# Patient Record
Sex: FEMALE | Race: WHITE
[De-identification: names, ages, dates, MRNs, and addresses within clinical notes are randomized per-mention and may not be internally consistent; named-entity substitution may affect disease eponyms.]

---

## 2019-07-02 ENCOUNTER — HOSPITAL ENCOUNTER (OUTPATIENT)
Dept: HOSPITAL 5 - TRG | Age: 39
Discharge: HOME | End: 2019-07-02
Attending: OBSTETRICS & GYNECOLOGY
Payer: COMMERCIAL

## 2019-07-02 VITALS — SYSTOLIC BLOOD PRESSURE: 120 MMHG | DIASTOLIC BLOOD PRESSURE: 76 MMHG

## 2019-07-02 DIAGNOSIS — O47.03: Primary | ICD-10-CM

## 2019-07-02 DIAGNOSIS — Z3A.37: ICD-10-CM

## 2019-07-04 ENCOUNTER — HOSPITAL ENCOUNTER (INPATIENT)
Dept: HOSPITAL 5 - TRG | Age: 39
LOS: 9 days | Discharge: HOME | End: 2019-07-13
Attending: OBSTETRICS & GYNECOLOGY | Admitting: OBSTETRICS & GYNECOLOGY
Payer: COMMERCIAL

## 2019-07-04 DIAGNOSIS — E87.0: ICD-10-CM

## 2019-07-04 DIAGNOSIS — E87.6: ICD-10-CM

## 2019-07-04 DIAGNOSIS — Z23: ICD-10-CM

## 2019-07-04 DIAGNOSIS — Z3A.38: ICD-10-CM

## 2019-07-04 DIAGNOSIS — A41.9: ICD-10-CM

## 2019-07-04 DIAGNOSIS — J96.02: ICD-10-CM

## 2019-07-04 DIAGNOSIS — Z67.41: ICD-10-CM

## 2019-07-04 DIAGNOSIS — J96.01: ICD-10-CM

## 2019-07-04 DIAGNOSIS — Z72.89: ICD-10-CM

## 2019-07-04 DIAGNOSIS — K21.9: ICD-10-CM

## 2019-07-04 DIAGNOSIS — N17.0: ICD-10-CM

## 2019-07-04 DIAGNOSIS — K56.7: ICD-10-CM

## 2019-07-04 DIAGNOSIS — F41.9: ICD-10-CM

## 2019-07-04 DIAGNOSIS — O26.893: ICD-10-CM

## 2019-07-04 DIAGNOSIS — R65.21: ICD-10-CM

## 2019-07-04 DIAGNOSIS — O99.89: ICD-10-CM

## 2019-07-04 DIAGNOSIS — R00.1: ICD-10-CM

## 2019-07-04 LAB
HCT VFR BLD CALC: 38.9 % (ref 30.3–42.9)
HGB BLD-MCNC: 13.2 GM/DL (ref 10.1–14.3)
MCHC RBC AUTO-ENTMCNC: 34 % (ref 30–34)
MCV RBC AUTO: 93 FL (ref 79–97)
PLATELET # BLD: 215 K/MM3 (ref 140–440)
RBC # BLD AUTO: 4.21 M/MM3 (ref 3.65–5.03)

## 2019-07-04 PROCEDURE — 84450 TRANSFERASE (AST) (SGOT): CPT

## 2019-07-04 PROCEDURE — 94640 AIRWAY INHALATION TREATMENT: CPT

## 2019-07-04 PROCEDURE — 76819 FETAL BIOPHYS PROFIL W/O NST: CPT

## 2019-07-04 PROCEDURE — 82565 ASSAY OF CREATININE: CPT

## 2019-07-04 PROCEDURE — 93970 EXTREMITY STUDY: CPT

## 2019-07-04 PROCEDURE — 84460 ALANINE AMINO (ALT) (SGPT): CPT

## 2019-07-04 PROCEDURE — 76815 OB US LIMITED FETUS(S): CPT

## 2019-07-04 PROCEDURE — 85384 FIBRINOGEN ACTIVITY: CPT

## 2019-07-04 PROCEDURE — 36415 COLL VENOUS BLD VENIPUNCTURE: CPT

## 2019-07-04 PROCEDURE — 85007 BL SMEAR W/DIFF WBC COUNT: CPT

## 2019-07-04 PROCEDURE — 80061 LIPID PANEL: CPT

## 2019-07-04 PROCEDURE — 85018 HEMOGLOBIN: CPT

## 2019-07-04 PROCEDURE — C1765 ADHESION BARRIER: HCPCS

## 2019-07-04 PROCEDURE — 82570 ASSAY OF URINE CREATININE: CPT

## 2019-07-04 PROCEDURE — 85610 PROTHROMBIN TIME: CPT

## 2019-07-04 PROCEDURE — 81001 URINALYSIS AUTO W/SCOPE: CPT

## 2019-07-04 PROCEDURE — 87205 SMEAR GRAM STAIN: CPT

## 2019-07-04 PROCEDURE — 36600 WITHDRAWAL OF ARTERIAL BLOOD: CPT

## 2019-07-04 PROCEDURE — 87086 URINE CULTURE/COLONY COUNT: CPT

## 2019-07-04 PROCEDURE — 85730 THROMBOPLASTIN TIME PARTIAL: CPT

## 2019-07-04 PROCEDURE — 74176 CT ABD & PELVIS W/O CONTRAST: CPT

## 2019-07-04 PROCEDURE — 84550 ASSAY OF BLOOD/URIC ACID: CPT

## 2019-07-04 PROCEDURE — 84300 ASSAY OF URINE SODIUM: CPT

## 2019-07-04 PROCEDURE — 94003 VENT MGMT INPAT SUBQ DAY: CPT

## 2019-07-04 PROCEDURE — 83735 ASSAY OF MAGNESIUM: CPT

## 2019-07-04 PROCEDURE — 84132 ASSAY OF SERUM POTASSIUM: CPT

## 2019-07-04 PROCEDURE — 76770 US EXAM ABDO BACK WALL COMP: CPT

## 2019-07-04 PROCEDURE — 83880 ASSAY OF NATRIURETIC PEPTIDE: CPT

## 2019-07-04 PROCEDURE — 80048 BASIC METABOLIC PNL TOTAL CA: CPT

## 2019-07-04 PROCEDURE — 84100 ASSAY OF PHOSPHORUS: CPT

## 2019-07-04 PROCEDURE — 85461 HEMOGLOBIN FETAL: CPT

## 2019-07-04 PROCEDURE — 93306 TTE W/DOPPLER COMPLETE: CPT

## 2019-07-04 PROCEDURE — C9113 INJ PANTOPRAZOLE SODIUM, VIA: HCPCS

## 2019-07-04 PROCEDURE — G0378 HOSPITAL OBSERVATION PER HR: HCPCS

## 2019-07-04 PROCEDURE — 93005 ELECTROCARDIOGRAM TRACING: CPT

## 2019-07-04 PROCEDURE — 84478 ASSAY OF TRIGLYCERIDES: CPT

## 2019-07-04 PROCEDURE — 86850 RBC ANTIBODY SCREEN: CPT

## 2019-07-04 PROCEDURE — 83930 ASSAY OF BLOOD OSMOLALITY: CPT

## 2019-07-04 PROCEDURE — 94002 VENT MGMT INPAT INIT DAY: CPT

## 2019-07-04 PROCEDURE — 71045 X-RAY EXAM CHEST 1 VIEW: CPT

## 2019-07-04 PROCEDURE — 88307 TISSUE EXAM BY PATHOLOGIST: CPT

## 2019-07-04 PROCEDURE — 82550 ASSAY OF CK (CPK): CPT

## 2019-07-04 PROCEDURE — 84484 ASSAY OF TROPONIN QUANT: CPT

## 2019-07-04 PROCEDURE — 82803 BLOOD GASES ANY COMBINATION: CPT

## 2019-07-04 PROCEDURE — 90715 TDAP VACCINE 7 YRS/> IM: CPT

## 2019-07-04 PROCEDURE — 82553 CREATINE MB FRACTION: CPT

## 2019-07-04 PROCEDURE — 83690 ASSAY OF LIPASE: CPT

## 2019-07-04 PROCEDURE — 94760 N-INVAS EAR/PLS OXIMETRY 1: CPT

## 2019-07-04 PROCEDURE — 74018 RADEX ABDOMEN 1 VIEW: CPT

## 2019-07-04 PROCEDURE — 93010 ELECTROCARDIOGRAM REPORT: CPT

## 2019-07-04 PROCEDURE — 80076 HEPATIC FUNCTION PANEL: CPT

## 2019-07-04 PROCEDURE — 85379 FIBRIN DEGRADATION QUANT: CPT

## 2019-07-04 PROCEDURE — 86901 BLOOD TYPING SEROLOGIC RH(D): CPT

## 2019-07-04 PROCEDURE — 83615 LACTATE (LD) (LDH) ENZYME: CPT

## 2019-07-04 PROCEDURE — 83935 ASSAY OF URINE OSMOLALITY: CPT

## 2019-07-04 PROCEDURE — 85014 HEMATOCRIT: CPT

## 2019-07-04 PROCEDURE — 80053 COMPREHEN METABOLIC PANEL: CPT

## 2019-07-04 PROCEDURE — 86140 C-REACTIVE PROTEIN: CPT

## 2019-07-04 PROCEDURE — 86900 BLOOD TYPING SEROLOGIC ABO: CPT

## 2019-07-04 PROCEDURE — 85025 COMPLETE CBC W/AUTO DIFF WBC: CPT

## 2019-07-04 PROCEDURE — 86592 SYPHILIS TEST NON-TREP QUAL: CPT

## 2019-07-04 PROCEDURE — 82962 GLUCOSE BLOOD TEST: CPT

## 2019-07-04 PROCEDURE — 85027 COMPLETE CBC AUTOMATED: CPT

## 2019-07-04 PROCEDURE — 82140 ASSAY OF AMMONIA: CPT

## 2019-07-04 PROCEDURE — 87040 BLOOD CULTURE FOR BACTERIA: CPT

## 2019-07-04 PROCEDURE — 87070 CULTURE OTHR SPECIMN AEROBIC: CPT

## 2019-07-04 RX ADMIN — SODIUM CHLORIDE, SODIUM LACTATE, POTASSIUM CHLORIDE, AND CALCIUM CHLORIDE SCH MLS/HR: .6; .31; .03; .02 INJECTION, SOLUTION INTRAVENOUS at 22:30

## 2019-07-04 RX ADMIN — AMPICILLIN SODIUM SCH MLS/HR: 1 INJECTION, POWDER, FOR SOLUTION INTRAMUSCULAR; INTRAVENOUS at 23:28

## 2019-07-04 RX ADMIN — FENTANYL CITRATE PRN MCG: 50 INJECTION, SOLUTION INTRAMUSCULAR; INTRAVENOUS at 20:54

## 2019-07-04 RX ADMIN — FENTANYL CITRATE PRN MCG: 50 INJECTION, SOLUTION INTRAMUSCULAR; INTRAVENOUS at 22:26

## 2019-07-04 NOTE — ULTRASOUND REPORT
Limited OB ultrasound and BPP



INDICATION:  labor



FINDINGS: Single intrauterine pregnancy is identified. The fetus is in a cephalic presentation. The a
mniotic fluid index is 5 cm which is below normal. The placenta is located anteriorly. Placenta is gr
aldair 1. Fetal heart rate is 142 bpm.











Biophysical profile score is 8 out of 8.



There is one pocket of fluid measuring 2 cm in the vertical axis even though the ANGUS is decreased.





IMPRESSION: The amniotic fluid index is decreased (5 cm). The BPP score is 8 out of 8.



Signer Name: Ethan Yin MD 

Signed: 2019 7:41 PM

 Workstation Name: [id number]

## 2019-07-04 NOTE — HISTORY AND PHYSICAL REPORT
History of Present Illness


Date of examination: 19


Date of admission: 


19


Chief complaint: 





contractions


History of present illness: 





Pt presents c/o contractions. Cx noted to be 1cm. Pt had variable decel and bpp 

with angus was obtained. ANGUS was 8/8 but ANGUS was 5cm. Pt admitted for delivery for

low ANGUS and latent labor.


EDC Confirmation:  2019


Gestational Age:  7 6/7 weeks





Past Pregnancy History 


   :      1


   Term Births:      0


   Premature Births:   0


   Living Children:   0


   Para:         0


   Mult. Births:      0


   Prev :   0


   Prev.  attempt?   0


   Aborta:      0


   Elect. Ab:      0


   Spont. Ab:      0


   Ectopics:      0








Past Medical History:


   Anxiety


   G [facility]





Past Surgical History:


   Reviewed history from 2016 and no changes required:


      Negative Past Surgical History








Social History:


   Reviewed history from 2016 and no changes required:


      Homemaker


      


      Smoking History:


      Patient has never smoked.








Risk Factors: 





Smoked Tobacco Use:  Never smoker


Smokeless Tobacco Use:  Never


Passive smoke exposure:  no


Drug use:  no


HIV high-risk behavior:  no


Alcohol use:  yes


   Type:  occ


   Drinks per day:  social


Exercise:  no


Seatbelt use:  100 %








Past Medical History 


Abnormal PAP: positive


Uterine Anomaly: negative





Social Hx: Homemaker





Smoking History:


Patient has never smoked.








Infection History 


Hx of STD: none


HIV Risk Eval: no


Personal hx. of genital herpes: yes


Partner hx. of genital herpes: no





Genetic History 


ADVANCED MATERNAL AGE


Congenital Heart Defect:


    Dad: unknown


Canavan Disease:


    Dad: unknown


Thalassemia


    Dad: unknown


Neural Tube Defect


    Dad: unknown


Down's Syndrome


    Dad: unknown


Randolph-Sachs


    Dad: unknown


Sickle Cell Disease/Trait


    Dad: unknown


Hemophilia


    Dad: unknown


Muscular Dystrophy


    Dad: unknown


Cystic Fibrosis


    Dad: unknown


Christiano Chorea


    Dad: unknown


Mental Retardation


    Dad: unknown


Fragile X


    Dad: unknown


Other Genetic/Chromosomal Disorder


    Dad: unknown


Child w/other birth defect


    Dad: unknown





Enviromental Exposures 


Xray Exposure: no


Medication, drug, or alcohol use since LMP: no


Chemical/Other Exposure: no


Exposure to Cat Liter: no


Hx of Parvovirus (Fifth Disease): no


Active Medications (reviewed today):


PRENATAL 19 ORAL TABLET CHEWABLE (PRENATAL VIT-FE FUMARATE-FA) 1 po q day as 

directed


VALTREX 500 MG ORAL TABLET (VALACYCLOVIR HCL) one by mouth twice a day for 3 

days


LEXAPRO 5 MG () 


VALTREX 500 MG ORAL TABLET (VALACYCLOVIR HCL) one by mouth twice a day for 3 

days





Current Allergies (reviewed today):


No known allergies





Past History





- Obstetrical History


Expected Date of Delivery: 19


Actual Gestation: 38 Week(s) 2 Day(s) 


: 1





Medications and Allergies


                                    Allergies











Allergy/AdvReac Type Severity Reaction Status Date / Time


 


No Known Allergies Allergy   Verified 19 19:31














Review of Systems


All systems: negative





- Vital Signs


Vital signs: 


                                   Vital Signs











Pulse BP


 


 85   177/85 


 


 19 18:02  19 18:02








                                        











Temp Pulse Resp BP Pulse Ox


 


 98.1 F   96 H  14   154/89    


 


 19 18:24  19 19:07  19 18:24  19 19:07   














- Physical Exam


Lungs: Positive: Clear to auscultation, Normal air movement


Abdomen: Positive: normal appearance, soft.  Negative: distention, tenderness, 

guarding


Genitourinary (Female): Positive: normal external genitalia, normal perenium.  

Negative: perineal/vulvar lesions


Vagina: Positive: other (no s/sx of SROM as per triage nurse. CX exam as per 

triage nurse)





Results


Result Diagrams: 


                                 19 19:45





All other labs normal.








Assessment and Plan





- Patient Problems


(1) 38 weeks gestation of pregnancy


Current Visit: Yes   Status: Acute   





(2) Prolonged latent phase of labor


Current Visit: Yes   Status: Acute   





(3) Oligohydramnios antepartum


Current Visit: Yes   Status: Acute   


Plan to address problem: 


-admit


-pitocin as needed currently having regular contractions








(4) Positive GBS test


Current Visit: Yes   Status: Acute   


Plan to address problem: 


-start antibx until delivery

## 2019-07-05 LAB
ALT SERPL-CCNC: 24 UNITS/L (ref 7–56)
BILIRUB UR QL STRIP: (no result)
BLOOD UR QL VISUAL: (no result)
HCT VFR BLD CALC: 39.4 % (ref 30.3–42.9)
HGB BLD-MCNC: 13.5 GM/DL (ref 10.1–14.3)
MCHC RBC AUTO-ENTMCNC: 34 % (ref 30–34)
MCV RBC AUTO: 91 FL (ref 79–97)
PH UR STRIP: 6 [PH] (ref 5–7)
PLATELET # BLD: 195 K/MM3 (ref 140–440)
PROT UR STRIP-MCNC: (no result) MG/DL
RBC # BLD AUTO: 4.31 M/MM3 (ref 3.65–5.03)
RBC #/AREA URNS HPF: 2 /HPF (ref 0–6)
URATE SERPL-MCNC: 4.7 MG/DL (ref 3.5–7.6)
UROBILINOGEN UR-MCNC: < 2 MG/DL (ref ?–2)
WBC #/AREA URNS HPF: < 1 /HPF (ref 0–6)

## 2019-07-05 PROCEDURE — 10H07YZ INSERTION OF OTHER DEVICE INTO PRODUCTS OF CONCEPTION, VIA NATURAL OR ARTIFICIAL OPENING: ICD-10-PCS | Performed by: OBSTETRICS & GYNECOLOGY

## 2019-07-05 RX ADMIN — FENTANYL CITRATE PRN MCG: 50 INJECTION, SOLUTION INTRAMUSCULAR; INTRAVENOUS at 01:40

## 2019-07-05 RX ADMIN — SODIUM CHLORIDE, SODIUM LACTATE, POTASSIUM CHLORIDE, AND CALCIUM CHLORIDE SCH MLS/HR: .6; .31; .03; .02 INJECTION, SOLUTION INTRAVENOUS at 13:55

## 2019-07-05 RX ADMIN — FENTANYL CITRATE PRN MCG: 50 INJECTION, SOLUTION INTRAMUSCULAR; INTRAVENOUS at 11:38

## 2019-07-05 RX ADMIN — AMPICILLIN SODIUM SCH MLS/HR: 1 INJECTION, POWDER, FOR SOLUTION INTRAMUSCULAR; INTRAVENOUS at 09:39

## 2019-07-05 RX ADMIN — AMPICILLIN SODIUM SCH MLS/HR: 1 INJECTION, POWDER, FOR SOLUTION INTRAMUSCULAR; INTRAVENOUS at 04:00

## 2019-07-05 RX ADMIN — FENTANYL CITRATE PRN MCG: 50 INJECTION, SOLUTION INTRAMUSCULAR; INTRAVENOUS at 14:28

## 2019-07-05 RX ADMIN — FENTANYL CITRATE PRN MCG: 50 INJECTION, SOLUTION INTRAMUSCULAR; INTRAVENOUS at 19:10

## 2019-07-05 RX ADMIN — AMPICILLIN SODIUM SCH MLS/HR: 1 INJECTION, POWDER, FOR SOLUTION INTRAMUSCULAR; INTRAVENOUS at 14:59

## 2019-07-05 RX ADMIN — AMPICILLIN SODIUM SCH MLS/HR: 1 INJECTION, POWDER, FOR SOLUTION INTRAMUSCULAR; INTRAVENOUS at 18:31

## 2019-07-05 NOTE — PROGRESS NOTE
Assessment and Plan


SVE 5,100,-1 Bolus for epidural Re-eval after placement








Subjective





- Subjective


Date of service: 07/05/19 (pt asking for epidural)


Principal diagnosis: 1) 38 wks  2)Oligo


Patient reports: fetal movement normal, no new complaints





Objective





- Vital Signs


Vital Signs: 


                               Vital Signs - 12hr











  07/05/19 07/05/19 07/05/19





  01:58 02:00 02:30


 


Temperature  97.6 F 97.8 F


 


Pulse Rate 92 H  


 


Respiratory  20 20





Rate   


 


Blood Pressure 136/75  














  07/05/19 07/05/19 07/05/19





  03:24 04:00 08:18


 


Temperature  98.0 F 


 


Pulse Rate 101 H  107 H


 


Respiratory  20 





Rate   


 


Blood Pressure 162/89  122/69














  07/05/19 07/05/19 07/05/19





  10:12 10:41 11:11


 


Temperature   


 


Pulse Rate 104 H 100 H 107 H


 


Respiratory   





Rate   


 


Blood Pressure 131/79 139/70 144/82














  07/05/19 07/05/19 07/05/19





  11:41 11:48 11:53


 


Temperature   


 


Pulse Rate 93 H 90 90


 


Respiratory   





Rate   


 


Blood Pressure 138/77 127/69 142/69














  07/05/19 07/05/19 07/05/19





  11:58 12:01 12:07


 


Temperature   


 


Pulse Rate 90 90 90


 


Respiratory   





Rate   


 


Blood Pressure 139/69 139/72 139/69














  07/05/19 07/05/19 07/05/19





  12:11 12:16 12:36


 


Temperature   


 


Pulse Rate 94 H 87 94 H


 


Respiratory   





Rate   


 


Blood Pressure 126/64 129/65 143/76














  07/05/19 07/05/19 07/05/19





  12:51 13:06 13:22


 


Temperature   


 


Pulse Rate 105 H 109 H 103 H


 


Respiratory   





Rate   


 


Blood Pressure 137/70 135/77 155/84














- Exam


Breasts: deferred


Cardiovascular: Regular rate


Lungs: Normal air movement, Other


Abdomen: Present: normal appearance, soft.  Absent: distention, tenderness


Uterus: Present: normal


FHR: auscultation normal, category 1


Uterine Contraction Monitor Mode: Internal


Cervical Dilatation: 5 (ISE/IUP)


Cervical Effacement Percentage: 100


Fetal station: -1


Uterine Contraction Pattern: Regular


Uterine Tone Measurement Phase: Resting


Uterine Contraction Intensity: Moderate


Extremities: normal


Deep Tendon Reflex Grade: Normal +2





- Labs


Labs: 


                                  Abnormal Labs











  07/04/19





  19:45


 


WBC  12.3 H


 


RDW  16.9 H








                         Laboratory Results - last 24 hr











  07/04/19 07/04/19 07/04/19





  19:45 19:45 21:00


 


WBC  12.3 H  


 


RBC  4.21  


 


Hgb  13.2  


 


Hct  38.9  


 


MCV  93  


 


MCH  31  


 


MCHC  34  


 


RDW  16.9 H  


 


Plt Count  215  


 


RPR   Nonreactive 


 


Blood Type    O NEGATIVE


 


Antibody Screen    Negative

## 2019-07-05 NOTE — EVENT NOTE
Date: 07/05/19





Assessment and Plan





- Patient Problems


(1) 38 weeks gestation of pregnancy


Current Visit: Yes   Status: Acute   





(2) Pulmonary edema


Current Visit: Yes   Status: Acute   





(3) Positive GBS test


Current Visit: Yes   Status: Acute   





(4) Gestational hypertension


Current Visit: Yes   Status: Acute   


Qualifiers: 


   Trimester: third trimester   Qualified Code(s): O13.3 - Gestational 

[pregnancy-induced] hypertension without significant proteinuria, third 

trimester   


Plan to address problem: 


with severe features, UA resulted, negative protein. Still with hold MgSO4 d/t 

pulm edema

## 2019-07-05 NOTE — PROGRESS NOTE
Assessment and Plan


 anesthesia presented to pt's room to place epidural and pt was c/o being 

SOB, gasping. O2 via face mask, O2 sat 70%. Stat x-ray ordered, BP elevated PIH 

labs ordered. SVE unchged. Notified  of situation Order given for 

Lasix 10mg X 1 dose.








Subjective





- Subjective


Date of service: 07/05/19 (called urgently to room)


Principal diagnosis: 1) 38 wks  2)Oligo


Patient reports: fetal movement normal, other (labored breathing when anesthesia

presented to the room), no new complaints





Objective





- Vital Signs


Vital Signs: 


                               Vital Signs - 12hr











  07/05/19 07/05/19 07/05/19





  08:18 10:12 10:41


 


Pulse Rate 107 H 104 H 100 H


 


Blood Pressure 122/69 131/79 139/70


 


O2 Sat by Pulse   





Oximetry   














  07/05/19 07/05/19 07/05/19





  11:11 11:41 11:48


 


Pulse Rate 107 H 93 H 90


 


Blood Pressure 144/82 138/77 127/69


 


O2 Sat by Pulse   





Oximetry   














  07/05/19 07/05/19 07/05/19





  11:53 11:58 12:01


 


Pulse Rate 90 90 90


 


Blood Pressure 142/69 139/69 139/72


 


O2 Sat by Pulse   





Oximetry   














  07/05/19 07/05/19 07/05/19





  12:07 12:11 12:16


 


Pulse Rate 90 94 H 87


 


Blood Pressure 139/69 126/64 129/65


 


O2 Sat by Pulse   





Oximetry   














  07/05/19 07/05/19 07/05/19





  12:36 12:51 13:06


 


Pulse Rate 94 H 105 H 109 H


 


Blood Pressure 143/76 137/70 135/77


 


O2 Sat by Pulse   





Oximetry   














  07/05/19 07/05/19 07/05/19





  13:22 14:13 14:18


 


Pulse Rate 103 H 110 H 108 H


 


Blood Pressure 155/84  


 


O2 Sat by Pulse  93 91





Oximetry   














  07/05/19 07/05/19 07/05/19





  14:20 14:22 14:23


 


Pulse Rate 116 H 113 H 114 H


 


Blood Pressure  169/93 


 


O2 Sat by Pulse 94  92





Oximetry   














  07/05/19 07/05/19 07/05/19





  14:28 14:33 14:36


 


Pulse Rate 117 H 118 H 109 H


 


Blood Pressure   139/62


 


O2 Sat by Pulse 93 76 L 





Oximetry   














  07/05/19 07/05/19 07/05/19





  14:38 14:43 14:48


 


Pulse Rate 114 H 113 H 118 H


 


Blood Pressure   


 


O2 Sat by Pulse 88 87 89





Oximetry   














  07/05/19 07/05/19 07/05/19





  14:53 14:58 15:03


 


Pulse Rate 113 H 121 H 122 H


 


Blood Pressure   


 


O2 Sat by Pulse 86 73 L 76 L





Oximetry   














  07/05/19 07/05/19 07/05/19





  15:07 15:08 15:13


 


Pulse Rate 116 H 121 H 124 H


 


Blood Pressure 172/111  


 


O2 Sat by Pulse  76 L 79 L





Oximetry   














  07/05/19 07/05/19 07/05/19





  15:39 15:40 15:42


 


Pulse Rate  136 H 125 H


 


Blood Pressure   221/96


 


O2 Sat by Pulse 72 L 70 L 





Oximetry   














  07/05/19 07/05/19 07/05/19





  15:45 15:48 15:50


 


Pulse Rate 132 H 133 H 131 H


 


Blood Pressure 265/148 137/90 


 


O2 Sat by Pulse 88  90





Oximetry   














  07/05/19 07/05/19 07/05/19





  15:52 15:55 16:00


 


Pulse Rate 139 H 131 H 141 H


 


Blood Pressure 143/81  


 


O2 Sat by Pulse  94 86





Oximetry   














  07/05/19 07/05/19 07/05/19





  16:03 16:05 16:10


 


Pulse Rate 138 H 138 H 137 H


 


Blood Pressure   


 


O2 Sat by Pulse 90 91 90





Oximetry   














  07/05/19 07/05/19 07/05/19





  16:11 16:15 16:20


 


Pulse Rate 45 L 140 H 


 


Blood Pressure   


 


O2 Sat by Pulse 94 93 92





Oximetry   














  07/05/19 07/05/19 07/05/19





  16:21 16:23 16:25


 


Pulse Rate 144 H 141 H 130 H


 


Blood Pressure  185/99 186/90


 


O2 Sat by Pulse 93  





Oximetry   














  07/05/19 07/05/19





  16:26 16:27


 


Pulse Rate 134 H 136 H


 


Blood Pressure  160/66


 


O2 Sat by Pulse 92 





Oximetry  














- Exam


Breasts: deferred


Cardiovascular: Other (tachycardia >100)


Lungs: Other (wheezing and rales)


Abdomen: Present: normal appearance, soft.  Absent: distention, tenderness


Uterus: Present: normal


FHR: auscultation normal, category 1


Uterine Contraction Monitor Mode: External


Cervical Dilatation: 4


Cervical Effacement Percentage: 100


Fetal station: -1


Uterine Contraction Pattern: Regular


Uterine Tone Measurement Phase: Resting


Uterine Contraction Intensity: Moderate


Extremities: edema


Deep Tendon Reflex Grade: Normal +2





- Labs


Labs: 


                                  Abnormal Labs











  07/04/19 07/05/19





  19:45 16:12


 


WBC  12.3 H  16.3 H


 


RDW  16.9 H  16.6 H








                         Laboratory Results - last 24 hr











  07/04/19 07/04/19 07/04/19





  19:45 19:45 21:00


 


WBC  12.3 H  


 


RBC  4.21  


 


Hgb  13.2  


 


Hct  38.9  


 


MCV  93  


 


MCH  31  


 


MCHC  34  


 


RDW  16.9 H  


 


Plt Count  215  


 


RPR   Nonreactive 


 


Blood Type    O NEGATIVE


 


Antibody Screen    Negative














  07/05/19





  16:12


 


WBC  16.3 H


 


RBC  4.31


 


Hgb  13.5


 


Hct  39.4


 


MCV  91


 


MCH  31


 


MCHC  34


 


RDW  16.6 H


 


Plt Count  195


 


RPR 


 


Blood Type 


 


Antibody Screen

## 2019-07-05 NOTE — PROGRESS NOTE
Assessment and Plan





- Patient Problems


(1) 38 weeks gestation of pregnancy


Current Visit: Yes   Status: Acute   


Plan to address problem: 


No cervical change since ~1330, pitocin only increased to 4mu/min with variables

noted at 4mu/min. Pitocin decreaseed to 2mu/min however discontinued when she 

was diagnosed with Pulm edema. Options reviewed. She desires to proceed with c/s

however will attempt to optimize breathing prior to c/s. Cat 1 FHT's








(2) Pulmonary edema


Current Visit: Yes   Status: Acute   


Plan to address problem: 


Feeling better, s/p lasix 10mg IV x2








(3) Preeclampsia


Current Visit: Yes   Status: Acute   


Plan to address problem: 


Diagnosis by elevated BP's, will hold MgSO4 d/t pulmonary edema. observe closely








(4) Positive GBS test


Current Visit: Yes   Status: Acute   





Subjective





- Subjective


Date of service: 07/05/19


Principal diagnosis: 1) 38 wks  2)Oligo 3) Preeclampsia 4) pulmonary edema


Interval history: 


Breathing better, resting in bed head elevated. 10L face mask





Patient reports: new complaints, fetal movement normal, other (labored breathing

when anesthesia presented to the room)





Objective





- Vital Signs


Vital Signs: 


                               Vital Signs - 12hr











  07/05/19 07/05/19 07/05/19





  08:18 10:12 10:41


 


Pulse Rate 107 H 104 H 100 H


 


Blood Pressure 122/69 131/79 139/70


 


O2 Sat by Pulse   





Oximetry   














  07/05/19 07/05/19 07/05/19





  11:11 11:41 11:48


 


Pulse Rate 107 H 93 H 90


 


Blood Pressure 144/82 138/77 127/69


 


O2 Sat by Pulse   





Oximetry   














  07/05/19 07/05/19 07/05/19





  11:53 11:58 12:01


 


Pulse Rate 90 90 90


 


Blood Pressure 142/69 139/69 139/72


 


O2 Sat by Pulse   





Oximetry   














  07/05/19 07/05/19 07/05/19





  12:07 12:11 12:16


 


Pulse Rate 90 94 H 87


 


Blood Pressure 139/69 126/64 129/65


 


O2 Sat by Pulse   





Oximetry   














  07/05/19 07/05/19 07/05/19





  12:36 12:51 13:06


 


Pulse Rate 94 H 105 H 109 H


 


Blood Pressure 143/76 137/70 135/77


 


O2 Sat by Pulse   





Oximetry   














  07/05/19 07/05/19 07/05/19





  13:22 14:13 14:18


 


Pulse Rate 103 H 110 H 108 H


 


Blood Pressure 155/84  


 


O2 Sat by Pulse  93 91





Oximetry   














  07/05/19 07/05/19 07/05/19





  14:20 14:22 14:23


 


Pulse Rate 116 H 113 H 114 H


 


Blood Pressure  169/93 


 


O2 Sat by Pulse 94  92





Oximetry   














  07/05/19 07/05/19 07/05/19





  14:28 14:33 14:36


 


Pulse Rate 117 H 118 H 109 H


 


Blood Pressure   139/62


 


O2 Sat by Pulse 93 76 L 





Oximetry   














  07/05/19 07/05/19 07/05/19





  14:38 14:43 14:48


 


Pulse Rate 114 H 113 H 118 H


 


Blood Pressure   


 


O2 Sat by Pulse 88 87 89





Oximetry   














  07/05/19 07/05/19 07/05/19





  14:53 14:58 15:03


 


Pulse Rate 113 H 121 H 122 H


 


Blood Pressure   


 


O2 Sat by Pulse 86 73 L 76 L





Oximetry   














  07/05/19 07/05/19 07/05/19





  15:07 15:08 15:13


 


Pulse Rate 116 H 121 H 124 H


 


Blood Pressure 172/111  


 


O2 Sat by Pulse  76 L 79 L





Oximetry   














  07/05/19 07/05/19 07/05/19





  15:39 15:40 15:42


 


Pulse Rate  136 H 125 H


 


Blood Pressure   221/96


 


O2 Sat by Pulse 72 L 70 L 





Oximetry   














  07/05/19 07/05/19 07/05/19





  15:45 15:48 15:50


 


Pulse Rate 132 H 133 H 131 H


 


Blood Pressure 265/148 137/90 


 


O2 Sat by Pulse 88  90





Oximetry   














  07/05/19 07/05/19 07/05/19





  15:52 15:55 16:00


 


Pulse Rate 139 H 131 H 141 H


 


Blood Pressure 143/81  


 


O2 Sat by Pulse  94 86





Oximetry   














  07/05/19 07/05/19 07/05/19





  16:03 16:05 16:10


 


Pulse Rate 138 H 138 H 137 H


 


Blood Pressure   


 


O2 Sat by Pulse 90 91 90





Oximetry   














  07/05/19 07/05/19 07/05/19





  16:11 16:15 16:20


 


Pulse Rate 45 L 140 H 


 


Blood Pressure   


 


O2 Sat by Pulse 94 93 92





Oximetry   














  07/05/19 07/05/19 07/05/19





  16:21 16:23 16:25


 


Pulse Rate 144 H 141 H 130 H


 


Blood Pressure  185/99 186/90


 


O2 Sat by Pulse 93  





Oximetry   














  07/05/19 07/05/19 07/05/19





  16:26 16:27 16:29


 


Pulse Rate 134 H 136 H 136 H


 


Blood Pressure  160/66 150/66


 


O2 Sat by Pulse 92  





Oximetry   














  07/05/19 07/05/19 07/05/19





  16:31 16:37 16:38


 


Pulse Rate 133 H 60 150 H


 


Blood Pressure 142/63  148/67


 


O2 Sat by Pulse 93 78 L 





Oximetry   














  07/05/19 07/05/19 07/05/19





  16:41 16:42 16:47


 


Pulse Rate 148 H 150 H 64


 


Blood Pressure 121/57  


 


O2 Sat by Pulse  90 89





Oximetry   














  07/05/19 07/05/19 07/05/19





  16:48 16:50 16:52


 


Pulse Rate 65 137 H 153 H


 


Blood Pressure  81/38 76/43


 


O2 Sat by Pulse 88  





Oximetry   














  07/05/19 07/05/19 07/05/19





  16:56 16:57 17:03


 


Pulse Rate 116 H 110 H 


 


Blood Pressure   


 


O2 Sat by Pulse 92 89 100





Oximetry   














  07/05/19 07/05/19 07/05/19





  17:06 17:09 17:12


 


Pulse Rate  121 H 117 H


 


Blood Pressure   115/59


 


O2 Sat by Pulse 92 94 





Oximetry   














  07/05/19 07/05/19 07/05/19





  17:14 17:18 17:19


 


Pulse Rate 107 H 118 H 126 H


 


Blood Pressure  83/43 


 


O2 Sat by Pulse 91  93





Oximetry   














  07/05/19 07/05/19 07/05/19





  17:24 17:28 17:29


 


Pulse Rate 116 H 125 H 121 H


 


Blood Pressure  90/52 


 


O2 Sat by Pulse 91 93 91





Oximetry   














  07/05/19 07/05/19 07/05/19





  17:33 17:34 17:37


 


Pulse Rate 106 H 100 H 95 H


 


Blood Pressure 112/60  115/66


 


O2 Sat by Pulse  92 





Oximetry   














  07/05/19 07/05/19 07/05/19





  17:39 17:42 17:43


 


Pulse Rate 115 H 102 H 114 H


 


Blood Pressure   111/69


 


O2 Sat by Pulse 98 94 





Oximetry   














  07/05/19 07/05/19 07/05/19





  17:44 17:47 17:49


 


Pulse Rate 111 H 116 H 110 H


 


Blood Pressure  111/58 


 


O2 Sat by Pulse 97  96





Oximetry   














  07/05/19 07/05/19 07/05/19





  17:52 17:54 17:57


 


Pulse Rate 104 H 103 H 108 H


 


Blood Pressure 108/55  108/56


 


O2 Sat by Pulse  96 





Oximetry   














  07/05/19 07/05/19 07/05/19





  17:59 18:02 18:04


 


Pulse Rate 110 H 106 H 103 H


 


Blood Pressure  113/64 


 


O2 Sat by Pulse 97  96





Oximetry   














- Exam


Breasts: deferred


Lungs: Other


Vulva: both: normal


Uterus: Present: fundal height above umbilicus.  Absent: tenderness


FHR: category 1


Uterine Contraction Monitor Mode: Internal


Cervical Dilatation: 5


Cervical Effacement Percentage: 100


Fetal station: 0


Uterine Contraction Pattern: Regular (pitocin off)


Extremities: edema (trace)





- Labs


Labs: 


                                  Abnormal Labs











  07/04/19 07/05/19 07/05/19





  19:45 16:12 16:12


 


WBC  12.3 H  16.3 H 


 


RDW  16.9 H  16.6 H 


 


Creatinine    0.5 L


 


Lactate Dehydrogenase    248 H








                         Laboratory Results - last 24 hr











  07/04/19 07/04/19 07/04/19





  19:45 19:45 21:00


 


WBC  12.3 H  


 


RBC  4.21  


 


Hgb  13.2  


 


Hct  38.9  


 


MCV  93  


 


MCH  31  


 


MCHC  34  


 


RDW  16.9 H  


 


Plt Count  215  


 


Creatinine   


 


Estimated GFR   


 


Uric Acid   


 


AST   


 


ALT   


 


Lactate Dehydrogenase   


 


Urine Color   


 


Urine Turbidity   


 


Urine pH   


 


Ur Specific Gravity   


 


Urine Protein   


 


Urine Glucose (UA)   


 


Urine Ketones   


 


Urine Blood   


 


Urine Nitrite   


 


Urine Bilirubin   


 


Urine Urobilinogen   


 


Ur Leukocyte Esterase   


 


Urine WBC (Auto)   


 


Urine RBC (Auto)   


 


U Epithel Cells (Auto)   


 


RPR   Nonreactive 


 


Blood Type    O NEGATIVE


 


Antibody Screen    Negative














  07/05/19 07/05/19 07/05/19





  16:12 16:12 16:26


 


WBC  16.3 H  


 


RBC  4.31  


 


Hgb  13.5  


 


Hct  39.4  


 


MCV  91  


 


MCH  31  


 


MCHC  34  


 


RDW  16.6 H  


 


Plt Count  195  


 


Creatinine   0.5 L 


 


Estimated GFR   > 60 


 


Uric Acid   4.7 


 


AST   33 


 


ALT   24 


 


Lactate Dehydrogenase   248 H 


 


Urine Color    Straw


 


Urine Turbidity    Clear


 


Urine pH    6.0


 


Ur Specific Gravity    1.009


 


Urine Protein    <15 mg/dl


 


Urine Glucose (UA)    >=500


 


Urine Ketones    80


 


Urine Blood    Sm


 


Urine Nitrite    Neg


 


Urine Bilirubin    Neg


 


Urine Urobilinogen    < 2.0


 


Ur Leukocyte Esterase    Neg


 


Urine WBC (Auto)    < 1.0


 


Urine RBC (Auto)    2.0


 


U Epithel Cells (Auto)    < 1.0


 


RPR   


 


Blood Type   


 


Antibody Screen

## 2019-07-05 NOTE — PROGRESS NOTE
Assessment and Plan





- Patient Problems


(1) 38 weeks gestation of pregnancy


Current Visit: Yes   Status: Acute   





(2) Prolonged latent phase of labor


Current Visit: Yes   Status: Acute   





(3) Oligohydramnios antepartum


Current Visit: Yes   Status: Acute   


Plan to address problem: 


-admit


-pitocin as needed currently having regular contractions








(4) Positive GBS test


Current Visit: Yes   Status: Acute   


Plan to address problem: 


-start antibx until delivery








(5) SROM (spontaneous rupture of membranes)


Current Visit: Yes   Status: Acute   


Plan to address problem: 


- will start pitocin at this time as contractions seem to have spaced out.








Subjective





- Subjective


Date of service: 07/05/19


Principal diagnosis: 1) 38 wks  2)Oligo


Interval history: 


Pt doing well but c/o having contractions. Pt examnined and bag not palpated and

perineum with some bloody show. Fluid appeared to be on the glove as well. It is

not clear when SROM happened as pt does not recall no does she recall having 

leaking prior to coming in to hospital just of having spotting/ bloody 

discharge.


Patient reports: no new complaints





Objective





- Vital Signs


Vital Signs: 


                               Vital Signs - 12hr











  07/04/19 07/04/19 07/04/19





  18:02 18:24 19:07


 


Temperature  98.1 F 


 


Pulse Rate 85  96 H


 


Respiratory  14 





Rate   


 


Blood Pressure 177/85  154/89














  07/04/19 07/04/19 07/04/19





  19:30 20:01 20:57


 


Temperature 97.8 F  


 


Pulse Rate  102 H 96 H


 


Respiratory 18  





Rate   


 


Blood Pressure  173/81 124/72














  07/04/19 07/04/19 07/04/19





  21:59 22:00 22:17


 


Temperature  97.6 F 


 


Pulse Rate 99 H  95 H


 


Respiratory  20 





Rate   


 


Blood Pressure 212/115  186/94














  07/04/19 07/04/19 07/04/19





  22:20 22:21 22:31


 


Temperature   


 


Pulse Rate 100 H 100 H 86


 


Respiratory   





Rate   


 


Blood Pressure 182/92 178/93 148/75














  07/04/19 07/04/19 07/04/19





  22:58 23:10 23:59


 


Temperature  98.6 F 


 


Pulse Rate 90  99 H


 


Respiratory  20 





Rate   


 


Blood Pressure 135/81  182/87














  07/05/19 07/05/19 07/05/19





  00:04 00:05 01:37


 


Temperature   


 


Pulse Rate 102 H 102 H 95 H


 


Respiratory   





Rate   


 


Blood Pressure 151/83 165/82 139/73














  07/05/19 07/05/19 07/05/19





  01:58 02:30 03:24


 


Temperature  97.8 F 


 


Pulse Rate 92 H  101 H


 


Respiratory  20 





Rate   


 


Blood Pressure 136/75  162/89














- Exam


FHR: category 1


Cervical Dilatation: 1 (appears to have SROM/ clear fluid; no bag palpated on 

exam)


Cervical Effacement Percentage: 100


Fetal station: -1





- Labs


Labs: 


                                  Abnormal Labs











  07/04/19





  19:45


 


WBC  12.3 H


 


RDW  16.9 H








                         Laboratory Results - last 24 hr











  07/04/19 07/04/19





  19:45 21:00


 


WBC  12.3 H 


 


RBC  4.21 


 


Hgb  13.2 


 


Hct  38.9 


 


MCV  93 


 


MCH  31 


 


MCHC  34 


 


RDW  16.9 H 


 


Plt Count  215 


 


Blood Type   O NEGATIVE


 


Antibody Screen   Negative

## 2019-07-05 NOTE — ANESTHESIA CONSULTATION
Anesthesia Consult and Med Hx


Date of service: 19





- Airway


Anesthetic Teeth Evaluation: Good


ROM Head & Neck: Adequate


Mental/Hyoid Distance: Adequate


Mallampati Class: Class II


Intubation Access Assessment: Probably Good





- Pulmonary Exam


CTA: No (diffuse bilateral rhonchi)





- Cardiac Exam


Cardiac Exam: RRR (tachycardia)





- Pre-Operative Health Status


ASA Pre-Surgery Classification: ASA3


Proposed Anesthetic Plan: Epidural





- Pulmonary


Hx Smoking: No


Hx Asthma: No


Hx Respiratory Symptoms: Yes (sudden onset dyspnea less than 30 mins prior to 

exam)


SOB: Yes


COPD: No


Hx Pneumonia: No





- Cardiovascular System


Hx Hypertension: Yes (BP elevated on admission)


Hx Heart Attack/AMI: No


Hx Percutaneous Transluminal Coronary Angioplasty (PTCA): No


Hx Cardia Arrhythmia: No





- Central Nervous System


Hx Seizures: No


CVA: No


Hx Psychiatric Problems: Yes (anxiety)





- Endocrine


Hx Renal Disease: No


Hx Liver Disease: No


Hx Non-Insulin Dependent Diabetes: Yes


Hx Thyroid Disease: No





- Other Systems


Hx Alcohol Use: Yes (occassional)





- Additional Comments


Anesthesia Medical History Comments: No prior epidurals. No hx bleeding 

disorders or anticoagulant use.  On arrival to patient's room at approx 1545, 

patient complained of acute onset SOB which started less than one half hour 

prior to my arrival. She appeared in moderate distress and lung exam revealed 

diffuse rhonchi. Pulse oxymetry was applied and showed SpO2 mid-70s. She was 

placed on supplemental O2 via NRB with gradual improvement to low-90s. Midwife 

and OB were notified. Stat CXR was ordered revealing pulmonary edema and stat 

PIH labs were drawn. Decision was made to place epidural catheter at this time 

to relieve significant discomfort/anxiety associated with labor pain and for use

should urgent/emergent  become indicated (see anes record for procedure

note). Patient was administerred lasix 10mg IV per OB orders, to which she did n

ot have significant urinary response but did have improvement in work of 

breathing.  Patient was later examined in conjuction with OB and diagnosis of 

pre-eclampsia with severe features was made. SVE unchanged and patient remote 

from delivery. Per OB, will attempt further diuresis with lasix at this time to 

optimize respiratory status prior to likely . Plan of care discussed 

with patient and family memeber at bedside.

## 2019-07-05 NOTE — OPERATIVE REPORT
Operative Report


Operative Report: 





Date:      2019





Preoperative diagnosis:   1.  Intrauterine pregnancy at 38 weeks gestation


         2.  Failure to dilate


         3.  Pulmonary edema


         4.  Gestational diabetes


         5.  Gestational hypertension with severe features


         6.  GBS positive





Postoperative diagnosis:   1.  Intrauterine pregnancy at 38 weeks gestation


         2.  Failure to dilate


         3.  Pulmonary edema


         4.  Gestational diabetes


         5.  Gestational hypertension with severe features


         6.  GBS positive





Procedure:      Low uterine transverse incision for  delivery





Surgeon:                      Nichole Amaya MD





Assistant:      Ambar Bose CST





Anesthesia:      Epidural





Anesthesiologist:   GERMANIA Schmitt M.D.





Estimated blood loss:   700 mL





Urine out:      100 mL





Findings:      Live born female infant.  Weight 6 lbs. 1 oz.  Apgars 8 at 1 

minute and 9 at 5 minutes.  Uterus grossly normal, tubes grossly normal, ovaries

grossly normal. 





Procedure:      After risk, benefits, complications, consequences and 

alternatives for this procedure were discussed with patient and consents were 

reviewed and signed, she was taken to the OR where epidural anesthesia was 

bolused.  She was then placed in the left lateral tilt position, and prepped and

draped in the usual sterile fashion.  Timeout was performed, and an appropriate 

level of anesthesia was noted, a Pfannenstiel incision was made and extended to 

the fascia which was incised and extended in the lateral directions.  The 

overlying fascia was sharply dissected away from the underlying rectus muscles 

in the superior and inferior directions.  The midline was entered bluntly.  The 

vesicouterine fold was incised and with blunt dissection the bladder flap was 

created.  A transverse incision was made in the lower uterine segment and 

extended in superiolateral direction with finger fractionation.  Blood-tinged 

fluid was noted.  The infant was delivered from cephalic OP position.  Mouth and

nose were bulb suctioned.  Spontaneous cry and excellent tone were noted.  Cord 

was doubly clamped and cut.  The infant was given to /resuscitation team

present.  The placenta was manually extracted.  The uterus was then exteriorized

and cleared of any further products of conception or placental tissue.  The 

incision was reapproximated using 0 Vicryl in a running interlocking stitch.  

Grossly normal uterus, tubes and ovaries were noted.  Once hemostasis was noted,

the uterus was allowed back into the pelvic cavity.  The pelvis was irrigated 

with warm normal saline.  Again hemostasis was noted .  Surgicel applied for 

further hemostasis.  Interceed was then placed to prevent adhesions.  Then 

attention was turned to the rectus muscles.  The rectus muscles reapproximated 

using 0 Vicryl in a simple interrupted stitch x 3. Once hemostasis was noted,  

the fascia was reapproximated using 0 Vicryl  running stitch fashion.  Once 

hemostasis was noted skin incision was reapproximated using 4-0 Vicryl on a 

Zack needle in a subcuticular manner.





Counts were correct 3.  Patient tolerated procedure well state recovery room in

stable condition.

## 2019-07-05 NOTE — EVENT NOTE
Date: 07/05/19





Due to labile BP's and pulmonary edema patient will be admitted to IMCU, Dr. Mast hospitalist consulted

## 2019-07-05 NOTE — XRAY REPORT
CHEST 1 VIEW 7/5/2019 3:53 PM



INDICATION / CLINICAL INFORMATION:

Respiratory distress.



COMPARISON: 

None available.



FINDINGS:



SUPPORT DEVICES: None.



HEART / MEDIASTINUM: Mild cardiomegaly. 



LUNGS / PLEURA: Mild bilateral interstitial edema. No significant pleural effusion. No pneumothorax. 




ADDITIONAL FINDINGS: No significant additional findings.



IMPRESSION:

1. Mild cardiomegaly and bilateral interstitial pulmonary edema.



Signer Name: Rufino Diallo MD 

Signed: 7/5/2019 4:33 PM

 Workstation Name: [id number]

## 2019-07-05 NOTE — PROGRESS NOTE
Assessment and Plan





- Patient Problems


(1) 38 weeks gestation of pregnancy


Current Visit: Yes   Status: Acute   





(2) Pulmonary edema


Current Visit: Yes   Status: Acute   





(3) Positive GBS test


Current Visit: Yes   Status: Acute   





(4) Gestational hypertension


Current Visit: Yes   Status: Acute   


Qualifiers: 


   Trimester: third trimester   Qualified Code(s): O13.3 - Gestational 

[pregnancy-induced] hypertension without significant proteinuria, third 

trimester   





(5) Failure of cervical dilation


Current Visit: Yes   Status: Acute   





Subjective





- Subjective


Date of service: 07/05/19


Principal diagnosis: 1) 38 wks  2)Oligo 3) Preeclampsia 4) pulmonary edema


Interval history: 


Feels better, complains of contractions, resting in bed head elevated. Declined 

pelvic exam, desires to proceed with c/s. Consents reviewed and signed, 

questions encouraged and answered, she voiced understanding. 





Patient reports: new complaints, fetal movement normal, other (labored breathing

when anesthesia presented to the room)





Objective





- Vital Signs


Vital Signs: 


                               Vital Signs - 12hr











  07/05/19 07/05/19 07/05/19





  08:18 10:12 10:41


 


Temperature   


 


Pulse Rate 107 H 104 H 100 H


 


Respiratory   





Rate   


 


Blood Pressure 122/69 131/79 139/70


 


O2 Sat by Pulse   





Oximetry   














  07/05/19 07/05/19 07/05/19





  11:11 11:41 11:48


 


Temperature   


 


Pulse Rate 107 H 93 H 90


 


Respiratory   





Rate   


 


Blood Pressure 144/82 138/77 127/69


 


O2 Sat by Pulse   





Oximetry   














  07/05/19 07/05/19 07/05/19





  11:53 11:58 12:01


 


Temperature   


 


Pulse Rate 90 90 90


 


Respiratory   





Rate   


 


Blood Pressure 142/69 139/69 139/72


 


O2 Sat by Pulse   





Oximetry   














  07/05/19 07/05/19 07/05/19





  12:07 12:11 12:16


 


Temperature   


 


Pulse Rate 90 94 H 87


 


Respiratory   





Rate   


 


Blood Pressure 139/69 126/64 129/65


 


O2 Sat by Pulse   





Oximetry   














  07/05/19 07/05/19 07/05/19





  12:36 12:51 13:06


 


Temperature   


 


Pulse Rate 94 H 105 H 109 H


 


Respiratory   





Rate   


 


Blood Pressure 143/76 137/70 135/77


 


O2 Sat by Pulse   





Oximetry   














  07/05/19 07/05/19 07/05/19





  13:22 14:13 14:18


 


Temperature   


 


Pulse Rate 103 H 110 H 108 H


 


Respiratory   





Rate   


 


Blood Pressure 155/84  


 


O2 Sat by Pulse  93 91





Oximetry   














  07/05/19 07/05/19 07/05/19





  14:20 14:22 14:23


 


Temperature   


 


Pulse Rate 116 H 113 H 114 H


 


Respiratory   





Rate   


 


Blood Pressure  169/93 


 


O2 Sat by Pulse 94  92





Oximetry   














  07/05/19 07/05/19 07/05/19





  14:28 14:33 14:36


 


Temperature   


 


Pulse Rate 117 H 118 H 109 H


 


Respiratory   





Rate   


 


Blood Pressure   139/62


 


O2 Sat by Pulse 93 76 L 





Oximetry   














  07/05/19 07/05/19 07/05/19





  14:38 14:43 14:48


 


Temperature   


 


Pulse Rate 114 H 113 H 118 H


 


Respiratory   





Rate   


 


Blood Pressure   


 


O2 Sat by Pulse 88 87 89





Oximetry   














  07/05/19 07/05/19 07/05/19





  14:53 14:58 15:03


 


Temperature   


 


Pulse Rate 113 H 121 H 122 H


 


Respiratory   





Rate   


 


Blood Pressure   


 


O2 Sat by Pulse 86 73 L 76 L





Oximetry   














  07/05/19 07/05/19 07/05/19





  15:07 15:08 15:13


 


Temperature   


 


Pulse Rate 116 H 121 H 124 H


 


Respiratory   





Rate   


 


Blood Pressure 172/111  


 


O2 Sat by Pulse  76 L 79 L





Oximetry   














  07/05/19 07/05/19 07/05/19





  15:39 15:40 15:42


 


Temperature   


 


Pulse Rate  136 H 125 H


 


Respiratory   





Rate   


 


Blood Pressure   221/96


 


O2 Sat by Pulse 72 L 70 L 





Oximetry   














  07/05/19 07/05/19 07/05/19





  15:45 15:48 15:50


 


Temperature   


 


Pulse Rate 132 H 133 H 131 H


 


Respiratory   





Rate   


 


Blood Pressure 265/148 137/90 


 


O2 Sat by Pulse 88  90





Oximetry   














  07/05/19 07/05/19 07/05/19





  15:52 15:55 16:00


 


Temperature   


 


Pulse Rate 139 H 131 H 141 H


 


Respiratory   





Rate   


 


Blood Pressure 143/81  


 


O2 Sat by Pulse  94 86





Oximetry   














  07/05/19 07/05/19 07/05/19





  16:03 16:05 16:10


 


Temperature   


 


Pulse Rate 138 H 138 H 137 H


 


Respiratory   





Rate   


 


Blood Pressure   


 


O2 Sat by Pulse 90 91 90





Oximetry   














  07/05/19 07/05/19 07/05/19





  16:11 16:15 16:20


 


Temperature   


 


Pulse Rate 45 L 140 H 


 


Respiratory   





Rate   


 


Blood Pressure   


 


O2 Sat by Pulse 94 93 92





Oximetry   














  07/05/19 07/05/19 07/05/19





  16:21 16:23 16:25


 


Temperature   


 


Pulse Rate 144 H 141 H 130 H


 


Respiratory   





Rate   


 


Blood Pressure  185/99 186/90


 


O2 Sat by Pulse 93  





Oximetry   














  07/05/19 07/05/19 07/05/19





  16:26 16:27 16:29


 


Temperature   


 


Pulse Rate 134 H 136 H 136 H


 


Respiratory   





Rate   


 


Blood Pressure  160/66 150/66


 


O2 Sat by Pulse 92  





Oximetry   














  07/05/19 07/05/19 07/05/19





  16:31 16:37 16:38


 


Temperature   


 


Pulse Rate 133 H 60 150 H


 


Respiratory   





Rate   


 


Blood Pressure 142/63  148/67


 


O2 Sat by Pulse 93 78 L 





Oximetry   














  07/05/19 07/05/19 07/05/19





  16:41 16:42 16:47


 


Temperature   


 


Pulse Rate 148 H 150 H 64


 


Respiratory   





Rate   


 


Blood Pressure 121/57  


 


O2 Sat by Pulse  90 89





Oximetry   














  07/05/19 07/05/19 07/05/19





  16:48 16:50 16:52


 


Temperature   


 


Pulse Rate 65 137 H 153 H


 


Respiratory   





Rate   


 


Blood Pressure  81/38 76/43


 


O2 Sat by Pulse 88  





Oximetry   














  07/05/19 07/05/19 07/05/19





  16:56 16:57 17:03


 


Temperature   


 


Pulse Rate 116 H 110 H 


 


Respiratory   





Rate   


 


Blood Pressure   


 


O2 Sat by Pulse 92 89 100





Oximetry   














  07/05/19 07/05/19 07/05/19





  17:06 17:09 17:12


 


Temperature   


 


Pulse Rate  121 H 117 H


 


Respiratory   





Rate   


 


Blood Pressure   115/59


 


O2 Sat by Pulse 92 94 





Oximetry   














  07/05/19 07/05/19 07/05/19





  17:14 17:18 17:19


 


Temperature   


 


Pulse Rate 107 H 118 H 126 H


 


Respiratory   





Rate   


 


Blood Pressure  83/43 


 


O2 Sat by Pulse 91  93





Oximetry   














  07/05/19 07/05/19 07/05/19





  17:24 17:28 17:29


 


Temperature   


 


Pulse Rate 116 H 125 H 121 H


 


Respiratory   





Rate   


 


Blood Pressure  90/52 


 


O2 Sat by Pulse 91 93 91





Oximetry   














  07/05/19 07/05/19 07/05/19





  17:33 17:34 17:37


 


Temperature   


 


Pulse Rate 106 H 100 H 95 H


 


Respiratory   





Rate   


 


Blood Pressure 112/60  115/66


 


O2 Sat by Pulse  92 





Oximetry   














  07/05/19 07/05/19 07/05/19





  17:39 17:42 17:43


 


Temperature   


 


Pulse Rate 115 H 102 H 114 H


 


Respiratory   





Rate   


 


Blood Pressure   111/69


 


O2 Sat by Pulse 98 94 





Oximetry   














  07/05/19 07/05/19 07/05/19





  17:44 17:47 17:49


 


Temperature   


 


Pulse Rate 111 H 116 H 110 H


 


Respiratory   





Rate   


 


Blood Pressure  111/58 


 


O2 Sat by Pulse 97  96





Oximetry   














  07/05/19 07/05/19 07/05/19





  17:52 17:54 17:57


 


Temperature   


 


Pulse Rate 104 H 103 H 108 H


 


Respiratory   





Rate   


 


Blood Pressure 108/55  108/56


 


O2 Sat by Pulse  96 





Oximetry   














  07/05/19 07/05/19 07/05/19





  17:59 18:02 18:04


 


Temperature   


 


Pulse Rate 110 H 106 H 103 H


 


Respiratory   





Rate   


 


Blood Pressure  113/64 


 


O2 Sat by Pulse 97  96





Oximetry   














  07/05/19 07/05/19 07/05/19





  18:07 18:09 18:13


 


Temperature   


 


Pulse Rate 105 H 112 H 106 H


 


Respiratory   





Rate   


 


Blood Pressure 122/72  145/82


 


O2 Sat by Pulse  96 





Oximetry   














  07/05/19 07/05/19 07/05/19





  18:14 18:18 18:19


 


Temperature   


 


Pulse Rate 106 H 104 H 107 H


 


Respiratory   





Rate   


 


Blood Pressure  149/79 


 


O2 Sat by Pulse 97  96





Oximetry   














  07/05/19 07/05/19 07/05/19





  18:23 18:24 18:29


 


Temperature   


 


Pulse Rate 108 H 109 H 105 H


 


Respiratory   





Rate   


 


Blood Pressure 148/74  171/81


 


O2 Sat by Pulse  96 97





Oximetry   














  07/05/19 07/05/19 07/05/19





  18:33 18:34 18:39


 


Temperature   


 


Pulse Rate 106 H 108 H 106 H


 


Respiratory   





Rate   


 


Blood Pressure 173/87  176/92


 


O2 Sat by Pulse  97 95





Oximetry   














  07/05/19 07/05/19 07/05/19





  18:40 18:42 18:44


 


Temperature   


 


Pulse Rate  112 H 112 H


 


Respiratory   





Rate   


 


Blood Pressure  165/81 


 


O2 Sat by Pulse 74 L  95





Oximetry   














  07/05/19 07/05/19 07/05/19





  18:48 18:49 18:54


 


Temperature   


 


Pulse Rate 108 H 109 H 112 H


 


Respiratory   





Rate   


 


Blood Pressure 169/79  


 


O2 Sat by Pulse  95 96





Oximetry   














  07/05/19 07/05/19 07/05/19





  18:59 19:00 19:04


 


Temperature   


 


Pulse Rate 110 H 114 H 114 H


 


Respiratory   





Rate   


 


Blood Pressure   


 


O2 Sat by Pulse 96 94 94





Oximetry   














  07/05/19 07/05/19 07/05/19





  19:07 19:08 19:09


 


Temperature   


 


Pulse Rate 112 H 113 H 113 H


 


Respiratory   





Rate   


 


Blood Pressure 193/98 193/98 


 


O2 Sat by Pulse 94  95





Oximetry   














  07/05/19 07/05/19 07/05/19





  19:13 19:14 19:19


 


Temperature   


 


Pulse Rate 104 H 117 H 111 H


 


Respiratory   





Rate   


 


Blood Pressure   


 


O2 Sat by Pulse 90 96 94





Oximetry   














  07/05/19 07/05/19 07/05/19





  19:21 19:24 19:25


 


Temperature   97.9 F


 


Pulse Rate 114 H 116 H 


 


Respiratory   18





Rate   


 


Blood Pressure 194/87  


 


O2 Sat by Pulse  93 





Oximetry   














  07/05/19 07/05/19 07/05/19





  19:29 19:32 19:34


 


Temperature   


 


Pulse Rate 116 H 123 H 116 H


 


Respiratory   





Rate   


 


Blood Pressure  144/80 


 


O2 Sat by Pulse 95  93





Oximetry   














  07/05/19 07/05/19 07/05/19





  19:39 19:41 19:44


 


Temperature   


 


Pulse Rate 124 H 125 H 128 H


 


Respiratory   





Rate   


 


Blood Pressure   


 


O2 Sat by Pulse 93 94 92





Oximetry   














- Labs


Labs: 


                                  Abnormal Labs











  07/04/19 07/05/19 07/05/19





  19:45 16:12 16:12


 


WBC  12.3 H  16.3 H 


 


RDW  16.9 H  16.6 H 


 


Creatinine    0.5 L


 


Lactate Dehydrogenase    248 H








                         Laboratory Results - last 24 hr











  07/04/19 07/04/19 07/04/19





  19:45 19:45 21:00


 


WBC  12.3 H  


 


RBC  4.21  


 


Hgb  13.2  


 


Hct  38.9  


 


MCV  93  


 


MCH  31  


 


MCHC  34  


 


RDW  16.9 H  


 


Plt Count  215  


 


Creatinine   


 


Estimated GFR   


 


Uric Acid   


 


AST   


 


ALT   


 


Lactate Dehydrogenase   


 


Urine Color   


 


Urine Turbidity   


 


Urine pH   


 


Ur Specific Gravity   


 


Urine Protein   


 


Urine Glucose (UA)   


 


Urine Ketones   


 


Urine Blood   


 


Urine Nitrite   


 


Urine Bilirubin   


 


Urine Urobilinogen   


 


Ur Leukocyte Esterase   


 


Urine WBC (Auto)   


 


Urine RBC (Auto)   


 


U Epithel Cells (Auto)   


 


RPR   Nonreactive 


 


Blood Type    O NEGATIVE


 


Antibody Screen    Negative














  07/05/19 07/05/19 07/05/19





  16:12 16:12 16:26


 


WBC  16.3 H  


 


RBC  4.31  


 


Hgb  13.5  


 


Hct  39.4  


 


MCV  91  


 


MCH  31  


 


MCHC  34  


 


RDW  16.6 H  


 


Plt Count  195  


 


Creatinine   0.5 L 


 


Estimated GFR   > 60 


 


Uric Acid   4.7 


 


AST   33 


 


ALT   24 


 


Lactate Dehydrogenase   248 H 


 


Urine Color    Straw


 


Urine Turbidity    Clear


 


Urine pH    6.0


 


Ur Specific Gravity    1.009


 


Urine Protein    <15 mg/dl


 


Urine Glucose (UA)    >=500


 


Urine Ketones    80


 


Urine Blood    Sm


 


Urine Nitrite    Neg


 


Urine Bilirubin    Neg


 


Urine Urobilinogen    < 2.0


 


Ur Leukocyte Esterase    Neg


 


Urine WBC (Auto)    < 1.0


 


Urine RBC (Auto)    2.0


 


U Epithel Cells (Auto)    < 1.0


 


RPR   


 


Blood Type   


 


Antibody Screen

## 2019-07-06 LAB
BASOPHILS # (AUTO): 0 K/MM3 (ref 0–0.1)
BASOPHILS NFR BLD AUTO: 0.1 % (ref 0–1.8)
BILIRUB UR QL STRIP: (no result)
BLOOD UR QL VISUAL: (no result)
BUN SERPL-MCNC: 7 MG/DL (ref 7–17)
BUN/CREAT SERPL: 9 %
CALCIUM SERPL-MCNC: 7.7 MG/DL (ref 8.4–10.2)
EOSINOPHIL # BLD AUTO: 0 K/MM3 (ref 0–0.4)
EOSINOPHIL NFR BLD AUTO: 0 % (ref 0–4.3)
HCT VFR BLD CALC: 33.3 % (ref 30.3–42.9)
HCT VFR BLD CALC: 35 % (ref 30.3–42.9)
HDLC SERPL-MCNC: 60 MG/DL (ref 40–59)
HEMOLYSIS INDEX: 0
HGB BLD-MCNC: 11.2 GM/DL (ref 10.1–14.3)
HGB BLD-MCNC: 12 GM/DL (ref 10.1–14.3)
LYMPHOCYTES # BLD AUTO: 0.6 K/MM3 (ref 1.2–5.4)
LYMPHOCYTES NFR BLD AUTO: 3.5 % (ref 13.4–35)
MCHC RBC AUTO-ENTMCNC: 34 % (ref 30–34)
MCV RBC AUTO: 92 FL (ref 79–97)
MONOCYTES # (AUTO): 1.4 K/MM3 (ref 0–0.8)
MONOCYTES % (AUTO): 7.7 % (ref 0–7.3)
PH UR STRIP: 5 [PH] (ref 5–7)
PLATELET # BLD: 152 K/MM3 (ref 140–440)
RBC # BLD AUTO: 3.79 M/MM3 (ref 3.65–5.03)
RBC #/AREA URNS HPF: 59 /HPF (ref 0–6)
UROBILINOGEN UR-MCNC: < 2 MG/DL (ref ?–2)
WBC #/AREA URNS HPF: 37 /HPF (ref 0–6)

## 2019-07-06 PROCEDURE — 4A033R1 MEASUREMENT OF ARTERIAL SATURATION, PERIPHERAL, PERCUTANEOUS APPROACH: ICD-10-PCS | Performed by: OBSTETRICS & GYNECOLOGY

## 2019-07-06 PROCEDURE — 0BH17EZ INSERTION OF ENDOTRACHEAL AIRWAY INTO TRACHEA, VIA NATURAL OR ARTIFICIAL OPENING: ICD-10-PCS | Performed by: OBSTETRICS & GYNECOLOGY

## 2019-07-06 PROCEDURE — 5A1945Z RESPIRATORY VENTILATION, 24-96 CONSECUTIVE HOURS: ICD-10-PCS | Performed by: OBSTETRICS & GYNECOLOGY

## 2019-07-06 RX ADMIN — Medication SCH ML: at 23:47

## 2019-07-06 RX ADMIN — Medication SCH ML: at 01:04

## 2019-07-06 RX ADMIN — PIPERACILLIN AND TAZOBACTAM SCH MLS/HR: 4; .5 INJECTION, POWDER, FOR SOLUTION INTRAVENOUS at 17:29

## 2019-07-06 RX ADMIN — FAMOTIDINE SCH MG: 10 INJECTION, SOLUTION INTRAVENOUS at 23:50

## 2019-07-06 RX ADMIN — PIPERACILLIN AND TAZOBACTAM SCH MLS/HR: 4; .5 INJECTION, POWDER, FOR SOLUTION INTRAVENOUS at 10:01

## 2019-07-06 RX ADMIN — Medication SCH ML: at 10:02

## 2019-07-06 RX ADMIN — FAMOTIDINE SCH MG: 10 INJECTION, SOLUTION INTRAVENOUS at 10:01

## 2019-07-06 RX ADMIN — PIPERACILLIN AND TAZOBACTAM SCH MLS/HR: 4; .5 INJECTION, POWDER, FOR SOLUTION INTRAVENOUS at 02:20

## 2019-07-06 NOTE — EVENT NOTE
EDMD intubation note








Called to floor under a CODE BLUE collar upon my arrival was performed the 

patient never lost pulse and became hypoxic with decreased responsiveness.  

Hospitalist is at the bedside requesting intubation. The patient was intubated 

via orotracheal route using a 7.0 mm endotracheal tube.  Rapid sequence 

induction was used lysed and succinylcholine 100 mg IV and etomidate 20 mg IV.  

Positioning was confirmed using auscultation and CO2 detector.  Post intubation 

chest xray was ordered. 





Pulmonary: Breath sounds equal bilaterally.  White frothy sputum visualized 

coming from between the vocal cords


Cardiovascular: Regular rate and rhythm


Neuro: GCS 3T





Patient left in the care of the hospitalist Dr. Faviola Mast at bedside

## 2019-07-06 NOTE — CONSULTATION
History of Present Illness


Consult date: 19 (seen at 11:30 pm)


Reason for consult: dyspnea


History of present illness: 





37yo female admitted in labor. Now sp csection. She had cxr done  showed 

bilat pul opacities. echo done showed nl ef. She had progression of her resp 

distress. As per nurse in charge, pt reported that her o2 sat started alarming 

after eating dinner. Bipap was reportly attempted and pt was more agitated.


 Her sats were still low and she was intubated for airway protection and severe 

hypoxia.





On arrival pt was noted to be agitated. Abg noted. Acute resp acidosis.


She recieved morphine. O2 sat still decrease. Increase in minute vent w increase

in resp rate . Not tolerated. Rate decreased to back to 25.


No family at bedside





Past History


Past Medical History: other (herpes simplex 2, gestational diabetes, depression,

general anxiety).  denies: CAD, COPD, hypertension


Past Surgical History:  (x1 )


Social history: lives with family


Family history: no significant family history





Medications and Allergies


                                    Allergies











Allergy/AdvReac Type Severity Reaction Status Date / Time


 


No Known Allergies Allergy   Verified 19 19:31











                                Home Medications











 Medication  Instructions  Recorded  Confirmed  Last Taken  Type


 


No Known Home Medications [No  19 Unknown History





Reported Home Medications]     











Active Meds: 


Active Medications





Al Hydrox/Mg Hydrox/Simethicone (Alum-Mag Hydrox-Simeth [id number]/5ml)  30 ml

PO Q4H PRN


   PRN Reason: Indigestion


Bisacodyl (Dulcolax)  10 mg MI QDAY PRN


   PRN Reason: constipation unrelieved by MOM


Diphtheria/Tetanus/Acell Pertussis (Boostrix)  0.5 ml IM .ONCE ONE


   Stop: 19 06:01


Famotidine (Pepcid)  20 mg IV BID AYAKA


   Last Admin: 19 10:01 Dose:  20 mg


   Documented by: 


Fentanyl (Sublimaze)  50 mcg IV Q10MIN PRN


   PRN Reason: ANALGESIA


Furosemide (Lasix)  80 mg IV ONCE ONE


   Stop: 19 06:01


Hydrophilic Ointment (Vaseline Lip Therapy)  1 applic TP Q2HR PRN


   PRN Reason: Dry Lips


Oxytocin/Sodium Chloride (Pitocin/Ns 20 Unit/1000ml Drip)  20 units in 1,000 mls

@ 250 mls/hr IV AS DIRECT AYAKA


Piperacillin Sod/Tazobactam Sod (Zosyn/Ns 4.5gm/100ml)  4.5 gm in 100 mls @ 200 

mls/hr IV Q8H AYAKA; Protocol


   Last Admin: 19 17:29 Dose:  200 mls/hr


   Documented by: 


Fentanyl Citrate (Fentanyl Drip Premix)  2,000 mcg in 100 mls @ 3.856 mls/hr IV 

TITR AYAKA; Protocol


Midazolam HCl 100 mg/ Sodium (Chloride)  100 mls @ 2 mls/hr IV TITR AYAKA; 

Protocol


Magnesium Hydroxide (Milk Of Magnesia)  30 ml PO Q4H PRN


   PRN Reason: Constipation


Metoclopramide HCl (Reglan)  10 mg IV Q6H PRN


   PRN Reason: Nausea And Vomiting


Midazolam HCl (Versed)  5 mg IV ONCE NR


   Stop: 19 23:59


Midazolam HCl (Versed)  2 mg IV Q10MIN PRN


   PRN Reason: Sedation


Morphine Sulfate (Morphine)  2 mg IV Q4H PRN


   PRN Reason: Pain, Moderate (4-6)


Multi-Ingred Cream/Lotion/Oil/Oint (Artificial Tears Ophth Oint)  1 applic OU 

Q4HR PRN


   PRN Reason: Dry Eye(s)


Multi-Ingredient Ointment (Lansinoh)  1 applic TP PRN PRN


   PRN Reason: dryness/cracking


Naloxone HCl (Narcan 0.4 Mg/1 Ml)  0.1 mg IV Q2MIN PRN


   PRN Reason: Res Rate </= 8 or 02 SAT < 92%


Potassium Chloride (Potassium Chloride)  20 meq FEEDTUBE ONCE ONE


   Stop: 19 06:01


Sodium Chloride (Sodium Chloride Flush Syringe 10 Ml)  10 ml IV BID AYAKA


   Last Admin: 19 10:02 Dose:  10 ml


   Documented by: 


Sodium Chloride (Sodium Chloride Flush Syringe 10 Ml)  10 ml IV PRN PRN


   PRN Reason: LINE FLUSH


Witch Hazel/Glycerin (Tucks Pad)  1 each TP PRN PRN


   PRN Reason: Hemorrhoids/cleansing/soothing











Review of Systems


ROS unobtainable: due to endotracheal tube





Physical Examination


Vital signs: 


                                   Vital Signs











Pulse BP


 


 85   177/85 


 


 19 18:02  19 18:02











General appearance: agitated


Eyes: non-icteric


ENT: oropharynx moist


Neck: supple


Ascultation: Bilateral: rales, rhonchi


Cardiovascular: other (tachycardiac)


Gastrointestinal: hypoactive bowel sounds


Integumentary: normal


Extremities: no cyanosis


other (orally intubated on vent)





Results





- Laboratory Findings


CBC and BMP: 


                                 19 22:35





                                19 Unknown


Abnormal lab findings: 


                                  Abnormal Labs











  19





  19:45 16:12 16:12


 


WBC  12.3 H  16.3 H 


 


RDW  16.9 H  16.6 H 


 


Lymph % (Auto)   


 


Mono % (Auto)   


 


Lymph #   


 


Mono #   


 


Seg Neutrophils %   


 


Seg Neutrophils #   


 


Chloride   


 


Carbon Dioxide   


 


Creatinine    0.5 L


 


Glucose   


 


Calcium   


 


Lactate Dehydrogenase    248 H


 


Total Creatine Kinase   


 


CK-MB (CK-2)   


 


CK-MB (CK-2) Rel Index   


 


Troponin T   


 


NT-Pro-B Natriuret Pep   


 


Triglycerides   


 


Cholesterol   


 


HDL Cholesterol   


 


Urine WBC (Auto)   














  19





  23:41 23:41 23:41


 


WBC  18.3 H  


 


RDW  16.6 H  


 


Lymph % (Auto)  3.5 L  


 


Mono % (Auto)  7.7 H  


 


Lymph #  0.6 L  


 


Mono #  1.4 H  


 


Seg Neutrophils %  88.7 H  


 


Seg Neutrophils #  16.2 H  


 


Chloride   


 


Carbon Dioxide   


 


Creatinine   


 


Glucose   


 


Calcium   


 


Lactate Dehydrogenase   


 


Total Creatine Kinase   


 


CK-MB (CK-2)   


 


CK-MB (CK-2) Rel Index   


 


Troponin T    0.274 H*


 


NT-Pro-B Natriuret Pep   5198 H 


 


Triglycerides    540 H


 


Cholesterol    291 H


 


HDL Cholesterol    60 H


 


Urine WBC (Auto)   














  19





  23:41 04:40 04:40


 


WBC   


 


RDW   


 


Lymph % (Auto)   


 


Mono % (Auto)   


 


Lymph #   


 


Mono #   


 


Seg Neutrophils %   


 


Seg Neutrophils #   


 


Chloride    108.3 H


 


Carbon Dioxide    21 L


 


Creatinine   


 


Glucose    195 H


 


Calcium    7.7 L


 


Lactate Dehydrogenase   


 


Total Creatine Kinase  155 H  


 


CK-MB (CK-2)  11.4 H  


 


CK-MB (CK-2) Rel Index  7.3 H  


 


Troponin T   0.177 H* D 


 


NT-Pro-B Natriuret Pep   


 


Triglycerides   


 


Cholesterol   


 


HDL Cholesterol   


 


Urine WBC (Auto)   














  19





  08:35 09:47


 


WBC  


 


RDW  


 


Lymph % (Auto)  


 


Mono % (Auto)  


 


Lymph #  


 


Mono #  


 


Seg Neutrophils %  


 


Seg Neutrophils #  


 


Chloride  


 


Carbon Dioxide  


 


Creatinine  


 


Glucose  


 


Calcium  


 


Lactate Dehydrogenase  


 


Total Creatine Kinase  


 


CK-MB (CK-2)  


 


CK-MB (CK-2) Rel Index  


 


Troponin T   0.157 H*


 


NT-Pro-B Natriuret Pep  


 


Triglycerides  


 


Cholesterol  


 


HDL Cholesterol  


 


Urine WBC (Auto)  37.0 H 














- Diagnostic Findings


Chest x-ray: report reviewed, image reviewed





Assessment and Plan





- Patient Problems


(1) Acute respiratory acidosis


Current Visit: Yes   Status: Acute   





(2) Acute respiratory failure


Current Visit: Yes   Status: Acute   





(3) Acute respiratory failure with hypercapnia


Current Visit: Yes   Status: Acute   





(4) Electrolyte abnormality


Current Visit: Yes   Status: Acute   





(5) Elevated troponin


Current Visit: Yes   Status: Acute   





(6) Gestational hypertension


Current Visit: Yes   Status: Acute   


Qualifiers: 


   Trimester: third trimester   Qualified Code(s): O13.3 - Gestational 

[pregnancy-induced] hypertension without significant proteinuria, third trimest

er   





(7) Pulmonary edema


Current Visit: Yes   Status: Acute   





(8) S/P 


Current Visit: Yes   Status: Acute

## 2019-07-06 NOTE — EVENT NOTE
Date: 07/06/19





Notified by Dr. Mast of pt change in status and current condition. I spoke with

support person whom I spoke with earlier an answered and addressed any questions

she had as well as pt  who was on the phone(he stated he had not 

questions at this time.) I advised that pt condition does warrent him to be 

present if possible. Pt now intubated and being stablized at this time. Family 

stated that they had no further questions for me a this time.

## 2019-07-06 NOTE — XRAY REPORT
CHEST 1 VIEW 10:02 PM



INDICATION: 

ETT placement.



COMPARISON: 

Earlier the same day.



FINDINGS:



Support devices: Endotracheal tube has been placed in satisfactory position.



Heart: Stable. 



Lungs/Pleura: Extensive bilateral pulmonary opacities may be slightly worsened. No pneumothorax is se
en.  







IMPRESSION:

1. Endotracheal tube has been placed in satisfactory position.



Signer Name: Bryan Galvan MD 

Signed: 7/6/2019 10:44 PM

 Workstation Name: [id number]

## 2019-07-06 NOTE — PROGRESS NOTE
Assessment and Plan





- Patient Problems


(1) 38 weeks gestation of pregnancy


Current Visit: Yes   Status: Ruled-out   





(2) Prolonged latent phase of labor


Current Visit: Yes   Status: Ruled-out   





(3) Oligohydramnios antepartum


Current Visit: Yes   Status: Ruled-out   





(4) Positive GBS test


Current Visit: Yes   Status: Acute   





(5) SROM (spontaneous rupture of membranes)


Current Visit: Yes   Status: Ruled-out   





(6) S/P primary low transverse 


Current Visit: Yes   Status: Acute   





(7) Gestational hypertension


Current Visit: Yes   Status: Acute   


Qualifiers: 


   Trimester: third trimester   Qualified Code(s): O13.3 - Gestational [preg

marija-induced] hypertension without significant proteinuria, third trimester   


Plan to address problem: 


-bp is stable at this time on no meds


-con't to monitor closely








(8) Pulmonary edema


Current Visit: Yes   Status: Acute   


Plan to address problem: 


-s/p echo and cardiology has seen pt with thanks. Pt is also being followed by 

hospitalist team


-con't current management and await recomendations


-currently on zosyn 








Subjective





- Subjective


Date of service: 19


Principal diagnosis: 1) POD #1 S/P 1LTCS 2)LOW UOP 3) gestatinal hypertension 4)

pulmonary edema


Interval history: 


Pt has no c/o this am. She is tolerating a clear diet that was started this am. 

I advised that she is to really try to increase po hydration as her UOP seems to

be borderline to low. I advised that until it is adequate we will keep the bah

cath in place for now. She denies any pain, chest discomfort or SOB. She did 

inquire about seeing the baby. I advised that when she is transferred to mother 

baby she will be able to see the baby at this time but currently she is being 

closely monitored and the  is not allowed in the NICU. She expressed 

understanding. Pt had several questions regarding the GDM. I advised that the 

testing will be repeated after 6 wks but that at this time she will not need to 

con't accuchecks when she goes home and my start a regular diet. she expressed 

understanding and all questions were addressed and answered.


Patient reports: appetite normal, voiding normally (cath in place. urine seem 

concentrated; upo is borderline low), pain well controlled, no flatus, no bowel 

movement


: doing well, other (in holding nursery due to mom being in MICU)





Objective





- Vital Signs


Latest vital signs: 


                                   Vital Signs











  Temp Pulse Resp BP BP Pulse Ox


 


 19 12:00  97.7 F  69  11 L  120/70   92


 


 19 11:00   81  19  117/68   91


 


 19 10:00   72  10 L  117/69   89


 


 19 09:00   86  9 L  105/60   93


 


 19 08:00  98.4 F  72  9 L  100/58   91


 


 19 07:00   80  15  101/53   92


 


 19 06:41   76  11 L  112/58   95


 


 19 06:31   78  10 L  112/58   95


 


 19 06:21   77  11 L  112/58   95


 


 19 06:11   77  11 L  113/57   94


 


 19 06:00   77  10 L  113/57   90


 


 19 05:51   77  25 H  112/62   95


 


 19 05:41   70  13  112/62   94


 


 19 05:31   76  8 L  112/62   95


 


 19 05:21   71  10 L  112/62   95


 


 19 05:11   78  12  112/58   94


 


 19 05:00   76  10 L  112/58   91


 


 19 04:51   74  11 L  96/51   94


 


 19 04:41   80  13  96/51   79 L


 


 19 04:31   75  19  96/51   93


 


 19 04:21   73  12  96/51   93


 


 19 04:11   81  12  112/62   95


 


 19 04:00   78  13  112/62   91


 


 19 03:51   87  15  96/51   95


 


 19 03:41   84  12  96/51   96


 


 19 03:31   77  11 L  96/51   95


 


 19 03:27  98.2 F     


 


 19 03:21   83  11 L  96/51   94


 


 19 03:11   80  16  96/51   95


 


 19 03:01   76  17  96/51   92


 


 19 02:51   82  17  92/56   94


 


 19 02:41   85  15  92/56   93


 


 19 02:31   86  13  92/56   94


 


 19 02:21   84  15  92/56   95


 


 19 02:11   89  22  82/44   94


 


 19 02:00   80  16  82/44   91


 


 19 01:51   85  21  95/45   92


 


 19 01:41   83  19  95/45   94


 


 19 01:31   85  14  95/45   92


 


 19 01:21   83  18  95/45   93


 


 19 01:11   101 H  14  95/45   93


 


 19 01:00   89  15  107/67   93


 


 19 00:51   88  18  107/67   94


 


 19 00:41   88  18  107/67   93


 


 19 00:31   92 H  19  107/67   93


 


 19 00:21   91 H  26 H  107/67   93


 


 19 00:11   94 H  18  107/67   93


 


 19 00:00   99 H  21  107/67   83 L


 


 19 23:51   106 H  22  109/71   89


 


 19 23:41   107 H  16  109/71   91


 


 19 23:31   109 H  21  109/71   92


 


 19 23:20   100 H  28 H  109/71   93


 


 19 23:11   104 H  17  112/61   89


 


 19 23:00   105 H  19  112/61   93


 


 19 22:55   111 H  16   


 


 19 21:45  97.8 F     


 


 19 21:40  97.4 F L  120 H  22   93/54  91


 


 19 21:35  97.4 F L  124 H  24   81/49  91


 


 19 21:32  97.4 F L     


 


 19 21:30  97.4 F L  122 H  30 H   86/40  92


 


 19 19:49   131 H     92


 


 19 19:44   128 H     92


 


 19 19:41   125 H     94


 


 19 19:39   124 H     93


 


 19 19:34   116 H     93


 


 19 19:32   123 H   144/80  


 


 19 19:29   116 H     95


 


 19 19:25  97.9 F   18   


 


 19 19:24   116 H     93


 


 19 19:21   114 H   194/87  


 


 19 19:19   111 H     94


 


 19 19:14   117 H     96


 


 19 19:13   104 H     90


 


 19 19:09   113 H     95


 


 19 19:08   113 H   193/98  


 


 19 19:07   112 H   193/98   94


 


 19 19:04   114 H     94


 


 19 19:00   114 H     94


 


 19 18:59   110 H     96


 


 19 18:54   112 H     96


 


 19 18:49   109 H     95


 


 19 18:48   108 H   169/79  


 


 19 18:44   112 H     95


 


 19 18:42   112 H   165/81  


 


 19 18:40       74 L


 


 19 18:39   106 H   176/92   95


 


 19 18:34   108 H     97


 


 19 18:33   106 H   173/87  


 


 19 18:29   105 H   171/81   97


 


 19 18:24   109 H     96


 


 19 18:23   108 H   148/74  


 


 19 18:19   107 H     96


 


 19 18:18   104 H   149/79  


 


 19 18:14   106 H     97


 


 19 18:13   106 H   145/82  


 


 19 18:09   112 H     96


 


 19 18:07   105 H   122/72  


 


 19 18:04   103 H     96


 


 19 18:02   106 H   113/64  


 


 19 17:59   110 H     97


 


 19 17:57   108 H   108/56  


 


 19 17:54   103 H     96


 


 19 17:52   104 H   108/55  


 


 19 17:49   110 H     96


 


 19 17:47   116 H   111/58  


 


 19 17:44   111 H     97


 


 19 17:43   114 H   111/69  


 


 19 17:42   102 H     94


 


 19 17:39   115 H     98


 


 19 17:37   95 H   115/66  


 


 19 17:34   100 H     92


 


 19 17:33   106 H   112/60  


 


 19 17:29   121 H     91


 


 19 17:28   125 H   90/52   93


 


 19 17:24   116 H     91


 


 19 17:19   126 H     93


 


 19 17:18   118 H   83/43  


 


 19 17:14   107 H     91


 


 19 17:12   117 H   115/59  


 


 19 17:09   121 H     94


 


 19 17:06       92


 


 19 17:03       100


 


 19 16:57   110 H     89


 


 19 16:56   116 H     92


 


 19 16:52   153 H   76/43  


 


 19 16:50   137 H   81/38  


 


 19 16:48   65     88


 


 19 16:47   64     89


 


 19 16:42   150 H     90


 


 19 16:41   148 H   121/57  


 


 19 16:38   150 H   148/67  


 


 19 16:37   60     78 L


 


 19 16:31   133 H   142/63   93


 


 19 16:29   136 H   150/66  


 


 19 16:27   136 H   160/66  


 


 19 16:26   134 H     92


 


 19 16:25   130 H   186/90  


 


 19 16:23   141 H   185/99  


 


 19 16:21   144 H     93


 


 19 16:20       92


 


 19 16:15   140 H     93


 


 19 16:11   45 L     94


 


 19 16:10   137 H     90


 


 19 16:05   138 H     91


 


 19 16:03   138 H     90


 


 19 16:00   141 H     86


 


 19 15:55   131 H     94


 


 19 15:52   139 H   143/81  


 


 19 15:50   131 H     90


 


 19 15:48   133 H   137/90  


 


 19 15:45   132 H   265/148   88


 


 19 15:42   125 H   221/96  


 


 07/05/19 15:40   136 H     70 L


 


 19 15:39       72 L


 


 19 15:13   124 H     79 L


 


 19 15:08   121 H     76 L


 


 19 15:07   116 H   172/111  


 


 19 15:03   122 H     76 L


 


 19 14:58   121 H     73 L


 


 19 14:53   113 H     86


 


 19 14:48   118 H     89


 


 19 14:43   113 H     87


 


 19 14:38   114 H     88


 


 19 14:36   109 H   139/62  


 


 19 14:33   118 H     76 L


 


 19 14:28   117 H     93


 


 19 14:23   114 H     92


 


 19 14:22   113 H   169/93  


 


 19 14:20   116 H     94


 


 19 14:18   108 H     91


 


 19 14:13   110 H     93


 


 19 13:22   103 H   155/84  


 


 19 13:06   109 H   135/77  


 


 19 12:51   105 H   137/70  


 


 19 12:36   94 H   143/76  








                                Intake and Output











 19





 22:59 06:59 14:59


 


Intake Total 852.5 100 


 


Output Total 975 300 


 


Balance -122.5 -200 


 


Intake:   


 


  .5 100 


 


    AMPICILLIN/NS 1 GM/50 ML 50  





    1 gm In 50 ml @ 100 mls/   





    hr IV Q4HR AYAKA Rx#:   





    [id number]   


 


    PITOCin/NS 30 UNIT/500ML 2.5  





    30 units In 500 ml @ 4   





    MILLIUNITS/MIN 4 mls/hr   





    IV TITR AYAKA Rx#:[id number]   


 


    ZOSYN/NS 4.5GM/100ML 4.5  100 





    gm In 100 ml @ 200 mls/hr   





    IV Q8H AYAKA Rx#:[id number]   


 


Output:   


 


  Urine 975 300 


 


    Indwelling Catheter 800 300 


 


Other:   


 


  Total, Output Amount 300 300 


 


  Voiding Method   Indwelling Catheter


 


  Weight   77.111 kg


 


  Estimated Blood Loss 300  








                                 Patient Weight











 19





 06:59


 


Weight 77.111 kg














- Exam


Cardiovascular: Present: Normal S1, Normal S2


Lungs: Present: Clear to auscultation, Normal air movement


Abdomen: Present: normal appearance, soft, normal bowel sounds.  Absent: 

distention, tenderness, guarding


Uterus: Present: normal, firm, fundal height below umbilicus.  Absent: 

bogginess, tenderness


Extremities: Present: normal, edema (scds in place).  Absent: tenderness


Incision: Present: normal, dry, intact, dressed.  Absent: erythematous, 

suppurative, edematous, skin 





- Labs


Labs: 


                              Abnormal lab results











  19 Range/Units





  16:12 16:12 23:41 


 


WBC  16.3 H   18.3 H  (4.5-11.0)  K/mm3


 


RDW  16.6 H   16.6 H  (13.2-15.2)  %


 


Lymph % (Auto)    3.5 L  (13.4-35.0)  %


 


Mono % (Auto)    7.7 H  (0.0-7.3)  %


 


Lymph #    0.6 L  (1.2-5.4)  K/mm3


 


Mono #    1.4 H  (0.0-0.8)  K/mm3


 


Seg Neutrophils %    88.7 H  (40.0-70.0)  %


 


Seg Neutrophils #    16.2 H  (1.8-7.7)  K/mm3


 


Chloride     ()  mmol/L


 


Carbon Dioxide     (22-30)  mmol/L


 


Creatinine   0.5 L   (0.7-1.2)  mg/dL


 


Glucose     ()  mg/dL


 


Calcium     (8.4-10.2)  mg/dL


 


Lactate Dehydrogenase   248 H   ()  units/L


 


Total Creatine Kinase     ()  units/L


 


CK-MB (CK-2)     (0.0-4.0)  ng/mL


 


CK-MB (CK-2) Rel Index     (0-4)  


 


Troponin T     (0.00-0.029)  ng/mL


 


NT-Pro-B Natriuret Pep     (0-450)  pg/mL


 


Triglycerides     (2-149)  mg/dL


 


Cholesterol     ()  mg/dL


 


HDL Cholesterol     (40-59)  mg/dL


 


Urine WBC (Auto)     (0.0-6.0)  /HPF














  19 Range/Units





  23:41 23:41 23:41 


 


WBC     (4.5-11.0)  K/mm3


 


RDW     (13.2-15.2)  %


 


Lymph % (Auto)     (13.4-35.0)  %


 


Mono % (Auto)     (0.0-7.3)  %


 


Lymph #     (1.2-5.4)  K/mm3


 


Mono #     (0.0-0.8)  K/mm3


 


Seg Neutrophils %     (40.0-70.0)  %


 


Seg Neutrophils #     (1.8-7.7)  K/mm3


 


Chloride     ()  mmol/L


 


Carbon Dioxide     (22-30)  mmol/L


 


Creatinine     (0.7-1.2)  mg/dL


 


Glucose     ()  mg/dL


 


Calcium     (8.4-10.2)  mg/dL


 


Lactate Dehydrogenase     ()  units/L


 


Total Creatine Kinase    155 H  ()  units/L


 


CK-MB (CK-2)    11.4 H  (0.0-4.0)  ng/mL


 


CK-MB (CK-2) Rel Index    7.3 H  (0-4)  


 


Troponin T   0.274 H*   (0.00-0.029)  ng/mL


 


NT-Pro-B Natriuret Pep  5198 H    (0-450)  pg/mL


 


Triglycerides   540 H   (2-149)  mg/dL


 


Cholesterol   291 H   ()  mg/dL


 


HDL Cholesterol   60 H   (40-59)  mg/dL


 


Urine WBC (Auto)     (0.0-6.0)  /HPF














  19 Range/Units





  04:40 04:40 08:35 


 


WBC     (4.5-11.0)  K/mm3


 


RDW     (13.2-15.2)  %


 


Lymph % (Auto)     (13.4-35.0)  %


 


Mono % (Auto)     (0.0-7.3)  %


 


Lymph #     (1.2-5.4)  K/mm3


 


Mono #     (0.0-0.8)  K/mm3


 


Seg Neutrophils %     (40.0-70.0)  %


 


Seg Neutrophils #     (1.8-7.7)  K/mm3


 


Chloride   108.3 H   ()  mmol/L


 


Carbon Dioxide   21 L   (22-30)  mmol/L


 


Creatinine     (0.7-1.2)  mg/dL


 


Glucose   195 H   ()  mg/dL


 


Calcium   7.7 L   (8.4-10.2)  mg/dL


 


Lactate Dehydrogenase     ()  units/L


 


Total Creatine Kinase     ()  units/L


 


CK-MB (CK-2)     (0.0-4.0)  ng/mL


 


CK-MB (CK-2) Rel Index     (0-4)  


 


Troponin T  0.177 H* D    (0.00-0.029)  ng/mL


 


NT-Pro-B Natriuret Pep     (0-450)  pg/mL


 


Triglycerides     (2-149)  mg/dL


 


Cholesterol     ()  mg/dL


 


HDL Cholesterol     (40-59)  mg/dL


 


Urine WBC (Auto)    37.0 H  (0.0-6.0)  /HPF














  19 Range/Units





  09:47 


 


WBC   (4.5-11.0)  K/mm3


 


RDW   (13.2-15.2)  %


 


Lymph % (Auto)   (13.4-35.0)  %


 


Mono % (Auto)   (0.0-7.3)  %


 


Lymph #   (1.2-5.4)  K/mm3


 


Mono #   (0.0-0.8)  K/mm3


 


Seg Neutrophils %   (40.0-70.0)  %


 


Seg Neutrophils #   (1.8-7.7)  K/mm3


 


Chloride   ()  mmol/L


 


Carbon Dioxide   (22-30)  mmol/L


 


Creatinine   (0.7-1.2)  mg/dL


 


Glucose   ()  mg/dL


 


Calcium   (8.4-10.2)  mg/dL


 


Lactate Dehydrogenase   ()  units/L


 


Total Creatine Kinase   ()  units/L


 


CK-MB (CK-2)   (0.0-4.0)  ng/mL


 


CK-MB (CK-2) Rel Index   (0-4)  


 


Troponin T  0.157 H*  (0.00-0.029)  ng/mL


 


NT-Pro-B Natriuret Pep   (0-450)  pg/mL


 


Triglycerides   (2-149)  mg/dL


 


Cholesterol   ()  mg/dL


 


HDL Cholesterol   (40-59)  mg/dL


 


Urine WBC (Auto)   (0.0-6.0)  /HPF

## 2019-07-06 NOTE — PROGRESS NOTE
Assessment and Plan


Assessment and plan: 





38-year-old  female who is s/p  POD# 0.  Patient is transferred

to [facility] for management of pulmonary edema.  Chest x-ray showed mild cardiomegaly

and pulmonary edema.








Pulmonary edema


Labile blood pressure


Sinus tachycardia


S/E  POD #0


GBS positive


History of herpes simplex 2


History of preeclampsia








Plan:


Continue supportive care


BP stable


Echocardiogram pending


Cardiology consulted


Monitor BP


Hold off on giving volume d/t pulmonary edema


All other care management per primary team


DVT PPX on Lovenox











History


Interval history: 


Feels better,


No shortness of breath


No chest pain


Mild lower abd pain








Hospitalist Physical





- Physical exam


Narrative exam: 


Gen: Not in acute distress, lying in bed, 


HEENT: Normocephalic, atraumatic


Neck: supple, no JVD


Heart: S1 and S2 reg, no murmurs, rubs or gallop


Lungs: Bilateral basal crackles, no wheeze


Abd: soft, non tender, non distended, normal BS


Ext: No edema, no clubbing, no cyanosis, 


Neuro: Awake,alert, moves all ext, non focal








- Constitutional


Vitals: 


                                        











Temp Pulse Resp BP Pulse Ox


 


 97.4 F L  76   11 L  112/58   95 


 


 19 04:05  19 06:41  19 06:41  19 06:41  19 06:41











General appearance: Present: no acute distress, obese





Results





- Labs


CBC & Chem 7: 


                                 19 09:47





                                 19 04:40


Labs: 


                             Laboratory Last Values











WBC  18.3 K/mm3 (4.5-11.0)  H  19  23:41    


 


RBC  3.79 M/mm3 (3.65-5.03)   19  23:41    


 


Hgb  12.0 gm/dl (10.1-14.3)   19  23:41    


 


Hct  35.0 % (30.3-42.9)   19  23:41    


 


MCV  92 fl (79-97)   19  23:41    


 


MCH  32 pg (28-32)   19  23:41    


 


MCHC  34 % (30-34)   19  23:41    


 


RDW  16.6 % (13.2-15.2)  H  19  23:41    


 


Plt Count  152 K/mm3 (140-440)   19  23:41    


 


Lymph % (Auto)  3.5 % (13.4-35.0)  L  19  23:41    


 


Mono % (Auto)  7.7 % (0.0-7.3)  H  19  23:41    


 


Eos % (Auto)  0.0 % (0.0-4.3)   19  23:41    


 


Baso % (Auto)  0.1 % (0.0-1.8)   19  23:41    


 


Lymph #  0.6 K/mm3 (1.2-5.4)  L  19  23:41    


 


Mono #  1.4 K/mm3 (0.0-0.8)  H  19  23:41    


 


Eos #  0.0 K/mm3 (0.0-0.4)   19  23:41    


 


Baso #  0.0 K/mm3 (0.0-0.1)   19  23:41    


 


Seg Neutrophils %  88.7 % (40.0-70.0)  H  19  23:41    


 


Seg Neutrophils #  16.2 K/mm3 (1.8-7.7)  H  19  23:41    


 


Sodium  142 mmol/L (137-145)   19  04:40    


 


Potassium  3.8 mmol/L (3.6-5.0)   19  04:40    


 


Chloride  108.3 mmol/L ()  H  19  04:40    


 


Carbon Dioxide  21 mmol/L (22-30)  L  19  04:40    


 


  17 mmol/L  19  04:40    


 


BUN  7 mg/dL (7-17)   19  04:40    


 


  0.8 mg/dL (0.7-1.2)  D 19  04:40    


 


Estimated GFR  > 60 ml/min  19  04:40    


 


  9 %  19  04:40    


 


Glucose  195 mg/dL ()  H  19  04:40    


 


  4.7 mg/dL (3.5-7.6)   19  16:12    


 


Calcium  7.7 mg/dL (8.4-10.2)  L  19  04:40    


 


AST  33 units/L (5-40)   19  16:12    


 


ALT  24 units/L (7-56)   19  16:12    


 


  248 units/L ()  H  19  16:12    


 


  155 units/L ()  H  19  23:41    


 


CK-MB (CK-2)  11.4 ng/mL (0.0-4.0)  H  19  23:41    


 


CK-MB (CK-2) Rel Index  7.3  (0-4)  H  19  23:41    


 


  0.177 ng/mL (0.00-0.029)  H* D 19  04:40    


 


NT-Pro-B Natriuret Pep  5198 pg/mL (0-450)  H  19  23:41    


 


Triglycerides  540 mg/dL (2-149)  H  19  23:41    


 


Cholesterol  291 mg/dL ()  H  19  23:41    


 


  TNR   19  23:41    


 


  60 mg/dL (40-59)  H  19  23:41    


 


  4.85 %  19  23:41    


 


  Straw  (Yellow)   19  16:26    


 


  Clear  (Clear)   19  16:26    


 


  6.0  (5.0-7.0)   19  16:26    


 


Ur Specific Gravity  1.009  (1.003-1.030)   19  16:26    


 


  <15 mg/dl mg/dL (Negative)   19  16:26    


 


  >=500 mg/dL (Negative)   19  16:26    


 


  80 mg/dL (Negative)   19  16:26    


 


  Sm  (Negative)   19  16:26    


 


  Neg  (Negative)   19  16:26    


 


  Neg  (Negative)   19  16:26    


 


  < 2.0 mg/dL (<2.0)   19  16:26    


 


Ur Leukocyte Esterase  Neg  (Negative)   19  16:26    


 


  < 1.0 /HPF (0.0-6.0)   19  16:26    


 


  2.0 /HPF (0.0-6.0)   19  16:26    


 


U Epithel Cells (Auto)  < 1.0 /HPF (0-13.0)   19  16:26    


 


RPR  Nonreactive  (Nonreactive)   19  19:45    


 


Blood Type  O NEGATIVE   19  23:41    


 


Antibody Screen  Negative   19  23:41    


 


Fetal Screen  Negative   19  23:41    














Active Medications





- Current Medications


Current Medications: 














Generic Name Dose Route Start Last Admin





  Trade Name Freq  PRN Reason Stop Dose Admin


 


Al Hydrox/Mg Hydrox/Simethicone  30 ml  19 23:16 





  Alum-Mag Hydrox-Simeth 735-720-79wh/5ml  PO  





  Q4H PRN  





  Indigestion  


 


Bisacodyl  10 mg  19 23:16 





  Dulcolax  SD  





  QDAY PRN  





  constipation unrelieved by MOM  


 


Diphtheria/Tetanus/Acell Pertussis  0.5 ml  19 06:00 





  Boostrix  IM  19 06:01 





  .ONCE ONE  


 


Famotidine  20 mg  19 10:00 





  Pepcid  IV  





  BID AYAKA  


 


Oxytocin/Sodium Chloride  20 units in 1,000 mls @ 250 mls/hr  19 23:16 





  Pitocin/Ns 20 Unit/1000ml Drip  IV  





  AS DIRECT AYAKA  


 


Piperacillin Sod/Tazobactam Sod  4.5 gm in 100 mls @ 200 mls/hr  19 02:00 

19 02:20





  Zosyn/Ns 4.5gm/100ml  IV   200 mls/hr





  Q8H AYAKA   Administration





  Protocol  


 


Magnesium Hydroxide  30 ml  19 23:16 





  Milk Of Magnesia  PO  





  Q4H PRN  





  Constipation  


 


Metoclopramide HCl  10 mg  19 23:16 





  Reglan  IV  





  Q6H PRN  





  Nausea And Vomiting  


 


Morphine Sulfate  2 mg  19 09:30 





  Morphine  IV  





  Q4H PRN  





  Pain, Moderate (4-6)  


 


Morphine Sulfate  4 mg  19 09:30 





  Morphine  IV  





  Q4H PRN  





  Pain , Severe (7-10)  


 


Multi-Ingredient Ointment  1 applic  19 23:16 





  Lansinoh  TP  





  PRN PRN  





  dryness/cracking  


 


Naloxone HCl  0.1 mg  19 23:16 





  Narcan 0.4 Mg/1 Ml  IV  





  Q2MIN PRN  





  Res Rate </= 8 or 02 SAT < 92%  


 


Sodium Chloride  10 ml  19 23:16  19 01:04





  Sodium Chloride Flush Syringe 10 Ml  IV   10 ml





  BID AYAKA   Administration


 


Sodium Chloride  10 ml  19 23:16 





  Sodium Chloride Flush Syringe 10 Ml  IV  





  PRN PRN  





  LINE FLUSH  


 


Witch Hazel/Glycerin  1 each  19 23:16 





  Tucks Pad  TP  





  PRN PRN  





  Hemorrhoids/cleansing/soothing

## 2019-07-06 NOTE — CONSULTATION
History of Present Illness


Consult date: 19


Consult reason: congestive heart failure


History of present illness: 





38 year old female transferred to the unit with acute pulmonary edema.  She is 

post  section and I didn't time of my evaluation appears stable in no 

operative respiratory distress.  Heart chest x-ray shows borderline cardiomegaly

with some interstitial edema.





Past History


Past Medical History: other (herpes simplex 2, gestational diabetes, depression,

general anxiety).  denies: CAD, COPD, hypertension


Past Surgical History:  (x1 )


Social history: lives with family


Family history: no significant family history





Medications and Allergies


                                    Allergies











Allergy/AdvReac Type Severity Reaction Status Date / Time


 


No Known Allergies Allergy   Verified 19 19:31











                                Home Medications











 Medication  Instructions  Recorded  Confirmed  Last Taken  Type


 


No Known Home Medications [No  19 Unknown History





Reported Home Medications]     











Active Meds: 


Active Medications





Al Hydrox/Mg Hydrox/Simethicone (Alum-Mag Hydrox-Simeth 555-243-16kw/5ml)  30 ml

PO Q4H PRN


   PRN Reason: Indigestion


Bisacodyl (Dulcolax)  10 mg [place] QDAY PRN


   PRN Reason: constipation unrelieved by MOM


Diphtheria/Tetanus/Acell Pertussis (Boostrix)  0.5 ml IM .ONCE ONE


   Stop: 19 06:01


Famotidine (Pepcid)  20 mg IV BID AYAKA


Oxytocin/Sodium Chloride (Pitocin/Ns 20 Unit/1000ml Drip)  20 units in 1,000 mls

@ 250 mls/hr IV AS DIRECT AYAKA


Piperacillin Sod/Tazobactam Sod (Zosyn/Ns 4.5gm/100ml)  4.5 gm in 100 mls @ 200 

mls/hr IV Q8H AYAKA; Protocol


   Last Admin: 19 02:20 Dose:  200 mls/hr


   Documented by: 


Magnesium Hydroxide (Milk Of Magnesia)  30 ml PO Q4H PRN


   PRN Reason: Constipation


Metoclopramide HCl (Reglan)  10 mg IV Q6H PRN


   PRN Reason: Nausea And Vomiting


Morphine Sulfate (Morphine)  2 mg IV Q4H PRN


   PRN Reason: Pain, Moderate (4-6)


Morphine Sulfate (Morphine)  4 mg IV Q4H PRN


   PRN Reason: Pain , Severe (7-10)


Multi-Ingredient Ointment (Lansinoh)  1 applic TP PRN PRN


   PRN Reason: dryness/cracking


Naloxone HCl (Narcan 0.4 Mg/1 Ml)  0.1 mg IV Q2MIN PRN


   PRN Reason: Res Rate </= 8 or 02 SAT < 92%


Sodium Chloride (Sodium Chloride Flush Syringe 10 Ml)  10 ml IV BID AYAKA


   Last Admin: 19 01:04 Dose:  10 ml


   Documented by: 


Sodium Chloride (Sodium Chloride Flush Syringe 10 Ml)  10 ml IV PRN PRN


   PRN Reason: LINE FLUSH


Witch Hazel/Glycerin (Tucks Pad)  1 each TP PRN PRN


   PRN Reason: Hemorrhoids/cleansing/soothing











Review of Systems


All systems: negative (shortness of breath, no chest pain)





Physical Examination


                                   Vital Signs











Pulse BP


 


 85   177/85 


 


 19 18:02  19 18:02











General appearance: no acute distress, well-nourished


HEENT: Positive: PERRL, Mucus Membranes Moist


Neck: Positive: neck supple, trachea midline


Cardiac: Positive: Reg Rate and Rhythm, S1/S2.  Negative: Audible Murmur


Lungs: Positive: clear to auscultation, Normal Breath Sounds


Neuro: Positive: Grossly Intact


Abdomen: Positive: Soft, Active Bowel Sounds, Other (post C/S surgery).  

Negative: Tender, Distended


Female genitourinary: deferred


Skin: Positive: Clear


Incision: Cardiac Cath Site


Musculoskeletal: No Pain, Normal Range of Motion


Extremities: Present: normal.  Absent: edema





Results





                                 19 22:35





                                19 Unknown


                                 Cardiac Enzymes











  19 Range/Units





  16:12 23:41 


 


AST  33   (5-40)  units/L


 


Lactate Dehydrogenase  248 H   ()  units/L


 


CK-MB (CK-2)   11.4 H  (0.0-4.0)  ng/mL








                                     Lipids











  19 Range/Units





  23:41 


 


Triglycerides  540 H  (2-149)  mg/dL


 


Cholesterol  291 H  ()  mg/dL


 


HDL Cholesterol  60 H  (40-59)  mg/dL


 


Cholesterol/HDL Ratio  4.85  %








                                       CBC











  19 Range/Units





  16:12 23:41 


 


WBC  16.3 H  18.3 H  (4.5-11.0)  K/mm3


 


RBC  4.31  3.79  (3.65-5.03)  M/mm3


 


Hgb  13.5  12.0  (10.1-14.3)  gm/dl


 


Hct  39.4  35.0  (30.3-42.9)  %


 


Plt Count  195  152  (140-440)  K/mm3


 


Lymph #   0.6 L  (1.2-5.4)  K/mm3


 


Mono #   1.4 H  (0.0-0.8)  K/mm3


 


Eos #   0.0  (0.0-0.4)  K/mm3


 


Baso #   0.0  (0.0-0.1)  K/mm3








                          Comprehensive Metabolic Panel











  19 Range/Units





  16:12 04:40 


 


Sodium   142  (137-145)  mmol/L


 


Potassium   3.8  (3.6-5.0)  mmol/L


 


Chloride   108.3 H  ()  mmol/L


 


Carbon Dioxide   21 L  (22-30)  mmol/L


 


BUN   7  (7-17)  mg/dL


 


Creatinine  0.5 L  0.8  D  (0.7-1.2)  mg/dL


 


Glucose   195 H  ()  mg/dL


 


Calcium   7.7 L  (8.4-10.2)  mg/dL


 


AST  33   (5-40)  units/L


 


ALT  24   (7-56)  units/L














EKG interpretations





- Telemetry


EKG Rhythm: Sinus Rhythm





Assessment and Plan





1.  Mild acute pulmonary edema


2.  Status post  section





Plan.


Acute pulmonary edema probably precipitated by fluid overload.  Obtain an 

echocardiogram to assess global and regional function.  Mild diuresis as 

indicated.

## 2019-07-06 NOTE — EVENT NOTE
Date: 07/06/19





Pt only put out about 20cc/hr since 7am. Will con't to encourage po hydration as

due to pulmonary edema will not give fluid bolus. Pt is other wise stable. Will 

con't cath for now and cont to monitor urine out put.

## 2019-07-06 NOTE — CONSULTATION
<[address] - Last Filed: 19 00:18>





History of Present Illness





- Reason for Consult


Consult date: 19


pulmonary edema


Requesting physician: RK LEAL





- History of Present Illness





38-year-old  female  who presented to the ED earlier today with 

complaints of active labor.  Patient was found to be 37 weeks pregnant and in 

active labor.  She had spontaneous rupture of membranes and was started on 

Pitocin.  Prior to administration of epidural at approximately 1545 patient 

complained of acute onset shortness of breath was started around 1515 Onset of 

dyspnea with saturation in low to mid 70's on room air.  She was placed on 

nonrebreather with improvement of saturations to low 90s.  She was given IV 

Lasix 10 mg but was not responsive.  Ultimately patient was taken or for C-

section delivery.  Upon arrival to the IMCU patient is awake alert and oriented 

3.  Blood pressure continues to remain labile current blood pressure 109/71 

with heart rate 106 bpm.  She denies discomfort/pain at this time.





Past History


Past Medical History: other (herpes simplex 2, gestational diabetes, depression,

general anxiety)


Past Surgical History:  (x1 )


Social history: lives with family


Family history: no significant family history





Medications and Allergies


                                    Allergies











Allergy/AdvReac Type Severity Reaction Status Date / Time


 


No Known Allergies Allergy   Verified 19 19:31











                                Home Medications











 Medication  Instructions  Recorded  Confirmed  Last Taken  Type


 


No Known Home Medications [No  19 Unknown History





Reported Home Medications]     











Active Meds: 


Active Medications





Al Hydrox/Mg Hydrox/Simethicone (Alum-Mag Hydrox-Simeth 197-375-61pg/5ml)  30 ml

PO Q4H PRN


   PRN Reason: Indigestion


Bisacodyl (Dulcolax)  10 mg [place] QDAY PRN


   PRN Reason: constipation unrelieved by MOM


Diphtheria/Tetanus/Acell Pertussis (Boostrix)  0.5 ml IM .ONCE ONE


   Stop: 19 06:01


Famotidine (Pepcid)  20 mg IV BID AYAKA


Oxytocin/Sodium Chloride (Pitocin/Ns 20 Unit/1000ml Drip)  20 units in 1,000 mls

@ 250 mls/hr IV AS DIRECT AYAKA


Lactated Ringer's (Lactated Ringers)  1,000 mls @ 125 mls/hr IV AS DIRECT AYAKA


Cefazolin Sodium (Ancef/Ns 1 Gm/50 Ml)  1 gm in 50 mls @ 100 mls/hr IV Q8H AYAKA


   Stop: 19 09:29


Magnesium Hydroxide (Milk Of Magnesia)  30 ml PO Q4H PRN


   PRN Reason: Constipation


Metoclopramide HCl (Reglan)  10 mg IV Q6H PRN


   PRN Reason: Nausea And Vomiting


Morphine Sulfate (Morphine)  2 mg IV Q4H PRN


   PRN Reason: Pain, Moderate (4-6)


Morphine Sulfate (Morphine)  4 mg IV Q4H PRN


   PRN Reason: Pain , Severe (7-10)


Multi-Ingredient Ointment (Lansinoh)  1 applic TP PRN PRN


   PRN Reason: dryness/cracking


Naloxone HCl (Narcan 0.4 Mg/1 Ml)  0.1 mg IV Q2MIN PRN


   PRN Reason: Res Rate </= 8 or 02 SAT < 92%


Sodium Chloride (Sodium Chloride Flush Syringe 10 Ml)  10 ml IV BID AYAKA


Sodium Chloride (Sodium Chloride Flush Syringe 10 Ml)  10 ml IV PRN PRN


   PRN Reason: LINE FLUSH


Witch Hazel/Glycerin (Tucks Pad)  1 each TP PRN PRN


   PRN Reason: Hemorrhoids/cleansing/soothing











Review of Systems


All systems: negative (revealed an additional multiple complaints except as 

noted below)


Cardiovascular: shortness of breath


Respiratory: shortness of breath


Gastrointestinal: abdominal pain (related to the liver, status post  

earlier today)





Exam





- Physical Exam


Narrative exam: 





Physical exam





General appearance: Present: No acute distress, alert and oriented 3, well-

nourished, well-developed,  female, in immediate postpartum period





- EENT


Eyes: Present: PERRL, EOM intact


ENT: hearing intact, normal dentition





- Neck


Neck: Present: supple, normal ROM





- Respiratory


Respiratory effort: Non-labored


Respiratory: bilateral: diminished (bases)





- Cardiovascular


Heart rate: 106 (bpm)


Rhythm: Sinus tachycardia


Heart Sounds: Present: S1 & S2.  Absent: rub, click





- Extremities


Extremities: no ischemia, pulses intact, abnormal 








- Peripheral Assessment


Peripheral Pulses: within normal limits


 


- Abdominal


General gastrointestinal: soft, non-tender, slightly distended, normal bowel 

sounds





- Integumentary


Integumentary: Present: warm, dry





- Musculoskeletal


Musculoskeletal: Able to move all extremities, generalized weakness





-Neurological


Neurological CN II-XII intact





- Psychiatric


Psychiatric: cooperative





- Constitutional


Vitals: 


                                        











Temp Pulse Resp BP Pulse Ox


 


 97.4 F L  131 H  18   144/80   92 


 


 19 21:32  19 19:49  19 19:25  19 19:32  19 19:49














Results





- Labs


CBC & Chem 7: 


                                 19 23:41





                                 19 16:12


Labs: 


                              Abnormal lab results











  19 Range/Units





  16:12 16:12 


 


WBC  16.3 H   (4.5-11.0)  K/mm3


 


RDW  16.6 H   (13.2-15.2)  %


 


Creatinine   0.5 L  (0.7-1.2)  mg/dL


 


Lactate Dehydrogenase   248 H  ()  units/L














- Imaging and Cardiology


Chest x-ray: report reviewed (Impression: 1. Mild cardiomegaly and bilateral 

interstitial pulmonary edema. ), image reviewed





Assessment and Plan








38-year-old  female who is s/p  POD# 0.  Patient is transferred

to [facility] for management of pulmonary edema.  Chest x-ray showed mild cardiomegaly

and pulmonary edema.








Pulmonary edema


Labile blood pressure


Sinus tachycardia


S/E  POD #0


GBS positive


History of herpes simplex 2


History of preeclampsia








Plan:


Continue supportive care


Continuous monitoring monitoring her


Echocardiogram pending


CBC, BMPs, troponin, CK-MB pending


Cardiology consulted


Monitor BP


Hold off on giving volume d/t pulmonary edema


Cefazolin 1 g every 8 hours 2 doses


All other care management per primary team


DVT PPX on Lovenox








<JENN VELÁZQUEZ E - Last Filed: 19 22:34>





Medications and Allergies


Active Meds: 


Active Medications





Al Hydrox/Mg Hydrox/Simethicone (Alum-Mag Hydrox-Simeth 230-303-53bl/5ml)  30 ml

PO Q4H PRN


   PRN Reason: Indigestion


Bisacodyl (Dulcolax)  10 mg AK QDAY PRN


   PRN Reason: constipation unrelieved by MOM


Diphtheria/Tetanus/Acell Pertussis (Boostrix)  0.5 ml IM .ONCE ONE


   Stop: 19 06:01


Famotidine (Pepcid)  20 mg IV BID [facility]


Oxytocin/Sodium Chloride (Pitocin/Ns 20 Unit/1000ml Drip)  20 units in 1,000 mls

@ 250 mls/hr IV AS DIRECT AYAKA


Cefazolin Sodium (Ancef/Ns 1 Gm/50 Ml)  1 gm in 50 mls @ 100 mls/hr IV Q8H [facility]


   Stop: 19 09:29


   Last Admin: 19 01:02 Dose:  100 mls/hr


   Documented by: 


Piperacillin Sod/Tazobactam Sod (Zosyn/Ns 4.5gm/100ml)  4.5 gm in 100 mls @ 200 

mls/hr IV Q8H [facility]; Protocol


   Last Admin: 19 02:20 Dose:  200 mls/hr


   Documented by: 


Vancomycin HCl 2,000 mg/ (Sodium Chloride)  540 mls @ 250 mls/hr IV ONCE ONE


   Stop: 19 04:09


   Last Admin: 19 02:29 Dose:  250 mls/hr


   Documented by: 


Magnesium Hydroxide (Milk Of Magnesia)  30 ml PO Q4H PRN


   PRN Reason: Constipation


Metoclopramide HCl (Reglan)  10 mg IV Q6H PRN


   PRN Reason: Nausea And Vomiting


Morphine Sulfate (Morphine)  2 mg IV Q4H PRN


   PRN Reason: Pain, Moderate (4-6)


Morphine Sulfate (Morphine)  4 mg IV Q4H PRN


   PRN Reason: Pain , Severe (7-10)


Multi-Ingredient Ointment (Lansinoh)  1 applic TP PRN PRN


   PRN Reason: dryness/cracking


Naloxone HCl (Narcan 0.4 Mg/1 Ml)  0.1 mg IV Q2MIN PRN


   PRN Reason: Res Rate </= 8 or 02 SAT < 92%


Sodium Chloride (Sodium Chloride Flush Syringe 10 Ml)  10 ml IV BID [facility]


   Last Admin: 19 01:04 Dose:  10 ml


   Documented by: 


Sodium Chloride (Sodium Chloride Flush Syringe 10 Ml)  10 ml IV PRN PRN


   PRN Reason: LINE FLUSH


Witch Hazel/Glycerin (Tucks Pad)  1 each TP PRN PRN


   PRN Reason: Hemorrhoids/cleansing/soothing











Exam





- Constitutional


Vitals: 


                                        











Temp Pulse Resp BP Pulse Ox


 


 97.4 F L  131 H  18   144/80   92 


 


 07/05/19 21:32  19 19:49  19 19:25  19 19:32  19 19:49














Results





- Labs


CBC & Chem 7: 


                                 19 09:47





                                 19 04:40


Labs: 


                              Abnormal lab results











  19 Range/Units





  16:12 16:12 23:41 


 


WBC  16.3 H   18.3 H  (4.5-11.0)  K/mm3


 


RDW  16.6 H   16.6 H  (13.2-15.2)  %


 


Lymph % (Auto)    3.5 L  (13.4-35.0)  %


 


Mono % (Auto)    7.7 H  (0.0-7.3)  %


 


Lymph #    0.6 L  (1.2-5.4)  K/mm3


 


Mono #    1.4 H  (0.0-0.8)  K/mm3


 


Seg Neutrophils %    88.7 H  (40.0-70.0)  %


 


Seg Neutrophils #    16.2 H  (1.8-7.7)  K/mm3


 


Creatinine   0.5 L   (0.7-1.2)  mg/dL


 


Lactate Dehydrogenase   248 H   ()  units/L


 


Total Creatine Kinase     ()  units/L


 


CK-MB (CK-2)     (0.0-4.0)  ng/mL


 


CK-MB (CK-2) Rel Index     (0-4)  


 


Troponin T     (0.00-0.029)  ng/mL


 


NT-Pro-B Natriuret Pep     (0-450)  pg/mL


 


Triglycerides     (2-149)  mg/dL


 


Cholesterol     ()  mg/dL


 


HDL Cholesterol     (40-59)  mg/dL














  19 Range/Units





  23:41 23:41 23:41 


 


WBC     (4.5-11.0)  K/mm3


 


RDW     (13.2-15.2)  %


 


Lymph % (Auto)     (13.4-35.0)  %


 


Mono % (Auto)     (0.0-7.3)  %


 


Lymph #     (1.2-5.4)  K/mm3


 


Mono #     (0.0-0.8)  K/mm3


 


Seg Neutrophils %     (40.0-70.0)  %


 


Seg Neutrophils #     (1.8-7.7)  K/mm3


 


Creatinine     (0.7-1.2)  mg/dL


 


Lactate Dehydrogenase     ()  units/L


 


Total Creatine Kinase    155 H  ()  units/L


 


CK-MB (CK-2)    11.4 H  (0.0-4.0)  ng/mL


 


CK-MB (CK-2) Rel Index    7.3 H  (0-4)  


 


Troponin T   0.274 H*   (0.00-0.029)  ng/mL


 


NT-Pro-B Natriuret Pep  5198 H    (0-450)  pg/mL


 


Triglycerides   540 H   (2-149)  mg/dL


 


Cholesterol   291 H   ()  mg/dL


 


HDL Cholesterol   60 H   (40-59)  mg/dL














Assessment and Plan


38 year old woman, with preeclampsia, s/p  is being seen for evaluation

of pulmonary edema. She stated that shortness of breath  started today, 

orthopnea, worse with activity.  She was given a total of 30 mg IV Lasix for 

pulmonary edema seen on chest x-ray, hypoxia, also given IV fluid in the OR.  

She denies any shortness during the pregnancy until today.  Blood pressure was 

noted to be labile.  Physical exam is significant for bilateral crackles.  Rule 

out underlying cardiomyopathy, Agree with plan as stated above.  In addition, 

obtain ekg given abnormal troponin, start broad-spectrum antibiotic since white 

count is trending up, d/c cefazolin, obtain cultures. Blood pressure is labile, 

give additional lasix tomorrow based on readings. Patient seen and examined, d/w

NP

## 2019-07-06 NOTE — XRAY REPORT
CHEST 1 VIEW



INDICATION: 

SOB, decrease sats.



COMPARISON: 

One day prior.



FINDINGS:



Support devices: None.



Heart: Stable. 



Lungs/Pleura: There are now rather diffuse bilateral airspace opacities.  







IMPRESSION:

1. Interval development of diffuse bilateral airspace opacities.



Signer Name: Bryan Galvan MD 

Signed: 7/6/2019 10:43 PM

 Workstation Name: [id number]

## 2019-07-06 NOTE — EVENT NOTE
Date: 07/06/19


Spoke with RN and pt has had about 30 cc/hr over the last two hours. Will d/c 

bah at this time and con't to monitor.

## 2019-07-07 LAB
ANISOCYTOSIS BLD QL SMEAR: (no result)
APTT BLD: 20.7 SEC. (ref 24.2–36.6)
BAND NEUTROPHILS # (MANUAL): 0.6 K/MM3
BUN SERPL-MCNC: 16 MG/DL (ref 7–17)
BUN SERPL-MCNC: 21 MG/DL (ref 7–17)
BUN/CREAT SERPL: 7 %
BUN/CREAT SERPL: 7 %
CALCIUM SERPL-MCNC: 7.4 MG/DL (ref 8.4–10.2)
CALCIUM SERPL-MCNC: 7.6 MG/DL (ref 8.4–10.2)
CREATININE,URINE: 34.9 MG/DL (ref 0.1–20)
HCT VFR BLD CALC: 32 % (ref 30.3–42.9)
HCT VFR BLD CALC: 37.4 % (ref 30.3–42.9)
HEMOLYSIS INDEX: 0
HEMOLYSIS INDEX: 54
HGB BLD-MCNC: 11.1 GM/DL (ref 10.1–14.3)
HGB BLD-MCNC: 12.1 GM/DL (ref 10.1–14.3)
INR PPP: 0.95 (ref 0.87–1.13)
MACROCYTES BLD QL SMEAR: (no result)
MCHC RBC AUTO-ENTMCNC: 32 % (ref 30–34)
MCHC RBC AUTO-ENTMCNC: 35 % (ref 30–34)
MCV RBC AUTO: 92 FL (ref 79–97)
MCV RBC AUTO: 96 FL (ref 79–97)
MYELOCYTES # (MANUAL): 0.4 K/MM3
PLATELET # BLD: 129 K/MM3 (ref 140–440)
PLATELET # BLD: 173 K/MM3 (ref 140–440)
PROMYELOCYTES # (MANUAL): 0 K/MM3
RBC # BLD AUTO: 3.47 M/MM3 (ref 3.65–5.03)
RBC # BLD AUTO: 3.91 M/MM3 (ref 3.65–5.03)
TOTAL CELLS COUNTED BLD: 100
URATE SERPL-MCNC: 6.4 MG/DL (ref 3.5–7.6)

## 2019-07-07 PROCEDURE — 3E0234Z INTRODUCTION OF SERUM, TOXOID AND VACCINE INTO MUSCLE, PERCUTANEOUS APPROACH: ICD-10-PCS | Performed by: OBSTETRICS & GYNECOLOGY

## 2019-07-07 RX ADMIN — PIPERACILLIN AND TAZOBACTAM SCH MLS/HR: 4; .5 INJECTION, POWDER, FOR SOLUTION INTRAVENOUS at 01:56

## 2019-07-07 RX ADMIN — ALBUTEROL SULFATE SCH: 2.5 SOLUTION RESPIRATORY (INHALATION) at 16:00

## 2019-07-07 RX ADMIN — Medication SCH ML: at 09:54

## 2019-07-07 RX ADMIN — FENTANYL CITRATE SCH MLS/HR: 50 INJECTION, SOLUTION INTRAMUSCULAR; INTRAVENOUS at 12:44

## 2019-07-07 RX ADMIN — FENTANYL CITRATE SCH MLS/HR: 50 INJECTION, SOLUTION INTRAMUSCULAR; INTRAVENOUS at 06:21

## 2019-07-07 RX ADMIN — FENTANYL CITRATE SCH MLS/HR: 50 INJECTION, SOLUTION INTRAMUSCULAR; INTRAVENOUS at 19:07

## 2019-07-07 RX ADMIN — ALBUTEROL SULFATE SCH MG: 2.5 SOLUTION RESPIRATORY (INHALATION) at 12:22

## 2019-07-07 RX ADMIN — FENTANYL CITRATE SCH MLS/HR: 50 INJECTION, SOLUTION INTRAMUSCULAR; INTRAVENOUS at 00:07

## 2019-07-07 RX ADMIN — FAMOTIDINE SCH MG: 10 INJECTION, SOLUTION INTRAVENOUS at 09:54

## 2019-07-07 RX ADMIN — CEFEPIME SCH MLS/HR: 2 INJECTION, POWDER, FOR SOLUTION INTRAVENOUS at 15:17

## 2019-07-07 NOTE — ULTRASOUND REPORT
ULTRASOUND RENAL



INDICATION / CLINICAL INFORMATION:

acute renal failure.



COMPARISON:

None available.



FINDINGS:

RIGHT KIDNEY: Length = 12.8 cm.    [normal > 9 cm] 

- Parenchymal Thickness = 1.5 cm.  [normal > 1.5 cm] 

- Echogenicity: There is increased renal cortical echogenicity.

- Hydronephrosis: None.

- Cyst or mass: No significant abnormality.  

- Stones: None seen. 



LEFT KIDNEY: Length = 13.3 cm.       [normal > 9 cm] 

- Parenchymal Thickness = 1.9 cm.  [normal > 1.5 cm] 

- Echogenicity: There is slightly increased renal cortical echogenicity.

- Hydronephrosis: None.

- Cyst or mass: No significant abnormality.  

- Stones: None seen. 



URINARY BLADDER: Collapsed.

FREE FLUID: None.



ADDITIONAL FINDINGS: None.



IMPRESSION:

1. Increased renal cortical echogenicity bilaterally but overall greatest on the right. Findings are 
nonspecific but often seen with medical renal disease. Otherwise there is nothing acute.



Signer Name: Gigi Betancourt MD 

Signed: 7/7/2019 10:28 AM

 Workstation Name: [id number]

## 2019-07-07 NOTE — VASCULAR LAB REPORT
DUPLEX DOPPLER LOWER EXTREMITY VEINS, BILATERAL



INDICATION:

Acute respiratory failure. Shortness of breath and low O2 sats.



TECHNIQUE:

Duplex doppler imaging was performed through the veins of both lower extremities using venous darlin
abigail and other maneuvers.



COMPARISON: 

None available.



FINDINGS:

Right Common femoral vein: Negative.

Right Superficial femoral vein: Negative.

Right Popliteal vein: Negative.

Right Calf veins: Negative.



Left Common femoral vein: Negative.

Left Superficial femoral vein: Negative.

Left Popliteal vein: Negative.

Left Calf veins: Negative.



Additional findings: There is no evidence of a popliteal cyst or other abnormality.



IMPRESSION: No sonographic evidence for DVT in either lower extremity.



Signer Name: Juan De La Cruz MD 

Signed: 7/7/2019 1:36 PM

 Workstation Name: [id number]

## 2019-07-07 NOTE — PROGRESS NOTE
Assessment and Plan





- Patient Problems


(1) 38 weeks gestation of pregnancy


Current Visit: Yes   Status: Ruled-out   





(2) Prolonged latent phase of labor


Current Visit: Yes   Status: Ruled-out   





(3) Oligohydramnios antepartum


Current Visit: Yes   Status: Ruled-out   





(4) Positive GBS test


Current Visit: Yes   Status: Acute   





(5) SROM (spontaneous rupture of membranes)


Current Visit: Yes   Status: Ruled-out   





(6) S/P primary low transverse 


Current Visit: Yes   Status: Acute   


Plan to address problem: 


-stable for surgical standpoint in that incision appears to be healing well w/o 

any s/x of infection and uterus is firm and below the umbilcus.








(7) Gestational hypertension


Current Visit: Yes   Status: Acute   


Qualifiers: 


   Trimester: third trimester   Qualified Code(s): O13.3 - Gestational [pregnanc

y-induced] hypertension without significant proteinuria, third trimester   


Plan to address problem: 


-bp is stable at this time on no meds


-con't to monitor closely








(8) Pulmonary edema


Current Visit: Yes   Status: Acute   


Plan to address problem: 


cont' current management as per pulmonary team. CXR is unchanged 











(9) Acute renal failure


Current Visit: Yes   Status: Acute   


Plan to address problem: 


-pt being followed by nephrology








(10) Acute respiratory failure


Current Visit: Yes   Status: Acute   





Subjective





- Subjective


Date of service: 19


Principal diagnosis: 1) POD #2 S/P 1LTCS 2)LOW UOP 3) gestatinal hypertension 4)

pulmonary edema


Interval history: 


Pt still intbated but does not appear to be in pain or distress at this time. 

Dressing removed. Incision open to air and c/d/i. Pt  at bedside. He had 

questions regarding the delivery as well as indications for the c/s. All of 

which were addressed and answered. States she has no other question regarding pt

status at this time as other providers have spoken with him prior to my visit.





Objective





- Vital Signs


Latest vital signs: 


                                   Vital Signs











  Temp Pulse Pulse Pulse Resp Resp BP


 


 19 12:23    98 H    25 H 


 


 19 12:19   96 H      117/78


 


 19 09:23   100 H      103/73


 


 19 09:00   104 H    22   103/73


 


 19 08:00   105 H    26 H   114/75


 


 19 07:46   113 H      114/75


 


 19 07:42   109 H      114/70


 


 19 07:00   117 H    31 H   121/75


 


 19 06:00   137 H    31 H   155/94


 


 19 05:32   99 H      108/64


 


 19 05:00   98 H    25 H   103/69


 


 19 04:00   112 H   100 H  25 H   102/68


 


 19 03:30  98.4 F      


 


 19 03:00   99 H    25 H   83/54


 


 19 02:00   112 H    18   101/67


 


 19 01:00   113 H    20   102/63


 


 19 00:13   132 H    25 H   135/84


 


 19 00:00   131 H    20   135/84


 


 19 23:51   130 H    35 H  


 


 19 23:37  97.9 F      


 


 19 23:08   153 H      150/105


 


 19 22:01   140 H    15   165/102


 


 19 21:00   106 H    27 H   125/68


 


 19 20:00  98.4 F  96 H    14   117/68


 


 19 19:34  98.4 F      


 


 19 19:00   81    25 H   107/66


 


 19 18:01   79    22   108/82


 


 19 17:00   87    16   112/67


 


 19 16:00  98.3 F  76    15   108/66


 


 19 15:01   91 H    15   117/57


 


 19 14:00   73    11 L   113/53














  Pulse Ox


 


 19 12:23 


 


 19 12:19  98


 


 19 09:23  97


 


 19 09:00  99


 


 19 08:00  97


 


 19 07:46  97


 


 19 07:42  100


 


 19 07:00  92


 


 19 06:00  76 L


 


 19 05:32  100


 


 19 05:00 


 


 19 04:00  100


 


 19 03:30 


 


 19 03:00  100


 


 19 02:00  95


 


 19 01:00  92


 


 19 00:13  89


 


 19 00:00  88


 


 19 23:51  82 L


 


 19 23:37 


 


 19 23:08  90


 


 19 22:01  89


 


 19 21:00  84


 


 19 20:00  88


 


 19 19:34 


 


 19 19:00  95


 


 19 18:01  93


 


 19 17:00  94


 


 19 16:00  94


 


 19 15:01  96


 


 19 14:00  90








                                Intake and Output











 19





 22:59 06:59 14:59


 


Intake Total 460 157.899 97.416


 


Output Total 


 


Balance 225 -392.101 -1052.584


 


Intake:   


 


   57.899 97.416


 


    ZOSYN/NS 4.5GM/100ML 4.5 100  





    gm In 100 ml @ 200 mls/hr   





    IV Q8H AYAKA Rx#:[id number]   


 


    fentaNYL DRIP Premix 2,  57.899 97.416





    000 mcg In 100 ml @ 1 MCG   





    /KG/HR 3.856 mls/hr IV   





    TITR AYAKA Rx#:[id number]   


 


  Oral 360  0


 


  Intake, Free Water  100 


 


Output:   


 


  Urine 


 


    Indwelling Catheter 


 


Other:   


 


  Total, Intake Amount 360  0


 


  Total, Output Amount 100 250 450


 


  Voiding Method Indwelling Catheter Indwelling Catheter 














- Exam


Abdomen: Present: normal appearance, soft.  Absent: distention, tenderness


Uterus: Present: normal, firm, fundal height below umbilicus.  Absent: 

tenderness


Extremities: Present: normal


Incision: Present: normal, dry, intact, other (open to air with old dry blood on

steristrips; no s/sx of infection)





- Labs


Labs: 


                              Abnormal lab results











  19 Range/Units





  22:35 22:35 22:35 


 


WBC  20.8 H    (4.5-11.0)  K/mm3


 


RBC     (3.65-5.03)  M/mm3


 


MCHC     (30-34)  %


 


RDW  17.2 H    (13.2-15.2)  %


 


Plt Count     (140-440)  K/mm3


 


Seg Neuts % (Manual)  79.0 H    (40.0-70.0)  %


 


Lymphocytes % (Manual)  12.0 L    (13.4-35.0)  %


 


Seg Neutrophils # Man  16.4 H    (1.8-7.7)  K/mm3


 


APTT   20.7 L   (24.2-36.6)  Sec.


 


POC ABG pH     (7.35-7.45)  


 


POC ABG pCO2     (35-45)  


 


POC ABG pO2     ()  


 


Sodium     (137-145)  mmol/L


 


Chloride     ()  mmol/L


 


Carbon Dioxide     (22-30)  mmol/L


 


Creatinine     (0.7-1.2)  mg/dL


 


Glucose     ()  mg/dL


 


POC Glucose     ()  


 


Calcium     (8.4-10.2)  mg/dL


 


Total Creatine Kinase    267 H  ()  units/L


 


CK-MB (CK-2)    9.7 H  (0.0-4.0)  ng/mL


 


Troponin T    0.313 H* D  (0.00-0.029)  ng/mL














  19 Range/Units





  22:44 00:03 01:24 


 


WBC     (4.5-11.0)  K/mm3


 


RBC     (3.65-5.03)  M/mm3


 


MCHC     (30-34)  %


 


RDW     (13.2-15.2)  %


 


Plt Count     (140-440)  K/mm3


 


Seg Neuts % (Manual)     (40.0-70.0)  %


 


Lymphocytes % (Manual)     (13.4-35.0)  %


 


Seg Neutrophils # Man     (1.8-7.7)  K/mm3


 


APTT     (24.2-36.6)  Sec.


 


POC ABG pH  7.046 L   7.246 L  (7.35-7.45)  


 


POC ABG pCO2    47.0 H  (35-45)  


 


POC ABG pO2  62 L   72 L  ()  


 


Sodium     (137-145)  mmol/L


 


Chloride     ()  mmol/L


 


Carbon Dioxide     (22-30)  mmol/L


 


Creatinine     (0.7-1.2)  mg/dL


 


Glucose     ()  mg/dL


 


POC Glucose   152 H   ()  


 


Calcium     (8.4-10.2)  mg/dL


 


Total Creatine Kinase     ()  units/L


 


CK-MB (CK-2)     (0.0-4.0)  ng/mL


 


Troponin T     (0.00-0.029)  ng/mL














  19 Range/Units





  04:28 05:33 05:53 


 


WBC     (4.5-11.0)  K/mm3


 


RBC     (3.65-5.03)  M/mm3


 


MCHC     (30-34)  %


 


RDW     (13.2-15.2)  %


 


Plt Count     (140-440)  K/mm3


 


Seg Neuts % (Manual)     (40.0-70.0)  %


 


Lymphocytes % (Manual)     (13.4-35.0)  %


 


Seg Neutrophils # Man     (1.8-7.7)  K/mm3


 


APTT     (24.2-36.6)  Sec.


 


POC ABG pH    7.328 L  (7.35-7.45)  


 


POC ABG pCO2     (35-45)  


 


POC ABG pO2    256 H  ()  


 


Sodium     (137-145)  mmol/L


 


Chloride     ()  mmol/L


 


Carbon Dioxide     (22-30)  mmol/L


 


Creatinine     (0.7-1.2)  mg/dL


 


Glucose     ()  mg/dL


 


POC Glucose   153 H   ()  


 


Calcium     (8.4-10.2)  mg/dL


 


Total Creatine Kinase     ()  units/L


 


CK-MB (CK-2)     (0.0-4.0)  ng/mL


 


Troponin T  0.740 H* D    (0.00-0.029)  ng/mL














  19 Range/Units





  10:38 10:38 12:43 


 


WBC    16.1 H  (4.5-11.0)  K/mm3


 


RBC    3.47 L  (3.65-5.03)  M/mm3


 


MCHC    35 H  (30-34)  %


 


RDW    17.1 H  (13.2-15.2)  %


 


Plt Count    129 L  (140-440)  K/mm3


 


Seg Neuts % (Manual)     (40.0-70.0)  %


 


Lymphocytes % (Manual)     (13.4-35.0)  %


 


Seg Neutrophils # Man     (1.8-7.7)  K/mm3


 


APTT     (24.2-36.6)  Sec.


 


POC ABG pH     (7.35-7.45)  


 


POC ABG pCO2     (35-45)  


 


POC ABG pO2     ()  


 


Sodium     (137-145)  mmol/L


 


Chloride     ()  mmol/L


 


Carbon Dioxide     (22-30)  mmol/L


 


Creatinine     (0.7-1.2)  mg/dL


 


Glucose     ()  mg/dL


 


POC Glucose     ()  


 


Calcium     (8.4-10.2)  mg/dL


 


Total Creatine Kinase   431 H   ()  units/L


 


CK-MB (CK-2)     (0.0-4.0)  ng/mL


 


Troponin T  0.473 H* D    (0.00-0.029)  ng/mL














  19 Range/Units





  Unknown 


 


WBC   (4.5-11.0)  K/mm3


 


RBC   (3.65-5.03)  M/mm3


 


MCHC   (30-34)  %


 


RDW   (13.2-15.2)  %


 


Plt Count   (140-440)  K/mm3


 


Seg Neuts % (Manual)   (40.0-70.0)  %


 


Lymphocytes % (Manual)   (13.4-35.0)  %


 


Seg Neutrophils # Man   (1.8-7.7)  K/mm3


 


APTT   (24.2-36.6)  Sec.


 


POC ABG pH   (7.35-7.45)  


 


POC ABG pCO2   (35-45)  


 


POC ABG pO2   ()  


 


Sodium  134 L D  (137-145)  mmol/L


 


Chloride  97.9 L  ()  mmol/L


 


Carbon Dioxide  18 L  (22-30)  mmol/L


 


Creatinine  2.3 H D  (0.7-1.2)  mg/dL


 


Glucose  225 H  ()  mg/dL


 


POC Glucose   ()  


 


Calcium  7.4 L  (8.4-10.2)  mg/dL


 


Total Creatine Kinase   ()  units/L


 


CK-MB (CK-2)   (0.0-4.0)  ng/mL


 


Troponin T   (0.00-0.029)  ng/mL

## 2019-07-07 NOTE — PROGRESS NOTE
Assessment and Plan


Assessment and plan: 





38-year-old  female who is s/p  on 19.  Patient was 

transferred to [facility] for management of pulmonary edema.  Chest x-ray showed mild 

cardiomegaly and pulmonary edema. She was given lasix. Last night worse 

respiratory distress, rapid response team called and she was intubated





Acute resp failure s/p intubated , placed on vent 


ARDS


Pulmonary edema, noncardiogenic


Labile blood pressure


Sinus tachycardia


s/p  on 19


GBS positive


History of herpes simplex 2


History of preeclampsia








Plan:


Continue supportive care


Intubated last night


Routine pulm toileting


Cardiology following


Nephrology consult for NAZARIO


Pulmonology following


DVT PPX on Lovenox











History


Interval history: 


patient became worse last night, respiratory distress, Code MET called, 

intubated put on vent








Hospitalist Physical





- Physical exam


Narrative exam: 


Gen: Not in acute distress, lying in bed, intubated,sedated 


HEENT: Normocephalic, atraumatic


Neck: supple, no JVD


Heart: S1 and S2 reg, no murmurs, rubs or gallop


Lungs: Bilateral crackles, no wheeze


Abd: soft, non tender, non distended, normal BS


Ext: No edema, no clubbing, no cyanosis, 


Neuro: Intubated, sedated








- Constitutional


Vitals: 


                                        











Temp Pulse Resp BP Pulse Ox


 


 98.4 F   113 H  31 H  114/75   97 


 


 19 03:30  19 07:46  19 07:00  19 07:46  19 07:46











General appearance: Present: no acute distress, well-nourished





Results





- Labs


CBC & Chem 7: 


                                 19 03:44





                                 19 03:44


Labs: 


                             Laboratory Last Values











WBC  20.8 K/mm3 (4.5-11.0)  H  19  22:35    


 


RBC  3.91 M/mm3 (3.65-5.03)   19  22:35    


 


Hgb  12.1 gm/dl (10.1-14.3)   19  22:35    


 


Hct  37.4 % (30.3-42.9)   19  22:35    


 


MCV  96 fl (79-97)   19  22:35    


 


MCH  31 pg (28-32)   19  22:35    


 


MCHC  32 % (30-34)   19  22:35    


 


RDW  17.2 % (13.2-15.2)  H  19  22:35    


 


Plt Count  173 K/mm3 (140-440)   19  22:35    


 


Lymph % (Auto)  3.5 % (13.4-35.0)  L  19  23:41    


 


Mono % (Auto)  7.7 % (0.0-7.3)  H  19  23:41    


 


Eos % (Auto)  0.0 % (0.0-4.3)   19  23:41    


 


Baso % (Auto)  0.1 % (0.0-1.8)   19  23:41    


 


Lymph #  0.6 K/mm3 (1.2-5.4)  L  19  23:41    


 


Mono #  1.4 K/mm3 (0.0-0.8)  H  19  23:41    


 


Eos #  0.0 K/mm3 (0.0-0.4)   19  23:41    


 


Baso #  0.0 K/mm3 (0.0-0.1)   19  23:41    


 


Add Manual Diff  Complete   19  22:35    


 


Total Counted  100   19  22:35    


 


Seg Neutrophils %  88.7 % (40.0-70.0)  H  19  23:41    


 


Seg Neuts % (Manual)  79.0 % (40.0-70.0)  H  19  22:35    


 


  3.0 %  19  22:35    


 


  12.0 % (13.4-35.0)  L  19  22:35    


 


Reactive Lymphs % (Man)  0 %  19  22:35    


 


  2.0 % (0.0-7.3)   19  22:35    


 


  0 % (0.0-4.3)   19  22:35    


 


  0 % (0.0-1.8)   19  22:35    


 


  2.0 %  19  22:35    


 


  2.0 %  19  22:35    


 


  0 %  07/06/19  22:35    


 


  0 %  19  22:35    


 


Nucleated RBC %  Not Reportable   19  22:35    


 


Seg Neutrophils #  16.2 K/mm3 (1.8-7.7)  H  19  23:41    


 


Seg Neutrophils # Man  16.4 K/mm3 (1.8-7.7)  H  19  22:35    


 


Band Neutrophils #  0.6 K/mm3  19  22:35    


 


  2.5 K/mm3 (1.2-5.4)   19  22:35    


 


Abs React Lymphs (Man)  0.0 K/mm3  19  22:35    


 


  0.4 K/mm3 (0.0-0.8)   19  22:35    


 


  0.0 K/mm3 (0.0-0.4)   19  22:35    


 


  0.0 K/mm3 (0.0-0.1)   19  22:35    


 


  0.4 K/mm3  19  22:35    


 


  0.4 K/mm3  19  22:35    


 


  0.0 K/mm3  19  22:35    


 


Blast Cells #  0.0 K/mm3  19  22:35    


 


WBC Morphology  Not Reportable   19  22:35    


 


Hypersegmented Neuts  Not Reportable   19  22:35    


 


Hyposegmented Neuts  Not Reportable   19  22:35    


 


Hypogranular Neuts  Not Reportable   19  22:35    


 


  Not Reportable   19  22:35    


 


  Not Reportable   19  22:35    


 


  Not Reportable   19  22:35    


 


  Not Reportable   19  22:35    


 


  Not Reportable   19  22:35    


 


  Not Reportable   19  22:35    


 


  Consistent w auto   19  22:35    


 


  Not Reportable   19  22:35    


 


Plt Clumps, EDTA  Not Reportable   19  22:35    


 


  Not Reportable   19  22:35    


 


  Not Reportable   19  22:35    


 


  Not Reportable   19  22:35    


 


Plt Morphology Comment  Not Reportable   19  22:35    


 


RBC Morphology  Not Reportable   19  22:35    


 


Dimorphic RBCs  Not Reportable   19  22:35    


 


  Not Reportable   19  22:35    


 


  Not Reportable   19  22:35    


 


  Not Reportable   19  22:35    


 


  1+   19  22:35    


 


  Not Reportable   19  22:35    


 


  1+   19  22:35    


 


  Not Reportable   19  22:35    


 


  Not Reportable   19  22:35    


 


  Not Reportable   19  22:35    


 


  Not Reportable   19  22:35    


 


  Not Reportable   19  22:35    


 


  Not Reportable   19  22:35    


 


  Not Reportable   19  22:35    


 


  Not Reportable   19  22:35    


 


  Not Reportable   19  22:35    


 


  Not Reportable   19  22:35    


 


  Not Reportable   19  22:35    


 


  Not Reportable   19  22:35    


 


  Not Reportable   19  22:35    


 


Acanthocytes (Spur)  Not Reportable   19  22:35    


 


Rouleaux  Not Reportable   19  22:35    


 


  Not Reportable   19  22:35    


 


  Not Reportable   19  22:35    


 


  Not Reportable   19  22:35    


 


  Not Reportable   19  22:35    


 


Hem Pathologist Commnt  No   19  22:35    


 


PT  12.4 Sec. (12.2-14.9)   19  22:35    


 


INR  0.95  (0.87-1.13)   19  22:35    


 


APTT  20.7 Sec. (24.2-36.6)  L  19  22:35    


 


POC ABG pH  7.328  (7.35-7.45)  L  19  05:53    


 


POC ABG pCO2  39.0  (35-45)   19  05:53    


 


POC ABG pO2  256  ()  H  19  05:53    


 


POC ABG HCO3  20.4  (22-26 mml/L)   19  05:53    


 


POC ABG Total CO2  22  (23-27mmol/L)   19  05:53    


 


POC ABG O2 Sat  100   19  05:53    


 


POC ABG Base Excess  -6  ((-2) - (+3)mmol/L)   19  05:53    


 


  100 %  19  05:53    


 


Sodium  134 mmol/L (137-145)  L D 19  Unknown


 


Potassium  3.8 mmol/L (3.6-5.0)   19  Unknown


 


Chloride  97.9 mmol/L ()  L  19  Unknown


 


Carbon Dioxide  18 mmol/L (22-30)  L  19  Unknown


 


  22 mmol/L  19  Unknown


 


BUN  16 mg/dL (7-17)   19  Unknown


 


  2.3 mg/dL (0.7-1.2)  H D 19  Unknown


 


Estimated GFR  24 ml/min  19  Unknown


 


  7 %  19  Unknown


 


Glucose  225 mg/dL ()  H  19  Unknown


 


POC Glucose  153  ()  H  19  05:33    


 


  4.7 mg/dL (3.5-7.6)   19  16:12    


 


Calcium  7.4 mg/dL (8.4-10.2)  L  19  Unknown


 


AST  33 units/L (5-40)   19  16:12    


 


ALT  24 units/L (7-56)   19  16:12    


 


  248 units/L ()  H  19  16:12    


 


  267 units/L ()  H  19  22:35    


 


CK-MB (CK-2)  9.7 ng/mL (0.0-4.0)  H  19  22:35    


 


CK-MB (CK-2) Rel Index  3.6  (0-4)   19  22:35    


 


  0.740 ng/mL (0.00-0.029)  H* D 19  04:28    


 


NT-Pro-B Natriuret Pep  5198 pg/mL (0-450)  H  19  23:41    


 


Triglycerides  540 mg/dL (2-149)  H  19  23:41    


 


Cholesterol  291 mg/dL ()  H  19  23:41    


 


  TNR   19  23:41    


 


  60 mg/dL (40-59)  H  19  23:41    


 


  4.85 %  19  23:41    


 


  Marie  (Yellow)   19  08:35    


 


  Turbid  (Clear)   19  08:35    


 


  5.0  (5.0-7.0)   19  08:35    


 


Ur Specific Gravity  1.023  (1.003-1.030)   19  08:35    


 


  100 mg/dl mg/dL (Negative)   19  08:35    


 


  >=500 mg/dL (Negative)   19  08:35    


 


  20 mg/dL (Negative)   19  08:35    


 


  Lg  (Negative)   19  08:35    


 


  Neg  (Negative)   19  08:35    


 


  Neg  (Negative)   19  08:35    


 


  < 2.0 mg/dL (<2.0)   19  08:35    


 


Ur Leukocyte Esterase  Sm  (Negative)   19  08:35    


 


  37.0 /HPF (0.0-6.0)  H  19  08:35    


 


  59.0 /HPF (0.0-6.0)   19  08:35    


 


U Epithel Cells (Auto)  1.0 /HPF (0-13.0)   19  08:35    


 


Uric Acid Crystals  Few   19  08:35    


 


RPR  Nonreactive  (Nonreactive)   19  19:45    


 


Blood Type  O NEGATIVE   19  23:41    


 


Antibody Screen  Negative   19  23:41    


 


Fetal Screen  Negative   19  23:41    














Active Medications





- Current Medications


Current Medications: 














Generic Name Dose Route Start Last Admin





  Trade Name Freq  PRN Reason Stop Dose Admin


 


Al Hydrox/Mg Hydrox/Simethicone  30 ml  19 23:16 





  Alum-Mag Hydrox-Simeth 120-459-61sm/5ml  PO  





  Q4H PRN  





  Indigestion  


 


Albuterol  2.5 mg  19 08:00 





  Proventil  IH  





  Q8HRT AYAKA  


 


Bisacodyl  10 mg  19 23:16 





  Dulcolax  NH  





  QDAY PRN  





  constipation unrelieved by MOM  


 


Famotidine  20 mg  19 10:00  19 23:50





  Pepcid  IV   20 mg





  BID AYAKA   Administration


 


Fentanyl  50 mcg  19 23:25 





  Sublimaze  IV  





  Q10MIN PRN  





  ANALGESIA  


 


Hydrophilic Ointment  1 applic  19 22:12 





  Vaseline Lip Therapy  TP  





  Q2HR PRN  





  Dry Lips  


 


Oxytocin/Sodium Chloride  20 units in 1,000 mls @ 250 mls/hr  19 23:16 





  Pitocin/Ns 20 Unit/1000ml Drip  IV  





  AS DIRECT AYAKA  


 


Fentanyl Citrate  2,000 mcg in 100 mls @ 3.856 mls/hr  19 23:45  19 

06:25





  Fentanyl Drip Premix  IV   4 mcg/kg/hr





  TITR AYAKA   15.422 mls/hr





    Titration





  Protocol  





  1 MCG/KG/HR  


 


Midazolam HCl 100 mg/ Sodium  100 mls @ 2 mls/hr  19 23:45 





  Chloride  IV  





  TITR AYAKA  





  Protocol  





  2 MG/HR  


 


Piperacillin Sod/Tazobactam Sod  2.25 gm in 50 mls @ 100 mls/hr  19 10:00 





  Zosyn/Ns 2.25 Gm/50ml  IV  





  Q8HR [facility]  





  Protocol  


 


Magnesium Hydroxide  30 ml  19 23:16 





  Milk Of Magnesia  PO  





  Q4H PRN  





  Constipation  


 


Methylprednisolone Sodium Succinate  80 mg  19 09:00 





  Solu-Medrol  IV  19 10:00 





  Q8H [facility]  


 


Metoclopramide HCl  10 mg  19 23:16 





  Reglan  IV  





  Q6H PRN  





  Nausea And Vomiting  


 


Midazolam HCl  2 mg  19 23:25 





  Versed  IV  





  Q10MIN PRN  





  Sedation  


 


Morphine Sulfate  2 mg  19 23:19 





  Morphine  IV  





  Q4H PRN  





  Pain, Moderate (4-6)  


 


Multi-Ingred Cream/Lotion/Oil/Oint  1 applic  19 22:12 





  Artificial Tears Ophth Oint  OU  





  Q4HR PRN  





  Dry Eye(s)  


 


Multi-Ingredient Ointment  1 applic  19 23:16 





  Lansinoh  TP  





  PRN PRN  





  dryness/cracking  


 


Naloxone HCl  0.1 mg  19 23:16 





  Narcan 0.4 Mg/1 Ml  IV  





  Q2MIN PRN  





  Res Rate </= 8 or 02 SAT < 92%  


 


Sodium Chloride  10 ml  19 23:16  19 23:47





  Sodium Chloride Flush Syringe 10 Ml  IV   10 ml





  BID AYAKA   Administration


 


Sodium Chloride  10 ml  19 23:16 





  Sodium Chloride Flush Syringe 10 Ml  IV  





  PRN PRN  





  LINE FLUSH  


 


Witch Hazel/Glycerin  1 each  19 23:16 





  Tucks Pad  TP  





  PRN PRN  





  Hemorrhoids/cleansing/soothing

## 2019-07-07 NOTE — PROGRESS NOTE
Assessment and Plan





- Patient Problems


(1) Acute renal failure


Current Visit: Yes   Status: Acute   





(2) Elevated troponin


Current Visit: Yes   Status: Acute   





(3) Electrolyte abnormality


Current Visit: Yes   Status: Acute   





(4) Acute respiratory failure


Current Visit: Yes   Status: Acute   





(5) Acute respiratory failure with hypercapnia


Current Visit: Yes   Status: Acute   





(6) S/P 


Current Visit: Yes   Status: Acute   





(7) Pulmonary edema


Current Visit: Yes   Status: Acute   





Subjective


Principal diagnosis: 1) POD #1 S/P 1LTCS 2)LOW UOP 3) gestatinal hypertension 4)

pulmonary edema


Interval history: 





 at bedside


overnight ABG noted.


Now down to 60% P 10 450 tv rate 25


on fentanyl drip





Objective


                               Vital Signs - 12hr











  19





  21:00 22:01 23:08


 


Temperature   


 


Pulse Rate 106 H 140 H 153 H


 


Pulse Rate [   





From Monitor]   


 


Respiratory 27 H 15 





Rate   


 


Blood Pressure 125/68 165/102 150/105


 


O2 Sat by Pulse 84 89 90





Oximetry   














  19





  23:37 23:51 00:00


 


Temperature 97.9 F  


 


Pulse Rate  130 H 131 H


 


Pulse Rate [   





From Monitor]   


 


Respiratory  35 H 20





Rate   


 


Blood Pressure   135/84


 


O2 Sat by Pulse  82 L 88





Oximetry   














  19





  00:13 01:00 02:00


 


Temperature   


 


Pulse Rate 132 H 113 H 112 H


 


Pulse Rate [   





From Monitor]   


 


Respiratory 25 H 20 18





Rate   


 


Blood Pressure 135/84 102/63 101/67


 


O2 Sat by Pulse 89 92 95





Oximetry   














  19





  03:00 03:30 04:00


 


Temperature  98.4 F 


 


Pulse Rate 99 H  112 H


 


Pulse Rate [   100 H





From Monitor]   


 


Respiratory 25 H  25 H





Rate   


 


Blood Pressure 83/54  102/68


 


O2 Sat by Pulse 100  100





Oximetry   














  19





  05:00 05:32 06:00


 


Temperature   


 


Pulse Rate 98 H 99 H 137 H


 


Pulse Rate [   





From Monitor]   


 


Respiratory 25 H  31 H





Rate   


 


Blood Pressure 103/69 108/64 155/94


 


O2 Sat by Pulse  100 76 L





Oximetry   














  19





  07:00 07:42 07:46


 


Temperature   


 


Pulse Rate 117 H 109 H 113 H


 


Pulse Rate [   





From Monitor]   


 


Respiratory 31 H  





Rate   


 


Blood Pressure 121/75 114/70 114/75


 


O2 Sat by Pulse 92 100 97





Oximetry   











Constitutional: no acute distress


Eyes: non-icteric


ENT: oropharynx moist


Neck: supple, other (intubated on vent critically ill)


Effort: normal


Ascultation: Bilateral: diminished breath sounds, rhonchi


Cardiovascular: regular rate and rhythm


Gastrointestinal: hypoactive bowel sounds


Extremities: no cyanosis


Neurologic: other


CBC and BMP: 


                                 19 22:35





                                19 Unknown


ABG, PT/INR, D-dimer: 


ABG











POC ABG pH  7.328  (7.35-7.45)  L  19  05:53    


 


POC ABG pCO2  39.0  (35-45)   19  05:53    


 


POC ABG pO2  256  ()  H  19  05:53    


 


POC ABG HCO3  20.4  (22-26 mml/L)   19  05:53    


 


POC ABG Total CO2  22  (23-27mmol/L)   19  05:53    


 


POC ABG O2 Sat  100   19  05:53    





PT/INR, D-dimer











PT  12.4 Sec. (12.2-14.9)   19  22:35    


 


INR  0.95  (0.87-1.13)   19  22:35    








Abnormal lab findings: 


                                  Abnormal Labs











  19





  19:45 16:12 16:12


 


WBC  12.3 H  16.3 H 


 


RDW  16.9 H  16.6 H 


 


Lymph % (Auto)   


 


Mono % (Auto)   


 


Lymph #   


 


Mono #   


 


Seg Neutrophils %   


 


Seg Neuts % (Manual)   


 


Lymphocytes % (Manual)   


 


Seg Neutrophils #   


 


Seg Neutrophils # Man   


 


APTT   


 


POC ABG pH   


 


POC ABG pCO2   


 


POC ABG pO2   


 


Sodium   


 


Chloride   


 


Carbon Dioxide   


 


Creatinine    0.5 L


 


Glucose   


 


POC Glucose   


 


Calcium   


 


Lactate Dehydrogenase    248 H


 


Total Creatine Kinase   


 


CK-MB (CK-2)   


 


CK-MB (CK-2) Rel Index   


 


Troponin T   


 


NT-Pro-B Natriuret Pep   


 


Triglycerides   


 


Cholesterol   


 


HDL Cholesterol   


 


Urine WBC (Auto)   














  19





  23:41 23:41 23:41


 


WBC  18.3 H  


 


RDW  16.6 H  


 


Lymph % (Auto)  3.5 L  


 


Mono % (Auto)  7.7 H  


 


Lymph #  0.6 L  


 


Mono #  1.4 H  


 


Seg Neutrophils %  88.7 H  


 


Seg Neuts % (Manual)   


 


Lymphocytes % (Manual)   


 


Seg Neutrophils #  16.2 H  


 


Seg Neutrophils # Man   


 


APTT   


 


POC ABG pH   


 


POC ABG pCO2   


 


POC ABG pO2   


 


Sodium   


 


Chloride   


 


Carbon Dioxide   


 


Creatinine   


 


Glucose   


 


POC Glucose   


 


Calcium   


 


Lactate Dehydrogenase   


 


Total Creatine Kinase   


 


CK-MB (CK-2)   


 


CK-MB (CK-2) Rel Index   


 


Troponin T    0.274 H*


 


NT-Pro-B Natriuret Pep   5198 H 


 


Triglycerides    540 H


 


Cholesterol    291 H


 


HDL Cholesterol    60 H


 


Urine WBC (Auto)   














  19





  23:41 04:40 04:40


 


WBC   


 


RDW   


 


Lymph % (Auto)   


 


Mono % (Auto)   


 


Lymph #   


 


Mono #   


 


Seg Neutrophils %   


 


Seg Neuts % (Manual)   


 


Lymphocytes % (Manual)   


 


Seg Neutrophils #   


 


Seg Neutrophils # Man   


 


APTT   


 


POC ABG pH   


 


POC ABG pCO2   


 


POC ABG pO2   


 


Sodium   


 


Chloride    108.3 H


 


Carbon Dioxide    21 L


 


Creatinine   


 


Glucose    195 H


 


POC Glucose   


 


Calcium    7.7 L


 


Lactate Dehydrogenase   


 


Total Creatine Kinase  155 H  


 


CK-MB (CK-2)  11.4 H  


 


CK-MB (CK-2) Rel Index  7.3 H  


 


Troponin T   0.177 H* D 


 


NT-Pro-B Natriuret Pep   


 


Triglycerides   


 


Cholesterol   


 


HDL Cholesterol   


 


Urine WBC (Auto)   














  19





  08:35 09:47 22:35


 


WBC    20.8 H


 


RDW    17.2 H


 


Lymph % (Auto)   


 


Mono % (Auto)   


 


Lymph #   


 


Mono #   


 


Seg Neutrophils %   


 


Seg Neuts % (Manual)    79.0 H


 


Lymphocytes % (Manual)    12.0 L


 


Seg Neutrophils #   


 


Seg Neutrophils # Man    16.4 H


 


APTT   


 


POC ABG pH   


 


POC ABG pCO2   


 


POC ABG pO2   


 


Sodium   


 


Chloride   


 


Carbon Dioxide   


 


Creatinine   


 


Glucose   


 


POC Glucose   


 


Calcium   


 


Lactate Dehydrogenase   


 


Total Creatine Kinase   


 


CK-MB (CK-2)   


 


CK-MB (CK-2) Rel Index   


 


Troponin T   0.157 H* 


 


NT-Pro-B Natriuret Pep   


 


Triglycerides   


 


Cholesterol   


 


HDL Cholesterol   


 


Urine WBC (Auto)  37.0 H  














  19





  22:35 22:35 22:44


 


WBC   


 


RDW   


 


Lymph % (Auto)   


 


Mono % (Auto)   


 


Lymph #   


 


Mono #   


 


Seg Neutrophils %   


 


Seg Neuts % (Manual)   


 


Lymphocytes % (Manual)   


 


Seg Neutrophils #   


 


Seg Neutrophils # Man   


 


APTT  20.7 L  


 


POC ABG pH    7.046 L


 


POC ABG pCO2   


 


POC ABG pO2    62 L


 


Sodium   


 


Chloride   


 


Carbon Dioxide   


 


Creatinine   


 


Glucose   


 


POC Glucose   


 


Calcium   


 


Lactate Dehydrogenase   


 


Total Creatine Kinase   267 H 


 


CK-MB (CK-2)   9.7 H 


 


CK-MB (CK-2) Rel Index   


 


Troponin T   0.313 H* D 


 


NT-Pro-B Natriuret Pep   


 


Triglycerides   


 


Cholesterol   


 


HDL Cholesterol   


 


Urine WBC (Auto)   














  19





  00:03 01:24 04:28


 


WBC   


 


RDW   


 


Lymph % (Auto)   


 


Mono % (Auto)   


 


Lymph #   


 


Mono #   


 


Seg Neutrophils %   


 


Seg Neuts % (Manual)   


 


Lymphocytes % (Manual)   


 


Seg Neutrophils #   


 


Seg Neutrophils # Man   


 


APTT   


 


POC ABG pH   7.246 L 


 


POC ABG pCO2   47.0 H 


 


POC ABG pO2   72 L 


 


Sodium   


 


Chloride   


 


Carbon Dioxide   


 


Creatinine   


 


Glucose   


 


POC Glucose  152 H  


 


Calcium   


 


Lactate Dehydrogenase   


 


Total Creatine Kinase   


 


CK-MB (CK-2)   


 


CK-MB (CK-2) Rel Index   


 


Troponin T    0.740 H* D


 


NT-Pro-B Natriuret Pep   


 


Triglycerides   


 


Cholesterol   


 


HDL Cholesterol   


 


Urine WBC (Auto)   














  19





  05:33 05:53 Unknown


 


WBC   


 


RDW   


 


Lymph % (Auto)   


 


Mono % (Auto)   


 


Lymph #   


 


Mono #   


 


Seg Neutrophils %   


 


Seg Neuts % (Manual)   


 


Lymphocytes % (Manual)   


 


Seg Neutrophils #   


 


Seg Neutrophils # Man   


 


APTT   


 


POC ABG pH   7.328 L 


 


POC ABG pCO2   


 


POC ABG pO2   256 H 


 


Sodium    134 L D


 


Chloride    97.9 L


 


Carbon Dioxide    18 L


 


Creatinine    2.3 H D


 


Glucose    225 H


 


POC Glucose  153 H  


 


Calcium    7.4 L


 


Lactate Dehydrogenase   


 


Total Creatine Kinase   


 


CK-MB (CK-2)   


 


CK-MB (CK-2) Rel Index   


 


Troponin T   


 


NT-Pro-B Natriuret Pep   


 


Triglycerides   


 


Cholesterol   


 


HDL Cholesterol   


 


Urine WBC (Auto)

## 2019-07-07 NOTE — EVENT NOTE
<[address] - Last Filed: 07/07/19 00:47>


Date: 07/06/19





Pt was found to be in respiratory distress with saturation in mid 50's- low 60's

on supplemental oxygen with HR in 130's. She was placed on Bipap but was unable 

to tolerated. I went to bedside to evaluate pt. on auscultation bilateral 

crackles R>L. She coughed up pink frothy sputum. Ordered Lasix 80 mg and 

Morphine 2mg. Pt continued to desat and Code Met was called. Ultimately Pt was 

intubated for airway protection. Pt was moved to ICU. Cardiology (Dr. Marsh) 

notified. He advised to give potassium 20Meq now, and give IV Lasix 80mg and 

potassium 20Meq at 0600. Vent Setting , R20, PEEP 6, FIO2 100%. Pt 

continued to experience difficulty with oxygenation. ABG 7.046/71.3/62/19.6. Pt 

is hypercapnic with hypoxia. Dr. Glover (Intensivist) was notified, and came in 

to personally evaluate pt. Dr. Schilling (OB) was also notified and came to 

bedside to evaluate pt.   





<JENN VELÁZQUEZ - Last Filed: 07/13/19 22:27>


Patient seen and examined, d/w NP. Dr Schilling notify of patient's condition.


The high probability of a clinically significant, sudden or life threatening 

deterioration of the [CV, GI, respiratory] system(s) required my full and direct

attention, intervention and personal management. The aggregate critical care 

time was [ 45] minutes. This time is in addition to time spent performing 

reported procedures but includes the following: 





 x] Data Review and interpretation 





[x] Patient assessment and monitoring of vital signs 





[x] Documentation 





[x] Medication orders and management

## 2019-07-07 NOTE — XRAY REPORT
CHEST 1 VIEW



INDICATION: 

follow up respiratory failure.



COMPARISON: 

One day prior.



FINDINGS:



Support devices: There has been interval placement of a nasogastric tube in satisfactory position. En
dotracheal tube is in satisfactory position.



Heart: Stable. 



Lungs/Pleura: Extensive bilateral pulmonary opacities have worsened. No pneumothorax.  







IMPRESSION:

1. Worsening bilateral pulmonary opacities.



Signer Name: Bryan Galvan MD 

Signed: 7/7/2019 2:50 AM

 Workstation Name: [id number]

## 2019-07-07 NOTE — PROGRESS NOTE
Assessment and Plan





1.  Acute respiratory failure.


2.  Noncardiogenic pulmonary edema/ARDS


3.  Leukocytosis rule out sepsis


4.  Septic shock


5.  Acute kidney injury


6.  Status post  section





Plan.


Maintain negative fluid balance continue pressor agents and IV antibiotics  





Subjective


Date of service: 19


Principal diagnosis: 1) POD #1 S/P 1LTCS 2)LOW UOP 3) gestatinal hypertension 4)

pulmonary edema


Interval history: 





Events over the last 24 6 noted in the progress notes patient with progressive 

respiratory distress and had to be intubated for pending respiratory failure.  

Chest x-ray show worsening of pulmonary interstitial infiltrates.





Objective


                                   Vital Signs











  Temp Pulse Pulse Resp BP Pulse Ox


 


 19 09:23   100 H    103/73  97


 


 19 09:00   104 H   22  103/73  99


 


 19 08:00   105 H   26 H  114/75  97


 


 19 07:46   113 H    114/75  97


 


 19 07:42   109 H    114/70  100


 


 19 07:00   117 H   31 H  121/75  92


 


 19 06:00   137 H   31 H  155/94  76 L


 


 19 05:32   99 H    108/64  100


 


 19 05:00   98 H   25 H  103/69 


 


 19 04:00   112 H  100 H  25 H  102/68  100


 


 19 03:30  98.4 F     


 


 19 03:00   99 H   25 H  83/54  100


 


 19 02:00   112 H   18  101/67  95


 


 19 01:00   113 H   20  102/63  92


 


 19 00:13   132 H   25 H  135/84  89


 


 19 00:00   131 H   20  135/84  88


 


 19 23:51   130 H   35 H   82 L


 


 19 23:37  97.9 F     


 


 19 23:08   153 H    150/105  90


 


 19 22:01   140 H   15  165/102  89


 


 19 21:00   106 H   27 H  125/68  84


 


 19 20:00  98.4 F  96 H   14  117/68  88


 


 19 19:34  98.4 F     


 


 19 19:00   81   25 H  107/66  95


 


 19 18:01   79   22  108/82  93


 


 19 17:00   87   16  112/67  94


 


 19 16:00  98.3 F  76   15  108/66  94


 


 19 15:01   91 H   15  117/57  96


 


 19 14:00   73   11 L  113/53  90


 


 19 13:01   83   22  117/68  97


 


 19 12:00  97.7 F  69   11 L  120/70  92


 


 19 11:00   81   19  117/68  91














- Physical Examination


General: Other (Intubated on mechanical ventilator)


HEENT: Positive: PERRL, Mucus Membranes Moist


Neck: Positive: neck supple, trachea midline.  Negative: JVD/HJR


Cardiac: Positive: Regular Rate, S1/S2.  Negative: S3, S4


Lungs: Positive: Rales, Rhonchi.  Negative: No Wheeze, Rales, Rhonchi


Neuro: Positive: Grossly Intact


Abdomen: Positive: Soft, Active Bowel Sounds, Other (post C/S surgery).  

Negative: Tender, Distended


Skin: Positive: Clear


Incision: Cardiac Cath Site


Musculoskeletal: No Pain, Normal Range of Motion


Extremities: Present: normal.  Absent: edema





- Labs and Meds


                                 Cardiac Enzymes











  19 Range/Units





  22:35 


 


CK-MB (CK-2)  9.7 H  (0.0-4.0)  ng/mL








                                   Coagulation











  19 Range/Units





  22:35 


 


PT  12.4  (12.2-14.9)  Sec.


 


INR  0.95  (0.87-1.13)  


 


APTT  20.7 L  (24.2-36.6)  Sec.








                                       CBC











  19 Range/Units





  22:35 


 


WBC  20.8 H  (4.5-11.0)  K/mm3


 


RBC  3.91  (3.65-5.03)  M/mm3


 


Hgb  12.1  (10.1-14.3)  gm/dl


 


Hct  37.4  (30.3-42.9)  %


 


Plt Count  173  (140-440)  K/mm3








                          Comprehensive Metabolic Panel











  19 Range/Units





  Unknown 


 


Sodium  134 L D  (137-145)  mmol/L


 


Potassium  3.8  (3.6-5.0)  mmol/L


 


Chloride  97.9 L  ()  mmol/L


 


Carbon Dioxide  18 L  (22-30)  mmol/L


 


BUN  16  (7-17)  mg/dL


 


Creatinine  2.3 H D  (0.7-1.2)  mg/dL


 


Glucose  225 H  ()  mg/dL


 


Calcium  7.4 L  (8.4-10.2)  mg/dL

## 2019-07-07 NOTE — CONSULTATION
History of Present Illness





- History of Present Illness


Thank you for the consultation patient was evaluated today 


Source of information:


Current chart/unable to obtain history from the patient


History of presenting illness


Patient is a 38-year-old female who has been admitted here in the ICU due to 

acute pulmonary edema patient has had , and has also been noted to have

respiratory failure discussed with Dr. Glover things that patient may be 

developing ARDS.  Patient's creatinine was normal renal ultrasonogram has been 

ordered she is currently also being seen and followed by cardiology service


Patient is currently not receiving any nephrotoxic medications


Blood pressure had transiently dropped down to 83/54 around 3 AM


She has been noted to have labile blood pressure, has sinus tachycardia


Events of this hospitalization noted she has also been noted to be very hypoxic,




Patient's platelet count is normal





Past medical history significant for


Gestational diabetes


Generalized anxiety disorder


Herpes simplex





Current allergies: None





Home medication present medication: Reviewed





Social history: Reviewed from the chart





Family history: Reviewed from the chart 





Review of system: Positive for


Recent 


Labile hypertension


Hypoxemia, respiratory failure





Physical examination:


Vitals: Reviewed





HEENT: Normocephalic/atraumatic skull no pallor or icterus no uremic order oral 

mucosa moist


Neck: Supple without any thyromegaly noted or mass JVD


Chest: Bilateral diminished at bases, scattered wheezes crackles 


Heart: Regular rate and rhythm S1 and S2 heard no S3-S4 no pericardial rub


Abdomen: Soft nontender no organomegaly no masses no suprapubic fullness no CVA 

tenderness no renal bruit


Extremity: Dry skin, peripheral pulses palpable 


Some edema noted


Endocrine: Thyroid not enlarged


Psych: No evidence of vegetation aggression or behavioral issues noted


Hernan: Dry skin no petechial skin rashes


Back: was not examined due to  intubation 


Neurological: on  vent 





Labs and x-rays: Reviewed from this admission








Assessment and plan:


Renal failure in a patient who is 38-year-old admitted here with pneumonia, 

possible ARDS, hypoxemia, likely etiology of renal failure appears to be 

ischemic injury, patient also has had a significant increase in her troponin 

level that needs to be assessed,


There is no emergent indication renal placement therapy at this time,


Patient does have proteinuria approximately 100 mg per DL in the random specimen


Renal ultrasonogram has been ordered already


Chest x-ray obtained today shows bilateral worsening pulmonary opacities 

discussed with pulmonary, patient may be developing ARDS 


Will order labs for renal failure, patient ultrasonogram has been already order

ed


Strict intake and output monitoring, order urinary studies,


Clinically I do have high index of suspicion for acute tubular necrosis likely 

resulting from hypoxemia


Patient needs an echocardiogram


Had a detailed discussion with patient about the renal care plan all the renal 

related questions were answered, will order the necessary lab testing as well as

imaging as required during this admission.  Pertinent lab studies as well as 

imaging were also discussed with patient.  From renal standpoint prognosis 

appears to be guarded at this time.


We'll continue to follow and make recommendation from renal standpoint


Please feel free to call me at [phone] if you have any questions in regards

to this patient's care


Thank you for the consultation.











Past History


Past Medical History: other (herpes simplex 2, gestational diabetes, depression,

general anxiety).  denies: CAD, COPD, hypertension


Past Surgical History:  (x1 )


Social history: lives with family


Family history: no significant family history





Medications and Allergies


                                    Allergies











Allergy/AdvReac Type Severity Reaction Status Date / Time


 


No Known Allergies Allergy   Verified 19 19:31











                                Home Medications











 Medication  Instructions  Recorded  Confirmed  Last Taken  Type


 


traMADol [Ultram 50 MG tab] 50 mg PO Q6HR PRN #20 tablet 19  Unknown Rx











Active Meds: 


Active Medications





Al Hydrox/Mg Hydrox/Simethicone (Alum-Mag Hydrox-Simeth [phone]/5ml)  30 ml

PO Q4H PRN


   PRN Reason: Indigestion


Albuterol (Proventil)  2.5 mg IH Q8HRT AYAKA


Bisacodyl (Dulcolax)  10 mg UT QDAY PRN


   PRN Reason: constipation unrelieved by MOM


Famotidine (Pepcid)  20 mg IV BID AYAKA


   Last Admin: 19 09:54 Dose:  20 mg


   Documented by: 


Fentanyl (Sublimaze)  50 mcg IV Q10MIN PRN


   PRN Reason: ANALGESIA


Hydrophilic Ointment (Vaseline Lip Therapy)  1 applic TP Q2HR PRN


   PRN Reason: Dry Lips


Oxytocin/Sodium Chloride (Pitocin/Ns 20 Unit/1000ml Drip)  20 units in 1,000 mls

@ 250 mls/hr IV AS DIRECT AYAKA


Fentanyl Citrate (Fentanyl Drip Premix)  2,000 mcg in 100 mls @ 3.856 mls/hr IV 

TITR AYAKA; Protocol


   Last Titration: 19 06:25 Dose:  4 mcg/kg/hr, 15.422 mls/hr


   Documented by: 


Midazolam HCl 100 mg/ Sodium (Chloride)  100 mls @ 2 mls/hr IV TITR [facility]; 

Protocol


Piperacillin Sod/Tazobactam Sod (Zosyn/Ns 2.25 Gm/50ml)  2.25 gm in 50 mls @ 100

mls/hr IV Q6H [facility]


   Last Admin: 19 09:53 Dose:  100 mls/hr


   Documented by: 


Magnesium Hydroxide (Milk Of Magnesia)  30 ml PO Q4H PRN


   PRN Reason: Constipation


Methylprednisolone Sodium Succinate (Solu-Medrol)  80 mg IV Q8H [facility]


   Stop: 19 10:00


   Last Admin: 19 09:53 Dose:  80 mg


   Documented by: 


Metoclopramide HCl (Reglan)  10 mg IV Q6H PRN


   PRN Reason: Nausea And Vomiting


Midazolam HCl (Versed)  2 mg IV Q10MIN PRN


   PRN Reason: Sedation


Morphine Sulfate (Morphine)  2 mg IV Q4H PRN


   PRN Reason: Pain, Moderate (4-6)


Multi-Ingred Cream/Lotion/Oil/Oint (Artificial Tears Ophth Oint)  1 applic OU 

Q4HR PRN


   PRN Reason: Dry Eye(s)


Multi-Ingredient Ointment (Lansinoh)  1 applic TP PRN PRN


   PRN Reason: dryness/cracking


Naloxone HCl (Narcan 0.4 Mg/1 Ml)  0.1 mg IV Q2MIN PRN


   PRN Reason: Res Rate </= 8 or 02 SAT < 92%


Sodium Chloride (Sodium Chloride Flush Syringe 10 Ml)  10 ml IV BID [facility]


   Last Admin: 19 09:54 Dose:  10 ml


   Documented by: 


Sodium Chloride (Sodium Chloride Flush Syringe 10 Ml)  10 ml IV PRN PRN


   PRN Reason: LINE FLUSH


Witch Hazel/Glycerin (Tucks Pad)  1 each TP PRN PRN


   PRN Reason: Hemorrhoids/cleansing/soothing











Exam





- Vital Signs


Vital signs: 


                                   Vital Signs











Pulse BP


 


 85   177/85 


 


 19 18:02  19 18:02














Results





- Lab Results





                                 19 04:09





                                 19 04:14


                             Most recent lab results











Calcium  7.4 mg/dL (8.4-10.2)  L  19  Unknown

## 2019-07-08 LAB
ALBUMIN SERPL-MCNC: 2.5 G/DL (ref 3.9–5)
ALT SERPL-CCNC: 24 UNITS/L (ref 7–56)
APTT BLD: 21.7 SEC. (ref 24.2–36.6)
BUN SERPL-MCNC: 28 MG/DL (ref 7–17)
BUN/CREAT SERPL: 9 %
CALCIUM SERPL-MCNC: 7.7 MG/DL (ref 8.4–10.2)
FIBRINOGEN PPP-MCNC: 586 MG/DL (ref 211–480)
HCT VFR BLD CALC: 28.5 % (ref 30.3–42.9)
HEMOLYSIS INDEX: 3
HGB BLD-MCNC: 9.8 GM/DL (ref 10.1–14.3)
INR PPP: 1.03 (ref 0.87–1.13)
MCHC RBC AUTO-ENTMCNC: 34 % (ref 30–34)
MCV RBC AUTO: 91 FL (ref 79–97)
PLATELET # BLD: 127 K/MM3 (ref 140–440)
RBC # BLD AUTO: 3.12 M/MM3 (ref 3.65–5.03)

## 2019-07-08 RX ADMIN — FAMOTIDINE SCH MG: 10 INJECTION, SOLUTION INTRAVENOUS at 09:52

## 2019-07-08 RX ADMIN — Medication SCH ML: at 09:52

## 2019-07-08 RX ADMIN — Medication SCH: at 08:08

## 2019-07-08 RX ADMIN — Medication SCH ML: at 21:45

## 2019-07-08 RX ADMIN — ALBUTEROL SULFATE SCH MG: 2.5 SOLUTION RESPIRATORY (INHALATION) at 23:29

## 2019-07-08 RX ADMIN — FAMOTIDINE SCH: 10 INJECTION, SOLUTION INTRAVENOUS at 08:08

## 2019-07-08 RX ADMIN — POTASSIUM CHLORIDE SCH MLS/HR: 10 INJECTION, SOLUTION INTRAVENOUS at 15:38

## 2019-07-08 RX ADMIN — ALBUTEROL SULFATE SCH MG: 2.5 SOLUTION RESPIRATORY (INHALATION) at 16:53

## 2019-07-08 RX ADMIN — AMPICILLIN SODIUM SCH: 1 INJECTION, POWDER, FOR SOLUTION INTRAMUSCULAR; INTRAVENOUS at 08:38

## 2019-07-08 RX ADMIN — ALBUTEROL SULFATE SCH MG: 2.5 SOLUTION RESPIRATORY (INHALATION) at 09:11

## 2019-07-08 RX ADMIN — ALBUTEROL SULFATE SCH MG: 2.5 SOLUTION RESPIRATORY (INHALATION) at 01:12

## 2019-07-08 RX ADMIN — POTASSIUM CHLORIDE SCH MLS/HR: 10 INJECTION, SOLUTION INTRAVENOUS at 16:38

## 2019-07-08 RX ADMIN — CEFEPIME SCH MLS/HR: 2 INJECTION, POWDER, FOR SOLUTION INTRAVENOUS at 03:09

## 2019-07-08 RX ADMIN — FENTANYL CITRATE SCH MLS/HR: 50 INJECTION, SOLUTION INTRAMUSCULAR; INTRAVENOUS at 12:13

## 2019-07-08 NOTE — PROGRESS NOTE
Assessment and Plan


Cultures:


Blood cultures 2019 no growth. 


Urine culture 2019 no growth.  





Assessment: 


39 y/o female with history of preeclampsia admitted on 2019 with 38 weeks 

gestation with uterine contractions. Patient underwent  on 19 due 

to failure to dilate, pulmonary edema and gestational hypertension;  after C-

section she developed acute respiratory failure/ARDS and hypotension, now 

intubated: 





1) Leukocytosis: likely reactive from labor/ and respiratory failure. I

doubt bacterial infection at this time. Initial UA was negative for UTI, repeat 

with mild pyuria. I doubt this is the reason. CXR Chest x-ray showed mild 

cardiomegaly and pulmonary edema. No consolidations. Repeat appears better. CRP:

14.6. DVT scan negative.





2) Acute respiratory failure: CXR with pulmonary edema likely from pre-

emclapsia. Doubt pneumonia. No recent vomiting, doubt aspiration.  





3) NAZARIO: renal on board, renally adjust all antibiotics.





Recommendations:


- continue IV cefepime renally adjusted for now, plan to stop tomorrow


- monitor WBC in AM








Bronwyn Burr MD, FACP


 [facility] ([facility])


C: [phone]


O: [phone]


F: [phone]








Subjective


Date of service: 19


Principal diagnosis: 1) POD #2 S/P 1LTCS 2)LOW UOP 3) gestatinal hypertension 4)

pulmonary edema


Interval history: 





No fever. Remains on the vent. Awake. Denies any complaints. Able to communic

ate. 





Objective





- Exam


Narrative Exam: 





Physical Exam: 


Constitutional: awake, intubated


Head, Ears, Nose: Normocephalic, atraumatic. External ears, nose normal


Eyes: Conjunctivae/corneas clear. No icterus. No ptosis.


Neck: Supple, no meningeal signs


Oral: intubated


Cardiovascular: S1, S2 normal. 


Respiratory: Good air entry, clear to auscultation bilaterally


GI: enlarged uterus, non tender; bowel sounds normal. No peritoneal signs. 

Surgical incision c/d/i


Musculoskeletal: No pedal edema, no cyanosis.


Skin: No rash or abscess


Hem/Lymphatic: No palpable cervical or supraclavicular nodes. No lymphangitis


Psych: no agitation


Neurological: awake, intubated, on vent





- Constitutional


Vitals: 


                                   Vital Signs











Temp Pulse Resp BP Pulse Ox


 


 99.2 F   92 H  26 H  139/87   99 


 


 19 08:08  19 11:16  19 11:16  19 11:16  19 11:16








                           Temperature -Last 24 Hours











Temperature                    99.2 F


 


Temperature                    99.2 F


 


Temperature                    98.5 F


 


Temperature                    97.4 F


 


Temperature                    97.6 F


 


Temperature                    98.3 F


 


Temperature                    99.8 F

















- Labs


CBC & Chem 7: 


                                 19 03:44





                                 19 03:44


Labs: 


                              Abnormal lab results











  19 Range/Units





  22:44 10:19 10:38 


 


WBC     (4.5-11.0)  K/mm3


 


RBC     (3.65-5.03)  M/mm3


 


Hgb     (10.1-14.3)  gm/dl


 


Hct     (30.3-42.9)  %


 


MCHC     (30-34)  %


 


RDW     (13.2-15.2)  %


 


Plt Count     (140-440)  K/mm3


 


POC ABG pCO2  > 70 H    (35-45)  


 


POC ABG pO2     ()  


 


Sodium     (137-145)  mmol/L


 


Potassium     (3.6-5.0)  mmol/L


 


Carbon Dioxide     (22-30)  mmol/L


 


BUN     (7-17)  mg/dL


 


Creatinine     (0.7-1.2)  mg/dL


 


Glucose     ()  mg/dL


 


POC Glucose     ()  


 


Calcium     (8.4-10.2)  mg/dL


 


Troponin T    0.473 H* D  (0.00-0.029)  ng/mL


 


C-Reactive Protein     (0.00-1.30)  mg/dL


 


NT-Pro-B Natriuret Pep     (0-450)  pg/mL


 


Total Protein     (6.3-8.2)  g/dL


 


Albumin     (3.9-5)  g/dL


 


Urine Creatinine   34.9 H   (0.1-20.0)  mg/dL














  19 Range/Units





  12:43 12:43 13:02 


 


WBC   16.1 H   (4.5-11.0)  K/mm3


 


RBC   3.47 L   (3.65-5.03)  M/mm3


 


Hgb     (10.1-14.3)  gm/dl


 


Hct     (30.3-42.9)  %


 


MCHC   35 H   (30-34)  %


 


RDW   17.1 H   (13.2-15.2)  %


 


Plt Count   129 L   (140-440)  K/mm3


 


POC ABG pCO2     (35-45)  


 


POC ABG pO2     ()  


 


Sodium  134 L    (137-145)  mmol/L


 


Potassium     (3.6-5.0)  mmol/L


 


Carbon Dioxide  18 L    (22-30)  mmol/L


 


BUN  21 H    (7-17)  mg/dL


 


Creatinine  3.0 H    (0.7-1.2)  mg/dL


 


Glucose  166 H    ()  mg/dL


 


POC Glucose    163 H  ()  


 


Calcium  7.6 L    (8.4-10.2)  mg/dL


 


Troponin T     (0.00-0.029)  ng/mL


 


C-Reactive Protein     (0.00-1.30)  mg/dL


 


NT-Pro-B Natriuret Pep  11055 H    (0-450)  pg/mL


 


Total Protein     (6.3-8.2)  g/dL


 


Albumin     (3.9-5)  g/dL


 


Urine Creatinine     (0.1-20.0)  mg/dL














  19 Range/Units





  14:11 17:39 23:02 


 


WBC     (4.5-11.0)  K/mm3


 


RBC     (3.65-5.03)  M/mm3


 


Hgb     (10.1-14.3)  gm/dl


 


Hct     (30.3-42.9)  %


 


MCHC     (30-34)  %


 


RDW     (13.2-15.2)  %


 


Plt Count     (140-440)  K/mm3


 


POC ABG pCO2     (35-45)  


 


POC ABG pO2     ()  


 


Sodium     (137-145)  mmol/L


 


Potassium     (3.6-5.0)  mmol/L


 


Carbon Dioxide     (22-30)  mmol/L


 


BUN     (7-17)  mg/dL


 


Creatinine     (0.7-1.2)  mg/dL


 


Glucose     ()  mg/dL


 


POC Glucose   162 H  155 H  ()  


 


Calcium     (8.4-10.2)  mg/dL


 


Troponin T     (0.00-0.029)  ng/mL


 


C-Reactive Protein  14.60 H    (0.00-1.30)  mg/dL


 


NT-Pro-B Natriuret Pep     (0-450)  pg/mL


 


Total Protein     (6.3-8.2)  g/dL


 


Albumin     (3.9-5)  g/dL


 


Urine Creatinine     (0.1-20.0)  mg/dL














  19 Range/Units





  03:44 03:44 05:07 


 


WBC  14.8 H    (4.5-11.0)  K/mm3


 


RBC  3.12 L    (3.65-5.03)  M/mm3


 


Hgb  9.8 L    (10.1-14.3)  gm/dl


 


Hct  28.5 L    (30.3-42.9)  %


 


MCHC     (30-34)  %


 


RDW  17.5 H    (13.2-15.2)  %


 


Plt Count  127 L    (140-440)  K/mm3


 


POC ABG pCO2     (35-45)  


 


POC ABG pO2    114 H  ()  


 


Sodium     (137-145)  mmol/L


 


Potassium   3.0 L   (3.6-5.0)  mmol/L


 


Carbon Dioxide   18 L   (22-30)  mmol/L


 


BUN   28 H   (7-17)  mg/dL


 


Creatinine   3.1 H   (0.7-1.2)  mg/dL


 


Glucose   139 H   ()  mg/dL


 


POC Glucose     ()  


 


Calcium   7.7 L   (8.4-10.2)  mg/dL


 


Troponin T     (0.00-0.029)  ng/mL


 


C-Reactive Protein     (0.00-1.30)  mg/dL


 


NT-Pro-B Natriuret Pep     (0-450)  pg/mL


 


Total Protein   5.3 L   (6.3-8.2)  g/dL


 


Albumin   2.5 L   (3.9-5)  g/dL


 


Urine Creatinine     (0.1-20.0)  mg/dL














  19 Range/Units





  05:37 


 


WBC   (4.5-11.0)  K/mm3


 


RBC   (3.65-5.03)  M/mm3


 


Hgb   (10.1-14.3)  gm/dl


 


Hct   (30.3-42.9)  %


 


MCHC   (30-34)  %


 


RDW   (13.2-15.2)  %


 


Plt Count   (140-440)  K/mm3


 


POC ABG pCO2   (35-45)  


 


POC ABG pO2   ()  


 


Sodium   (137-145)  mmol/L


 


Potassium   (3.6-5.0)  mmol/L


 


Carbon Dioxide   (22-30)  mmol/L


 


BUN   (7-17)  mg/dL


 


Creatinine   (0.7-1.2)  mg/dL


 


Glucose   ()  mg/dL


 


POC Glucose  151 H  ()  


 


Calcium   (8.4-10.2)  mg/dL


 


Troponin T   (0.00-0.029)  ng/mL


 


C-Reactive Protein   (0.00-1.30)  mg/dL


 


NT-Pro-B Natriuret Pep   (0-450)  pg/mL


 


Total Protein   (6.3-8.2)  g/dL


 


Albumin   (3.9-5)  g/dL


 


Urine Creatinine   (0.1-20.0)  mg/dL














- Imaging and cardiology


Chest x-ray: report reviewed, image reviewed (improving pulmonary edema.)

## 2019-07-08 NOTE — EVENT NOTE
Date: 07/08/19


Patient resting in the bed.  Urine output 600 mL.  Appreciate pulmonary 

medicine's help.  Patient to rest this evening with plans to attempt to wean off

ventilator in the morning.

## 2019-07-08 NOTE — PROGRESS NOTE
Assessment and Plan





- Patient Problems


(1) Acute renal failure


Current Visit: Yes   Status: Acute   


Qualifiers: 


   Acute renal failure type: with acute tubular necrosis   Qualified Code(s): 

N17.0 - Acute kidney failure with tubular necrosis   


Plan to address problem: 


Patient creatinine is 3.1 last recorded urine output 600 mL since midnight.  

Nephrology is following we will await their recommendations changing care will 

continue diuresis.








(2) Acute respiratory acidosis


Current Visit: Yes   Status: Acute   


Plan to address problem: 


Patient still intubated with decreasing of her FiO2.  Chest x-ray reported s

ignificant improvement in the last 24 hours.  Will await any further 

recommendations by pulmonary medicine.








(3) Acute respiratory failure


Current Visit: Yes   Status: Acute   


Qualifiers: 


   Respiratory failure complication: hypoxia   Qualified Code(s): J96.01 - Acute

respiratory failure with hypoxia   





(4) Elevated troponin


Current Visit: Yes   Status: Acute   





(5) Gestational hypertension


Current Visit: Yes   Status: Acute   


Qualifiers: 


   Trimester: third trimester   Qualified Code(s): O13.3 - Gestational 

[pregnancy-induced] hypertension without significant proteinuria, third 

trimester   


Plan to address problem: 


Systolic blood pressure is 130s to 150s range with diastolics in the 80s- 90s 

range








(6) S/P 


Current Visit: Yes   Status: Acute   





(7) S/P primary low transverse 


Current Visit: Yes   Status: Acute   


Plan to address problem: 


Postoperative day 3 will return to evaluate patient's incision








Subjective


Date of service: 19 (ICU note)


Patient Reports: Positive: other (patient is asleep and intubated)


Narrative: 





Discussed with the patient's  and mother her present condition and 

questions if this viability did discuss treatment dependent recommendations from

the pulmonologist and the nephrologist





Objective


                               Vital Signs - 12hr











  19





  01:30 01:46 02:00


 


Temperature   


 


Pulse Rate 79 82 80


 


Pulse Rate [   





Bilateral   





Throughout]   


 


Pulse Rate [   





From Monitor]   


 


Respiratory 25 H 25 H 25 H





Rate   


 


Respiratory   





Rate [Bilateral   





Throughout]   


 


Blood Pressure 108/60 108/60 105/59


 


O2 Sat by Pulse 100 99 99





Oximetry   














  19





  02:16 02:30 02:46


 


Temperature   


 


Pulse Rate 82 103 H 85


 


Pulse Rate [   





Bilateral   





Throughout]   


 


Pulse Rate [   





From Monitor]   


 


Respiratory 25 H 22 23





Rate   


 


Respiratory   





Rate [Bilateral   





Throughout]   


 


Blood Pressure 108/60 108/60 105/59


 


O2 Sat by Pulse 100 99 98





Oximetry   














  19





  03:00 03:16 03:30


 


Temperature   


 


Pulse Rate 79 77 77


 


Pulse Rate [   





Bilateral   





Throughout]   


 


Pulse Rate [   





From Monitor]   


 


Respiratory 25 H 22 24





Rate   


 


Respiratory   





Rate [Bilateral   





Throughout]   


 


Blood Pressure 101/54 101/54 101/54


 


O2 Sat by Pulse 99 99 99





Oximetry   














  19





  03:46 03:52 04:00


 


Temperature  98.5 F 


 


Pulse Rate 80  74


 


Pulse Rate [   





Bilateral   





Throughout]   


 


Pulse Rate [   





From Monitor]   


 


Respiratory 25 H  22





Rate   


 


Respiratory   





Rate [Bilateral   





Throughout]   


 


Blood Pressure 101/54  109/62


 


O2 Sat by Pulse 99  98





Oximetry   














  19





  04:16 04:30 04:46


 


Temperature   


 


Pulse Rate 73 81 81


 


Pulse Rate [   





Bilateral   





Throughout]   


 


Pulse Rate [   





From Monitor]   


 


Respiratory 22 26 H 24





Rate   


 


Respiratory   





Rate [Bilateral   





Throughout]   


 


Blood Pressure 101/54 101/54 101/54


 


O2 Sat by Pulse 98 100 100





Oximetry   














  19





  04:59 05:00 05:16


 


Temperature   


 


Pulse Rate 89 93 H 107 H


 


Pulse Rate [   





Bilateral   





Throughout]   


 


Pulse Rate [   





From Monitor]   


 


Respiratory  21 19





Rate   


 


Respiratory   





Rate [Bilateral   





Throughout]   


 


Blood Pressure 120/77 120/77 120/77


 


O2 Sat by Pulse 99 99 98





Oximetry   














  19





  05:30 05:46 06:00


 


Temperature   


 


Pulse Rate 86 91 H 103 H


 


Pulse Rate [   





Bilateral   





Throughout]   


 


Pulse Rate [   





From Monitor]   


 


Respiratory 26 H 25 H 23





Rate   


 


Respiratory   





Rate [Bilateral   





Throughout]   


 


Blood Pressure 120/77 120/77 120/77


 


O2 Sat by Pulse 99 99 97





Oximetry   














  19





  06:16 06:30 06:46


 


Temperature   


 


Pulse Rate 121 H 110 H 105 H


 


Pulse Rate [   





Bilateral   





Throughout]   


 


Pulse Rate [   





From Monitor]   


 


Respiratory 28 H 29 H 23





Rate   


 


Respiratory   





Rate [Bilateral   





Throughout]   


 


Blood Pressure 120/77 120/77 120/77


 


O2 Sat by Pulse 95 97 99





Oximetry   














  19





  07:00 07:16 07:30


 


Temperature   


 


Pulse Rate 102 H 84 77


 


Pulse Rate [   





Bilateral   





Throughout]   


 


Pulse Rate [   





From Monitor]   


 


Respiratory 25 H 25 H 25 H





Rate   


 


Respiratory   





Rate [Bilateral   





Throughout]   


 


Blood Pressure 137/80 137/80 137/80


 


O2 Sat by Pulse 98 99 97





Oximetry   














  19





  07:46 08:00 08:08


 


Temperature  99.2 F 99.2 F


 


Pulse Rate 83 96 H 


 


Pulse Rate [   





Bilateral   





Throughout]   


 


Pulse Rate [  98 H 





From Monitor]   


 


Respiratory 25 H 25 H 





Rate   


 


Respiratory   





Rate [Bilateral   





Throughout]   


 


Blood Pressure 137/80 140/91 


 


O2 Sat by Pulse 99 99 





Oximetry   














  19





  08:16 08:30 08:46


 


Temperature   


 


Pulse Rate 99 H 94 H 85


 


Pulse Rate [   





Bilateral   





Throughout]   


 


Pulse Rate [   





From Monitor]   


 


Respiratory 25 H 25 H 25 H





Rate   


 


Respiratory   





Rate [Bilateral   





Throughout]   


 


Blood Pressure 140/91 140/91 140/91


 


O2 Sat by Pulse 98 99 100





Oximetry   














  19





  08:59 09:00 09:11


 


Temperature   


 


Pulse Rate 25 L 83 


 


Pulse Rate [   83





Bilateral   





Throughout]   


 


Pulse Rate [   





From Monitor]   


 


Respiratory  25 H 





Rate   


 


Respiratory   25 H





Rate [Bilateral   





Throughout]   


 


Blood Pressure 122/74 122/74 


 


O2 Sat by Pulse 100 100 





Oximetry   














  19





  09:16 09:21 09:30


 


Temperature   


 


Pulse Rate 121 H  86


 


Pulse Rate [  105 H 





Bilateral   





Throughout]   


 


Pulse Rate [   





From Monitor]   


 


Respiratory 25 H  25 H





Rate   


 


Respiratory  25 H 





Rate [Bilateral   





Throughout]   


 


Blood Pressure 122/74  122/74


 


O2 Sat by Pulse 100  100





Oximetry   














  19





  09:46 10:00 10:16


 


Temperature   


 


Pulse Rate 107 H 116 H 99 H


 


Pulse Rate [   





Bilateral   





Throughout]   


 


Pulse Rate [   





From Monitor]   


 


Respiratory 22 16 22





Rate   


 


Respiratory   





Rate [Bilateral   





Throughout]   


 


Blood Pressure 122/74 122/74 148/99


 


O2 Sat by Pulse 99 98 98





Oximetry   














  19





  10:30 10:46 11:00


 


Temperature   


 


Pulse Rate 96 H 103 H 95 H


 


Pulse Rate [   





Bilateral   





Throughout]   


 


Pulse Rate [   





From Monitor]   


 


Respiratory 24 24 24





Rate   


 


Respiratory   





Rate [Bilateral   





Throughout]   


 


Blood Pressure 148/99 148/99 139/87


 


O2 Sat by Pulse 99 98 100





Oximetry   














  19





  11:16 11:30 11:46


 


Temperature   


 


Pulse Rate 92 H 98 H 91 H


 


Pulse Rate [   





Bilateral   





Throughout]   


 


Pulse Rate [   





From Monitor]   


 


Respiratory 26 H 24 18





Rate   


 


Respiratory   





Rate [Bilateral   





Throughout]   


 


Blood Pressure 139/87 139/87 139/87


 


O2 Sat by Pulse 99 99 100





Oximetry   














  19





  12:00 12:16 12:18


 


Temperature 99.4 F  


 


Pulse Rate 118 H 109 H 108 H


 


Pulse Rate [   





Bilateral   





Throughout]   


 


Pulse Rate [ 98 H  





From Monitor]   


 


Respiratory 23 22 





Rate   


 


Respiratory   





Rate [Bilateral   





Throughout]   


 


Blood Pressure 153/87 153/87 153/87


 


O2 Sat by Pulse 99 99 99





Oximetry   














  19





  12:30 12:46 13:00


 


Temperature   


 


Pulse Rate 105 H 103 H 104 H


 


Pulse Rate [   





Bilateral   





Throughout]   


 


Pulse Rate [   





From Monitor]   


 


Respiratory 19 24 25 H





Rate   


 


Respiratory   





Rate [Bilateral   





Throughout]   


 


Blood Pressure 139/87 139/87 153/87


 


O2 Sat by Pulse 99 99 99





Oximetry   














  19





  13:16


 


Temperature 


 


Pulse Rate 106 H


 


Pulse Rate [ 





Bilateral 





Throughout] 


 


Pulse Rate [ 





From Monitor] 


 


Respiratory 21





Rate 


 


Respiratory 





Rate [Bilateral 





Throughout] 


 


Blood Pressure 154/93


 


O2 Sat by Pulse 99





Oximetry 














- General physical appearance


well developed





- Labs





                                 19 03:44





                                 19 03:44


                                 Diabetes panel











  19 Range/Units





  03:44 


 


Sodium  138  (137-145)  mmol/L


 


Potassium  3.0 L  (3.6-5.0)  mmol/L


 


Chloride  104.8  ()  mmol/L


 


Carbon Dioxide  18 L  (22-30)  mmol/L


 


BUN  28 H  (7-17)  mg/dL


 


Creatinine  3.1 H  (0.7-1.2)  mg/dL


 


Glucose  139 H  ()  mg/dL


 


Calcium  7.7 L  (8.4-10.2)  mg/dL


 


AST  36  (5-40)  units/L


 


ALT  24  (7-56)  units/L


 


Alkaline Phosphatase  82  ()  units/L


 


Total Protein  5.3 L  (6.3-8.2)  g/dL


 


Albumin  2.5 L  (3.9-5)  g/dL








                                  Calcium panel











  19 Range/Units





  03:44 


 


Calcium  7.7 L  (8.4-10.2)  mg/dL


 


Phosphorus  2.90  (2.5-4.5)  mg/dL


 


Albumin  2.5 L  (3.9-5)  g/dL








                                 Pituitary panel











  19 Range/Units





  03:44 


 


Sodium  138  (137-145)  mmol/L


 


Potassium  3.0 L  (3.6-5.0)  mmol/L


 


Chloride  104.8  ()  mmol/L


 


Carbon Dioxide  18 L  (22-30)  mmol/L


 


BUN  28 H  (7-17)  mg/dL


 


Creatinine  3.1 H  (0.7-1.2)  mg/dL


 


Glucose  139 H  ()  mg/dL


 


Calcium  7.7 L  (8.4-10.2)  mg/dL








                                  Adrenal panel











  19 Range/Units





  03:44 


 


Sodium  138  (137-145)  mmol/L


 


Potassium  3.0 L  (3.6-5.0)  mmol/L


 


Chloride  104.8  ()  mmol/L


 


Carbon Dioxide  18 L  (22-30)  mmol/L


 


BUN  28 H  (7-17)  mg/dL


 


Creatinine  3.1 H  (0.7-1.2)  mg/dL


 


Glucose  139 H  ()  mg/dL


 


Calcium  7.7 L  (8.4-10.2)  mg/dL


 


Total Bilirubin  0.40  (0.1-1.2)  mg/dL


 


AST  36  (5-40)  units/L


 


ALT  24  (7-56)  units/L


 


Alkaline Phosphatase  82  ()  units/L


 


Total Protein  5.3 L  (6.3-8.2)  g/dL


 


Albumin  2.5 L  (3.9-5)  g/dL

## 2019-07-08 NOTE — XRAY REPORT
CHEST 1 VIEW



INDICATION: 

follow up respiratory failure.



COMPARISON: 

One day prior.



FINDINGS:



Support devices: Unchanged.



Heart: Stable. 



Lungs/Pleura: There is significant improvement in previously seen bilateral pulmonary opacities. No n
ew findings.  







IMPRESSION:

1. Pulmonary opacities have significantly improved. No new findings.



Signer Name: Bryan Galvan MD 

Signed: 7/8/2019 3:09 AM

 Workstation Name: [email or web address]

## 2019-07-08 NOTE — PROGRESS NOTE
Assessment and Plan


Assessment and plan: 





38-year-old  female who is s/p  on 19.  Patient was 

transferred to [facility] for management of pulmonary edema.  Chest x-ray showed mild 

cardiomegaly and pulmonary edema. She was given lasix. On night of , worse 

respiratory distress, rapid response team called and she was intubated put on 

vent. Also now has NAZARIO, managed by nephrology. Pulmonary edema thought to be non

cardiogenic, likely ARDS





Acute resp failure s/p intubated , placed on vent 


ARDS


Pulmonary edema, noncardiogenic


Labile blood pressure


Sinus tachycardia


s/p  on 19


GBS positive


History of herpes simplex 2


History of preeclampsia


NAZARIO probably ATN


Leukocytosis








Plan:


Continue supportive care


Intubated night of 


NAZARIO worse today Cr 3.1. May be ATN, had episodes of hypotension. Nephrology 

following


Patient was given Lasix


Routine pulm toileting


Cardiology following


Pulmonology following


Leukocytosis. On Empiric Cefepime , ID following


DVT PPX on Lovenox











History


Interval history: 


Patient became worse nght of , respiratory distress, Code MET called, 

intubated put on vent








Hospitalist Physical





- Physical exam


Narrative exam: 


Gen: Not in acute distress, lying in bed, intubated,sedated 


HEENT: Normocephalic, atraumatic


Neck: supple, no JVD


Heart: S1 and S2 reg, no murmurs, rubs or gallop


Lungs: Bilateral crackles, no wheeze


Abd: soft, non tender, non distended, normal BS


Ext: No edema, no clubbing, no cyanosis, 


Neuro: Intubated, sedated. arouseable, follows commands, moves all ext








- Constitutional


Vitals: 


                                        











Temp Pulse Resp BP Pulse Ox


 


 98.5 F   110 H  29 H  120/77   97 


 


 19 03:52  19 06:30  19 06:30  19 06:30  19 06:30











General appearance: Present: no acute distress, obese





Results





- Labs


CBC & Chem 7: 


                                 19 03:44





                                 19 03:44


Labs: 


                             Laboratory Last Values











WBC  14.8 K/mm3 (4.5-11.0)  H  19  03:44    


 


RBC  3.12 M/mm3 (3.65-5.03)  L  19  03:44    


 


Hgb  9.8 gm/dl (10.1-14.3)  L  19  03:44    


 


Hct  28.5 % (30.3-42.9)  L  19  03:44    


 


MCV  91 fl (79-97)   19  03:44    


 


MCH  31 pg (28-32)   19  03:44    


 


MCHC  34 % (30-34)   19  03:44    


 


RDW  17.5 % (13.2-15.2)  H  19  03:44    


 


Plt Count  127 K/mm3 (140-440)  L  19  03:44    


 


Lymph % (Auto)  3.5 % (13.4-35.0)  L  19  23:41    


 


Mono % (Auto)  7.7 % (0.0-7.3)  H  19  23:41    


 


Eos % (Auto)  0.0 % (0.0-4.3)   19  23:41    


 


Baso % (Auto)  0.1 % (0.0-1.8)   19  23:41    


 


Lymph #  0.6 K/mm3 (1.2-5.4)  L  19  23:41    


 


Mono #  1.4 K/mm3 (0.0-0.8)  H  19  23:41    


 


Eos #  0.0 K/mm3 (0.0-0.4)   19  23:41    


 


Baso #  0.0 K/mm3 (0.0-0.1)   19  23:41    


 


Add Manual Diff  Complete   19  22:35    


 


Total Counted  100   19  22:35    


 


Seg Neutrophils %  88.7 % (40.0-70.0)  H  19  23:41    


 


Seg Neuts % (Manual)  79.0 % (40.0-70.0)  H  19  22:35    


 


  3.0 %  19  22:35    


 


  12.0 % (13.4-35.0)  L  19  22:35    


 


Reactive Lymphs % (Man)  0 %  19  22:35    


 


  2.0 % (0.0-7.3)   19  22:35    


 


  0 % (0.0-4.3)   19  22:35    


 


  0 % (0.0-1.8)   19  22:35    


 


  2.0 %  19  22:35    


 


  2.0 %  19  22:35    


 


  0 %  19  22:35    


 


  0 %  19  22:35    


 


Nucleated RBC %  Not Reportable   19  22:35    


 


Seg Neutrophils #  16.2 K/mm3 (1.8-7.7)  H  19  23:41    


 


Seg Neutrophils # Man  16.4 K/mm3 (1.8-7.7)  H  19  22:35    


 


Band Neutrophils #  0.6 K/mm3  19  22:35    


 


  2.5 K/mm3 (1.2-5.4)   19  22:35    


 


Abs React Lymphs (Man)  0.0 K/mm3  19  22:35    


 


  0.4 K/mm3 (0.0-0.8)   19  22:35    


 


  0.0 K/mm3 (0.0-0.4)   19  22:35    


 


  0.0 K/mm3 (0.0-0.1)   19  22:35    


 


  0.4 K/mm3  19  22:35    


 


  0.4 K/mm3  19  22:35    


 


  0.0 K/mm3  19  22:35    


 


Blast Cells #  0.0 K/mm3  19  22:35    


 


WBC Morphology  Not Reportable   19  22:35    


 


Hypersegmented Neuts  Not Reportable   19  22:35    


 


Hyposegmented Neuts  Not Reportable   19  22:35    


 


Hypogranular Neuts  Not Reportable   19  22:35    


 


  Not Reportable   19  22:35    


 


  Not Reportable   19  22:35    


 


  Not Reportable   19  22:35    


 


  Not Reportable   19  22:35    


 


  Not Reportable   19  22:35    


 


  Not Reportable   19  22:35    


 


  Consistent w auto   19  22:35    


 


  Not Reportable   19  22:35    


 


Plt Clumps, EDTA  Not Reportable   19  22:35    


 


  Not Reportable   19  22:35    


 


  Not Reportable   19  22:35    


 


  Not Reportable   19  22:35    


 


Plt Morphology Comment  Not Reportable   19  22:35    


 


RBC Morphology  Not Reportable   19  22:35    


 


Dimorphic RBCs  Not Reportable   19  22:35    


 


  Not Reportable   19  22:35    


 


  Not Reportable   19  22:35    


 


  Not Reportable   19  22:35    


 


  1+   19  22:35    


 


  Not Reportable   19  22:35    


 


  1+   19  22:35    


 


  Not Reportable   19  22:35    


 


  Not Reportable   19  22:35    


 


  Not Reportable   19  22:35    


 


  Not Reportable   19  22:35    


 


  Not Reportable   19  22:35    


 


  Not Reportable   19  22:35    


 


  Not Reportable   19  22:35    


 


  Not Reportable   19  22:35    


 


  Not Reportable   19  22:35    


 


  Not Reportable   19  22:35    


 


  Not Reportable   19  22:35    


 


  Not Reportable   19  22:35    


 


  Not Reportable   19  22:35    


 


Acanthocytes (Spur)  Not Reportable   19  22:35    


 


Rouleaux  Not Reportable   19  22:35    


 


  Not Reportable   19  22:35    


 


  Not Reportable   19  22:35    


 


  Not Reportable   19  22:35    


 


  Not Reportable   19  22:35    


 


Hem Pathologist Commnt  No   19  22:35    


 


PT  12.4 Sec. (12.2-14.9)   19  22:35    


 


INR  0.95  (0.87-1.13)   19  22:35    


 


APTT  20.7 Sec. (24.2-36.6)  L  19  22:35    


 


POC ABG pH  7.385  (7.35-7.45)   19  05:07    


 


POC ABG pCO2  39.0  (35-45)   19  05:53    


 


POC ABG pO2  114  ()  H  19  05:07    


 


POC ABG HCO3  17.1  (22-26 mml/L)   19  05:07    


 


POC ABG Total CO2  18  (23-27mmol/L)   19  05:07    


 


POC ABG O2 Sat  98   19  05:07    


 


POC ABG Base Excess  -8  ((-2) - (+3)mmol/L)   19  05:07    


 


  25 %  19  05:07    


 


Sodium  138 mmol/L (137-145)   19  03:44    


 


Potassium  3.0 mmol/L (3.6-5.0)  L  19  03:44    


 


Chloride  104.8 mmol/L ()   19  03:44    


 


Carbon Dioxide  18 mmol/L (22-30)  L  19  03:44    


 


  18 mmol/L  19  03:44    


 


BUN  28 mg/dL (7-17)  H  19  03:44    


 


  3.1 mg/dL (0.7-1.2)  H  19  03:44    


 


Estimated GFR  17 ml/min  19  03:44    


 


  9 %  19  03:44    


 


Glucose  139 mg/dL ()  H  19  03:44    


 


POC Glucose  151  ()  H  19  05:37    


 


  283 Mosm/kg  19  10:38    


 


Lactic Acid  1.00 mmol/L (0.7-2.0)   19  12:43    


 


  6.4 mg/dL (3.5-7.6)   19  10:38    


 


Calcium  7.7 mg/dL (8.4-10.2)  L  19  03:44    


 


Phosphorus  2.90 mg/dL (2.5-4.5)   19  03:44    


 


Magnesium  1.80 mg/dL (1.7-2.3)   19  03:44    


 


  0.40 mg/dL (0.1-1.2)   19  03:44    


 


AST  36 units/L (5-40)   19  03:44    


 


ALT  24 units/L (7-56)   19  03:44    


 


  82 units/L ()   19  03:44    


 


  248 units/L ()  H  19  16:12    


 


  431 units/L ()  H  19  10:38    


 


CK-MB (CK-2)  9.7 ng/mL (0.0-4.0)  H  19  22:35    


 


CK-MB (CK-2) Rel Index  3.6  (0-4)   19  22:35    


 


  0.473 ng/mL (0.00-0.029)  H* D 19  10:38    


 


  14.60 mg/dL (0.00-1.30)  H  19  14:11    


 


NT-Pro-B Natriuret Pep  01736 pg/mL (0-450)  H  19  12:43    


 


  5.3 g/dL (6.3-8.2)  L  19  03:44    


 


  2.5 g/dL (3.9-5)  L  19  03:44    


 


  0.9 %  19  03:44    


 


Triglycerides  540 mg/dL (2-149)  H  19  23:41    


 


Cholesterol  291 mg/dL ()  H  19  23:41    


 


  TNR   19  23:41    


 


  60 mg/dL (40-59)  H  19  23:41    


 


  4.85 %  19  23:41    


 


  Marie  (Yellow)   19  08:35    


 


  Turbid  (Clear)   19  08:35    


 


  5.0  (5.0-7.0)   19  08:35    


 


Ur Specific Gravity  1.023  (1.003-1.030)   19  08:35    


 


  100 mg/dl mg/dL (Negative)   19  08:35    


 


  >=500 mg/dL (Negative)   19  08:35    


 


  20 mg/dL (Negative)   19  08:35    


 


  Lg  (Negative)   19  08:35    


 


  Neg  (Negative)   19  08:35    


 


  Neg  (Negative)   19  08:35    


 


  < 2.0 mg/dL (<2.0)   19  08:35    


 


Ur Leukocyte Esterase  Sm  (Negative)   19  08:35    


 


  37.0 /HPF (0.0-6.0)  H  19  08:35    


 


  59.0 /HPF (0.0-6.0)   19  08:35    


 


U Epithel Cells (Auto)  1.0 /HPF (0-13.0)   19  08:35    


 


Uric Acid Crystals  Few   19  08:35    


 


  34.9 mg/dL (0.1-20.0)  H  19  10:19    


 


  72 mmol/L  19  10:19    


 


RPR  Nonreactive  (Nonreactive)   19  19:45    


 


Blood Type  O NEGATIVE   19  23:41    


 


Antibody Screen  Negative   19  23:41    


 


Fetal Screen  Negative   19  23:41    














Active Medications





- Current Medications


Current Medications: 














Generic Name Dose Route Start Last Admin





  Trade Name Macairo  PRN Reason Stop Dose Admin


 


Al Hydrox/Mg Hydrox/Simethicone  30 ml  19 23:16 





  Alum-Mag Hydrox-Simeth 844-208-04za/5ml  PO  





  Q4H PRN  





  Indigestion  


 


Albuterol  2.5 mg  19 08:00  19 01:12





  Proventil  IH   2.5 mg





  Q8HRT AYAKA   Administration


 


Bisacodyl  10 mg  19 23:16 





  Dulcolax  CO  





  QDAY PRN  





  constipation unrelieved by MOM  


 


Famotidine  20 mg  19 10:00  19 08:08





  Pepcid  IV   Not Given





  BID AYAKA  


 


Fentanyl  50 mcg  19 23:25 





  Sublimaze  IV  





  Q10MIN PRN  





  ANALGESIA  


 


Hydrophilic Ointment  1 applic  19 22:12 





  Vaseline Lip Therapy  TP  





  Q2HR PRN  





  Dry Lips  


 


Oxytocin/Sodium Chloride  20 units in 1,000 mls @ 250 mls/hr  19 23:16 





  Pitocin/Ns 20 Unit/1000ml Drip  IV  





  AS DIRECT AYAKA  


 


Fentanyl Citrate  2,000 mcg in 100 mls @ 3.856 mls/hr  19 23:45  19 

05:00





  Fentanyl Drip Premix  IV   Infused





  TITR AYAKA   Titration





  Protocol  





  1 MCG/KG/HR  


 


Midazolam HCl 100 mg/ Sodium  100 mls @ 2 mls/hr  19 23:45  19 11:23





  Chloride  IV   2 mg/hr





  TITR AYAKA   2 mls/hr





    Administration





  Protocol  





  2 MG/HR  


 


Cefepime HCl  2 gm in 100 mls @ 200 mls/hr  19 15:00  19 03:09





  Maxipime/Ns 2 Gm/100 Ml  IV   200 mls/hr





  Q12H AYAKA   Administration





  Protocol  


 


Magnesium Hydroxide  30 ml  19 23:16 





  Milk Of Magnesia  PO  





  Q4H PRN  





  Constipation  


 


Methylprednisolone Sodium Succinate  80 mg  19 09:00  19 08:07





  Solu-Medrol  IV  19 10:00  Not Given





  Q8H AYAKA  


 


Metoclopramide HCl  10 mg  19 23:16 





  Reglan  IV  





  Q6H PRN  





  Nausea And Vomiting  


 


Midazolam HCl  2 mg  19 23:25 





  Versed  IV  





  Q10MIN PRN  





  Sedation  


 


Morphine Sulfate  2 mg  19 23:19 





  Morphine  IV  





  Q4H PRN  





  Pain, Moderate (4-6)  


 


Multi-Ingred Cream/Lotion/Oil/Oint  1 applic  19 22:12 





  Artificial Tears Ophth Oint  OU  





  Q4HR PRN  





  Dry Eye(s)  


 


Multi-Ingredient Ointment  1 applic  19 23:16 





  Lansinoh  TP  





  PRN PRN  





  dryness/cracking  


 


Naloxone HCl  0.1 mg  19 23:16 





  Narcan 0.4 Mg/1 Ml  IV  





  Q2MIN PRN  





  Res Rate </= 8 or 02 SAT < 92%  


 


Sodium Chloride  10 ml  19 23:16  19 08:08





  Sodium Chloride Flush Syringe 10 Ml  IV   Not Given





  BID AYAKA  


 


Sodium Chloride  10 ml  19 23:16 





  Sodium Chloride Flush Syringe 10 Ml  IV  





  PRN PRN  





  LINE FLUSH  


 


Witch Hazel/Glycerin  1 each  19 23:16 





  Tucks Pad  TP  





  PRN PRN  





  Hemorrhoids/cleansing/soothing  














Nutrition/Malnutrition Assess





- Dietary Evaluation


Nutrition/Malnutrition Findings: 


                                        





Nutrition Notes                                            Start:  19 

13:21


Freq:                                                      Status: Active       




Protocol:                                                                       




 Document     19 13:21  RM  (Rec: 19 13:31    [id number])


 Nutrition Notes


     Need for Assessment generated from:         MD Order


     Initial or Follow up                        Assessment


     Current Diagnosis                           Acute Kidney Injury,


                                                 Respiratory Failure


     Other Pertinent Diagnosis                   S/P C section, Surgical


                                                 abdominal wound, GBS,


                                                 Oligohydramnios


     Current Diet                                NPO


     Labs/Tests                                  Reviewed


     Pertinent Medications                       Solu-Medrol


     Height                                      5 ft 1 in


     Weight                                      77.111 kg


     Ideal Body Weight (kg)                      47.72


     BMI                                         32.1


     Subjective/Other Information                Consulted for evaluate


                                                 nutritional intake.


                                                 Pt on vent.


     Burn                                        Absent


     Trauma                                      Absent


     #1


      Nutrition Diagnosis                        Inadequate oral intake


      Etiology                                   on vent


      As Evidenced by Signs and Symptoms         NPO status


     Is patient on ventilator?                   Yes


     Is Patient Ambulatory and/or Out of Bed     No


     REE-([facility]-confined to bed)      [phone]


     Calculation Used for Recommendations        [facility]


     Additional Notes                            Protein Needs: 95g (2 g/kg IBW


                                                 )


                                                 Fluid Needs: 1 ml/kcal


 Nutrition Intervention


     Change Diet Order:                          TF consult


     Nutrition Support:                          Vital 1.2 at 60 ml/hr.


                                                 Water flush of 100 mls q 4 hrs


                                                 .


     Kcal                                        1,728


     Protein (gm)                                108


     Fluid (mL)                                  1,168


     Goal #1                                     TF consult


     Anticipated Discharge Needs:                Unable to determine at this


                                                 time


     Follow-Up By:                               19


     Additional Comments                         Follow for TF consult

## 2019-07-08 NOTE — PROGRESS NOTE
Assessment and Plan





S/P 


Acute Pulmonary edema


Acute renal failure


Acute respiratory failure


Elevated troponin


Pre-eclampsia





Echo reports a normal left ventricular size and function LVEF 45-50%.





Supportive cardiac management.





Subjective


Date of service: 19


Principal diagnosis: 1) POD #2 S/P 1LTCS 2)LOW UOP 3) gestatinal hypertension 4)

pulmonary edema


Interval history: 





Alert but remains intubated on the vent.





Objective


                                   Vital Signs











  Temp Pulse Pulse Pulse Resp Resp BP


 


 19 09:30   86    25 H   122/74


 


 19 09:21    105 H    25 H 


 


 19 09:16   121 H    25 H   122/74


 


 19 09:11    83    25 H 


 


 19 09:00   83    25 H   122/74


 


 19 08:59   25 L      122/74


 


 19 08:46   85    25 H   140/91


 


 19 08:30   94 H    25 H   140/91


 


 19 08:16   99 H    25 H   140/91


 


 19 08:08  99.2 F      


 


 19 08:00  99.2 F  96 H   98 H  25 H   140/91


 


 19 07:46   83    25 H   137/80


 


 19 07:30   77    25 H   137/80


 


 19 07:16   84    25 H   137/80


 


 19 07:00   102 H    25 H   137/80


 


 19 06:46   105 H    23   120/77


 


 19 06:30   110 H    29 H   120/77


 


 19 06:16   121 H    28 H   120/77


 


 19 06:00   103 H    23   120/77


 


 19 05:46   91 H    25 H   120/77


 


 19 05:30   86    26 H   120/77


 


 19 05:16   107 H    19   120/77


 


 19 05:00   93 H    21   120/77


 


 19 04:59   89      120/77


 


 19 04:46   81    24   101/54


 


 19 04:30   81    26 H   101/54


 


 19 04:16   73    22   101/54


 


 19 04:00   74    22   109/62


 


 19 03:52  98.5 F      


 


 19 03:46   80    25 H   101/54


 


 19 03:30   77    24   101/54


 


 19 03:16   77    22   101/54


 


 19 03:00   79    25 H   101/54


 


 19 02:46   85    23   105/59


 


 19 02:30   103 H    22   108/60


 


 19 02:16   82    25 H   108/60


 


 19 02:00   80    25 H   105/59


 


 19 01:46   82    25 H   108/60


 


 19 01:30   79    25 H   108/60


 


 19 01:18    74    25 H 


 


 19 01:16   72    25 H   108/60


 


 19 01:13    69    24 


 


 19 01:03   77      108/60


 


 19 01:00   70    25 H   108/60


 


 19 00:46   74    25 H   105/60


 


 19 00:30   76    25 H   111/67


 


 19 00:16   81    25 H   111/67


 


 19 00:00  97.4 F L  71   74  25 H   105/60


 


 19 23:46   76    24   87/55


 


 19 23:30   77    24   87/55


 


 19 23:16   77    25 H   87/55


 


 19 23:00   87    25 H   111/67


 


 19 22:46   75    26 H   87/55


 


 19 22:38   73    25 H   87/55


 


 19 22:30   75    25 H   87/55


 


 19 22:16   73    25 H   87/55


 


 19 22:00   76    25 H   87/55


 


 19 21:46   74    25 H   109/68


 


 19 21:30   81    25 H   109/68


 


 19 21:16   78    25 H   109/68


 


 19 21:00   85    25 H   109/68


 


 19 20:49   78      91/55


 


 19 20:45   78    25 H  


 


 19 20:31   76    25 H   91/55


 


 19 20:15   75    25 H   91/55


 


 19 20:00  97.6 F  78   76  21   91/55


 


 19 19:45   77    25 H   88/49


 


 19 19:31   82    17   88/49


 


 19 19:15   79    25 H   88/49


 


 19 19:00   78    25 H   88/49


 


 19 18:45   80    25 H   98/53


 


 19 18:31   79    22   98/53


 


 19 18:01   83      98/53


 


 19 18:00   80    26 H   98/53


 


 19 17:00   84    25 H   90/50


 


 19 16:00  98.3 F  85    25 H   92/59


 


 19 15:00   88    21   90/53


 


 19 14:22   86      94/55


 


 19 14:00   90    22   94/55


 


 19 13:00   96 H    21   92/55


 


 19 12:38    99 H    25 H 


 


 19 12:23    98 H    25 H 


 


 19 12:19   96 H      117/78


 


 19 12:00  99.8 F H  103 H    26 H   117/78


 


 19 11:00   92 H    16   91/53














  Pulse Ox


 


 19 09:30  100


 


 19 09:21 


 


 19 09:16  100


 


 19 09:11 


 


 19 09:00  100


 


 19 08:59  100


 


 19 08:46  100


 


 19 08:30  99


 


 19 08:16  98


 


 19 08:08 


 


 19 08:00  99


 


 19 07:46  99


 


 19 07:30  97


 


 19 07:16  99


 


 19 07:00  98


 


 19 06:46  99


 


 19 06:30  97


 


 19 06:16  95


 


 19 06:00  97


 


 19 05:46  99


 


 19 05:30  99


 


 19 05:16  98


 


 19 05:00  99


 


 19 04:59  99


 


 19 04:46  100


 


 19 04:30  100


 


 19 04:16  98


 


 19 04:00  98


 


 19 03:52 


 


 19 03:46  99


 


 19 03:30  99


 


 19 03:16  99


 


 19 03:00  99


 


 19 02:46  98


 


 19 02:30  99


 


 19 02:16  100


 


 19 02:00  99


 


 19 01:46  99


 


 19 01:30  100


 


 19 01:18 


 


 19 01:16  100


 


 19 01:13 


 


 19 01:03  100


 


 19 01:00  100


 


 19 00:46  100


 


 19 00:30  100


 


 19 00:16  100


 


 19 00:00  100


 


 19 23:46  100


 


 19 23:30  100


 


 19 23:16  100


 


 19 23:00  100


 


 19 22:46  100


 


 19 22:38  100


 


 19 22:30  100


 


 19 22:16  100


 


 19 22:00  100


 


 19 21:46  100


 


 19 21:30  100


 


 19 21:16  100


 


 19 21:00  99


 


 19 20:49  100


 


 19 20:45  100


 


 19 20:31  100


 


 19 20:15  100


 


 19 20:00  100


 


 19 19:45  100


 


 19 19:31  100


 


 19 19:15  100


 


 19 19:00  100


 


 19 18:45  100


 


 19 18:31  99


 


 19 18:01  100


 


 19 18:00  99


 


 19 17:00  99


 


 19 16:00  99


 


 19 15:00  99


 


 19 14:22  99


 


 19 14:00  99


 


 19 13:00  98


 


 19 12:38 


 


 19 12:23 


 


 19 12:19  98


 


 19 12:00  95


 


 19 11:00  96














- Physical Examination


General: Other (Intubated on mechanical ventilator)


HEENT: Positive: PERRL


Neck: Positive: trachea midline


Cardiac: Positive: Tachycardia


Lungs: Positive: Decreased Breath Sounds


Neuro: Positive: Grossly Intact


Abdomen: Positive: Other (post C/S surgery)


Extremities: Absent: edema





- Labs and Meds


                                 Cardiac Enzymes











  19 Range/Units





  03:44 


 


AST  36  (5-40)  units/L








                                       CBC











  19 Range/Units





  12:43 03:44 


 


WBC  16.1 H  14.8 H  (4.5-11.0)  K/mm3


 


RBC  3.47 L  3.12 L  (3.65-5.03)  M/mm3


 


Hgb  11.1  9.8 L  (10.1-14.3)  gm/dl


 


Hct  32.0  28.5 L  (30.3-42.9)  %


 


Plt Count  129 L  127 L  (140-440)  K/mm3








                          Comprehensive Metabolic Panel











  19 Range/Units





  12:43 03:44 


 


Sodium  134 L  138  (137-145)  mmol/L


 


Potassium  3.7  3.0 L  (3.6-5.0)  mmol/L


 


Chloride  100.6  104.8  ()  mmol/L


 


Carbon Dioxide  18 L  18 L  (22-30)  mmol/L


 


BUN  21 H  28 H  (7-17)  mg/dL


 


Creatinine  3.0 H  3.1 H  (0.7-1.2)  mg/dL


 


Glucose  166 H  139 H  ()  mg/dL


 


Calcium  7.6 L  7.7 L  (8.4-10.2)  mg/dL


 


AST   36  (5-40)  units/L


 


ALT   24  (7-56)  units/L


 


Alkaline Phosphatase   82  ()  units/L


 


Total Protein   5.3 L  (6.3-8.2)  g/dL


 


Albumin   2.5 L  (3.9-5)  g/dL

## 2019-07-08 NOTE — PROGRESS NOTE
Assessment and Plan





Imp:


1. S/p  for intrauterine pregnancy at 38 weeks


2. Pulmonary edema -> probably combined cardiogenic and non-cardiogenic (rapid 

yet incomplete improvement), the former perhaps related to periods of elevated 

BP/IVFs and the latter of ? etiology (DDx includes aspiration, sepsis, AFE)


3. Acute respiratory failure, hypoxia


4. NAZARIO


5. Thrombocytopenia of ? etiology





Rec:


1. Cultures are negative; on Cefipime per ID


2. Oxygenation has drastically improved; still with pulmonary edema on CXR, 

although much improved; recommend rest on ventilator today, then evaluate for 

extubation in the AM w/ sedation holiday + spontaneous breathing trial


3. Check DIC panel; SCDs ordered; dopplers negative 19


4. GI PPx


5. Would optimize blood pressure given grade III diastolic dysfunction noted in 

Echo report


6. Defer K repletion to renal


7. Repeat labs in AM





CCT 31 minutes





Plan of care reviewed w/ family in detail, they understand/agree





Subjective


Date of service: 19


Principal diagnosis: 1) POD #2 S/P 1LTCS 2)LOW UOP 3) gestatinal hypertension 4)

pulmonary edema


Interval history: 





No events. Sedated on PRVC, with FiO2 down to 25% and PEEP at 6. She cannot 

provide history. BP slightly elevated.





Active Medications





Albuterol (Proventil)  2.5 mg IH Q8HRT [facility]


   Last Admin: 19 09:11 Dose:  2.5 mg


   Documented by: 


Bisacodyl (Dulcolax)  10 mg CO QDAY PRN


   PRN Reason: constipation unrelieved by MOM


Famotidine (Pepcid)  20 mg IV DAILY [facility]


   Last Admin: 19 09:52 Dose:  20 mg


   Documented by: 


Fentanyl (Sublimaze)  50 mcg IV Q10MIN PRN


   PRN Reason: ANALGESIA


Hydrophilic Ointment (Vaseline Lip Therapy)  1 applic TP Q2HR PRN


   PRN Reason: Dry Lips


Oxytocin/Sodium Chloride (Pitocin/Ns 20 Unit/1000ml Drip)  20 units in 1,000 mls

@ 250 mls/hr IV AS DIRECT [facility]YAKA


Fentanyl Citrate (Fentanyl Drip Premix)  2,000 mcg in 100 mls @ 3.856 mls/hr IV 

TITR AYAKA; Protocol


   Last Admin: 19 12:13 Dose:  1 mcg/kg/hr, 3.856 mls/hr


   Documented by: 


Midazolam HCl 100 mg/ Sodium (Chloride)  100 mls @ 2 mls/hr IV TITR AYAKA; 

Protocol


   Last Titration: 19 09:50 Dose:  0 mg/hr, 0 mls/hr


   Documented by: 


Cefepime HCl (Maxipime/Ns 2 Gm/100 Ml)  2 gm in 100 mls @ 200 mls/hr IV Q24H 

AYAKA; Protocol


Magnesium Hydroxide (Milk Of Magnesia)  30 ml PO Q4H PRN


   PRN Reason: Constipation


Metoclopramide HCl (Reglan)  10 mg IV Q6H PRN


   PRN Reason: Nausea And Vomiting


Midazolam HCl (Versed)  2 mg IV Q10MIN PRN


   PRN Reason: Sedation


Morphine Sulfate (Morphine)  2 mg IV Q4H PRN


   PRN Reason: Pain, Moderate (4-6)


Multi-Ingred Cream/Lotion/Oil/Oint (Artificial Tears Ophth Oint)  1 applic OU 

Q4HR PRN


   PRN Reason: Dry Eye(s)


Multi-Ingredient Ointment (Lansinoh)  1 applic TP PRN PRN


   PRN Reason: dryness/cracking


Naloxone HCl (Narcan 0.4 Mg/1 Ml)  0.1 mg IV Q2MIN PRN


   PRN Reason: Res Rate </= 8 or 02 SAT < 92%


Sodium Chloride (Sodium Chloride Flush Syringe 10 Ml)  10 ml IV BID [facility]


   Last Admin: 19 09:52 Dose:  10 ml


   Documented by: 


Sodium Chloride (Sodium Chloride Flush Syringe 10 Ml)  10 ml IV PRN PRN


   PRN Reason: LINE FLUSH


Witch Hazel/Glycerin (Tucks Pad)  1 each TP PRN PRN


   PRN Reason: Hemorrhoids/cleansing/soothing











Objective


                               Vital Signs - 12hr











  19





  02:30 02:46 03:00


 


Temperature   


 


Pulse Rate 103 H 85 79


 


Pulse Rate [   





Bilateral   





Throughout]   


 


Pulse Rate [   





From Monitor]   


 


Respiratory 22 23 25 H





Rate   


 


Respiratory   





Rate [Bilateral   





Throughout]   


 


Blood Pressure 108/60 105/59 101/54


 


O2 Sat by Pulse 99 98 99





Oximetry   














  19





  03:16 03:30 03:46


 


Temperature   


 


Pulse Rate 77 77 80


 


Pulse Rate [   





Bilateral   





Throughout]   


 


Pulse Rate [   





From Monitor]   


 


Respiratory 22 24 25 H





Rate   


 


Respiratory   





Rate [Bilateral   





Throughout]   


 


Blood Pressure 101/54 101/54 101/54


 


O2 Sat by Pulse 99 99 99





Oximetry   














  19





  03:52 04:00 04:16


 


Temperature 98.5 F  


 


Pulse Rate  74 73


 


Pulse Rate [   





Bilateral   





Throughout]   


 


Pulse Rate [   





From Monitor]   


 


Respiratory  22 22





Rate   


 


Respiratory   





Rate [Bilateral   





Throughout]   


 


Blood Pressure  109/62 101/54


 


O2 Sat by Pulse  98 98





Oximetry   














  19





  04:30 04:46 04:59


 


Temperature   


 


Pulse Rate 81 81 89


 


Pulse Rate [   





Bilateral   





Throughout]   


 


Pulse Rate [   





From Monitor]   


 


Respiratory 26 H 24 





Rate   


 


Respiratory   





Rate [Bilateral   





Throughout]   


 


Blood Pressure 101/54 101/54 120/77


 


O2 Sat by Pulse 100 100 99





Oximetry   














  19





  05:00 05:16 05:30


 


Temperature   


 


Pulse Rate 93 H 107 H 86


 


Pulse Rate [   





Bilateral   





Throughout]   


 


Pulse Rate [   





From Monitor]   


 


Respiratory 21 19 26 H





Rate   


 


Respiratory   





Rate [Bilateral   





Throughout]   


 


Blood Pressure 120/77 120/77 120/77


 


O2 Sat by Pulse 99 98 99





Oximetry   














  19





  05:46 06:00 06:16


 


Temperature   


 


Pulse Rate 91 H 103 H 121 H


 


Pulse Rate [   





Bilateral   





Throughout]   


 


Pulse Rate [   





From Monitor]   


 


Respiratory 25 H 23 28 H





Rate   


 


Respiratory   





Rate [Bilateral   





Throughout]   


 


Blood Pressure 120/77 120/77 120/77


 


O2 Sat by Pulse 99 97 95





Oximetry   














  19





  06:30 06:46 07:00


 


Temperature   


 


Pulse Rate 110 H 105 H 102 H


 


Pulse Rate [   





Bilateral   





Throughout]   


 


Pulse Rate [   





From Monitor]   


 


Respiratory 29 H 23 25 H





Rate   


 


Respiratory   





Rate [Bilateral   





Throughout]   


 


Blood Pressure 120/77 120/77 137/80


 


O2 Sat by Pulse 97 99 98





Oximetry   














  19





  07:16 07:30 07:46


 


Temperature   


 


Pulse Rate 84 77 83


 


Pulse Rate [   





Bilateral   





Throughout]   


 


Pulse Rate [   





From Monitor]   


 


Respiratory 25 H 25 H 25 H





Rate   


 


Respiratory   





Rate [Bilateral   





Throughout]   


 


Blood Pressure 137/80 137/80 137/80


 


O2 Sat by Pulse 99 97 99





Oximetry   














  19





  08:00 08:08 08:16


 


Temperature 99.2 F 99.2 F 


 


Pulse Rate 96 H  99 H


 


Pulse Rate [   





Bilateral   





Throughout]   


 


Pulse Rate [ 98 H  





From Monitor]   


 


Respiratory 25 H  25 H





Rate   


 


Respiratory   





Rate [Bilateral   





Throughout]   


 


Blood Pressure 140/91  140/91


 


O2 Sat by Pulse 99  98





Oximetry   














  19





  08:30 08:46 08:59


 


Temperature   


 


Pulse Rate 94 H 85 25 L


 


Pulse Rate [   





Bilateral   





Throughout]   


 


Pulse Rate [   





From Monitor]   


 


Respiratory 25 H 25 H 





Rate   


 


Respiratory   





Rate [Bilateral   





Throughout]   


 


Blood Pressure 140/91 140/91 122/74


 


O2 Sat by Pulse 99 100 100





Oximetry   














  19





  09:00 09:11 09:16


 


Temperature   


 


Pulse Rate 83  121 H


 


Pulse Rate [  83 





Bilateral   





Throughout]   


 


Pulse Rate [   





From Monitor]   


 


Respiratory 25 H  25 H





Rate   


 


Respiratory  25 H 





Rate [Bilateral   





Throughout]   


 


Blood Pressure 122/74  122/74


 


O2 Sat by Pulse 100  100





Oximetry   














  19





  09:21 09:30 09:46


 


Temperature   


 


Pulse Rate  86 107 H


 


Pulse Rate [ 105 H  





Bilateral   





Throughout]   


 


Pulse Rate [   





From Monitor]   


 


Respiratory  25 H 22





Rate   


 


Respiratory 25 H  





Rate [Bilateral   





Throughout]   


 


Blood Pressure  122/74 122/74


 


O2 Sat by Pulse  100 99





Oximetry   














  19





  10:00 10:16 10:30


 


Temperature   


 


Pulse Rate 116 H 99 H 96 H


 


Pulse Rate [   





Bilateral   





Throughout]   


 


Pulse Rate [   





From Monitor]   


 


Respiratory 16 22 24





Rate   


 


Respiratory   





Rate [Bilateral   





Throughout]   


 


Blood Pressure 122/74 148/99 148/99


 


O2 Sat by Pulse 98 98 99





Oximetry   














  19





  10:46 11:00 11:16


 


Temperature   


 


Pulse Rate 103 H 95 H 92 H


 


Pulse Rate [   





Bilateral   





Throughout]   


 


Pulse Rate [   





From Monitor]   


 


Respiratory 24 24 26 H





Rate   


 


Respiratory   





Rate [Bilateral   





Throughout]   


 


Blood Pressure 148/99 139/87 139/87


 


O2 Sat by Pulse 98 100 99





Oximetry   














  19





  11:30 11:46 12:00


 


Temperature   99.4 F


 


Pulse Rate 98 H 91 H 118 H


 


Pulse Rate [   





Bilateral   





Throughout]   


 


Pulse Rate [   98 H





From Monitor]   


 


Respiratory 24 18 23





Rate   


 


Respiratory   





Rate [Bilateral   





Throughout]   


 


Blood Pressure 139/87 139/87 153/87


 


O2 Sat by Pulse 99 100 99





Oximetry   














  19





  12:16 12:18 12:30


 


Temperature   


 


Pulse Rate 109 H 108 H 105 H


 


Pulse Rate [   





Bilateral   





Throughout]   


 


Pulse Rate [   





From Monitor]   


 


Respiratory 22  19





Rate   


 


Respiratory   





Rate [Bilateral   





Throughout]   


 


Blood Pressure 153/87 153/87 139/87


 


O2 Sat by Pulse 99 99 99





Oximetry   














  19





  12:46 13:00 13:16


 


Temperature   


 


Pulse Rate 103 H 104 H 106 H


 


Pulse Rate [   





Bilateral   





Throughout]   


 


Pulse Rate [   





From Monitor]   


 


Respiratory 24 25 H 21





Rate   


 


Respiratory   





Rate [Bilateral   





Throughout]   


 


Blood Pressure 139/87 153/87 154/93


 


O2 Sat by Pulse 99 99 99





Oximetry   











Constitutional: other (sedated, critically ill on ventilator)


Eyes: non-icteric


ENT: oropharynx moist


Neck: supple


Effort: normal


Ascultation: Bilateral: clear


Cardiovascular: other (mild tachy, RR; no mrg)


Gastrointestinal: hypoactive bowel sounds


Integumentary: normal


Extremities: no cyanosis, no edema, pink and warm


Neurologic: other (sedated)


Psychiatric: other (sedated)


CBC and BMP: 


                                 19 03:44





                                 19 03:44


ABG, PT/INR, D-dimer: 


ABG











POC ABG pH  7.385  (7.35-7.45)   19  05:07    


 


POC ABG pO2  114  ()  H  19  05:07    


 


POC ABG HCO3  17.1  (22-26 mml/L)   19  05:07    


 


POC ABG Total CO2  18  (23-27mmol/L)   19  05:07    


 


POC ABG O2 Sat  98   19  05:07    





PT/INR, D-dimer











PT  12.4 Sec. (12.2-14.9)   19  22:35    


 


INR  0.95  (0.87-1.13)   19  22:35    








Abnormal lab findings: 


                                  Abnormal Labs











  19





  19:45 16:12 16:12


 


WBC  12.3 H  16.3 H 


 


RBC   


 


Hgb   


 


Hct   


 


MCHC   


 


RDW  16.9 H  16.6 H 


 


Plt Count   


 


Lymph % (Auto)   


 


Mono % (Auto)   


 


Lymph #   


 


Mono #   


 


Seg Neutrophils %   


 


Seg Neuts % (Manual)   


 


Lymphocytes % (Manual)   


 


Seg Neutrophils #   


 


Seg Neutrophils # Man   


 


APTT   


 


POC ABG pH   


 


POC ABG pCO2   


 


POC ABG pO2   


 


Sodium   


 


Potassium   


 


Chloride   


 


Carbon Dioxide   


 


BUN   


 


Creatinine    0.5 L


 


Glucose   


 


POC Glucose   


 


Calcium   


 


Lactate Dehydrogenase    248 H


 


Total Creatine Kinase   


 


CK-MB (CK-2)   


 


CK-MB (CK-2) Rel Index   


 


Troponin T   


 


C-Reactive Protein   


 


NT-Pro-B Natriuret Pep   


 


Total Protein   


 


Albumin   


 


Triglycerides   


 


Cholesterol   


 


HDL Cholesterol   


 


Urine WBC (Auto)   


 


Urine Creatinine   














  19





  23:41 23:41 23:41


 


WBC  18.3 H  


 


RBC   


 


Hgb   


 


Hct   


 


MCHC   


 


RDW  16.6 H  


 


Plt Count   


 


Lymph % (Auto)  3.5 L  


 


Mono % (Auto)  7.7 H  


 


Lymph #  0.6 L  


 


Mono #  1.4 H  


 


Seg Neutrophils %  88.7 H  


 


Seg Neuts % (Manual)   


 


Lymphocytes % (Manual)   


 


Seg Neutrophils #  16.2 H  


 


Seg Neutrophils # Man   


 


APTT   


 


POC ABG pH   


 


POC ABG pCO2   


 


POC ABG pO2   


 


Sodium   


 


Potassium   


 


Chloride   


 


Carbon Dioxide   


 


BUN   


 


Creatinine   


 


Glucose   


 


POC Glucose   


 


Calcium   


 


Lactate Dehydrogenase   


 


Total Creatine Kinase   


 


CK-MB (CK-2)   


 


CK-MB (CK-2) Rel Index   


 


Troponin T    0.274 H*


 


C-Reactive Protein   


 


NT-Pro-B Natriuret Pep   5198 H 


 


Total Protein   


 


Albumin   


 


Triglycerides    540 H


 


Cholesterol    291 H


 


HDL Cholesterol    60 H


 


Urine WBC (Auto)   


 


Urine Creatinine   














  19





  23:41 04:40 04:40


 


WBC   


 


RBC   


 


Hgb   


 


Hct   


 


MCHC   


 


RDW   


 


Plt Count   


 


Lymph % (Auto)   


 


Mono % (Auto)   


 


Lymph #   


 


Mono #   


 


Seg Neutrophils %   


 


Seg Neuts % (Manual)   


 


Lymphocytes % (Manual)   


 


Seg Neutrophils #   


 


Seg Neutrophils # Man   


 


APTT   


 


POC ABG pH   


 


POC ABG pCO2   


 


POC ABG pO2   


 


Sodium   


 


Potassium   


 


Chloride    108.3 H


 


Carbon Dioxide    21 L


 


BUN   


 


Creatinine   


 


Glucose    195 H


 


POC Glucose   


 


Calcium    7.7 L


 


Lactate Dehydrogenase   


 


Total Creatine Kinase  155 H  


 


CK-MB (CK-2)  11.4 H  


 


CK-MB (CK-2) Rel Index  7.3 H  


 


Troponin T   0.177 H* D 


 


C-Reactive Protein   


 


NT-Pro-B Natriuret Pep   


 


Total Protein   


 


Albumin   


 


Triglycerides   


 


Cholesterol   


 


HDL Cholesterol   


 


Urine WBC (Auto)   


 


Urine Creatinine   














  19





  08:35 09:47 22:35


 


WBC    20.8 H


 


RBC   


 


Hgb   


 


Hct   


 


MCHC   


 


RDW    17.2 H


 


Plt Count   


 


Lymph % (Auto)   


 


Mono % (Auto)   


 


Lymph #   


 


Mono #   


 


Seg Neutrophils %   


 


Seg Neuts % (Manual)    79.0 H


 


Lymphocytes % (Manual)    12.0 L


 


Seg Neutrophils #   


 


Seg Neutrophils # Man    16.4 H


 


APTT   


 


POC ABG pH   


 


POC ABG pCO2   


 


POC ABG pO2   


 


Sodium   


 


Potassium   


 


Chloride   


 


Carbon Dioxide   


 


BUN   


 


Creatinine   


 


Glucose   


 


POC Glucose   


 


Calcium   


 


Lactate Dehydrogenase   


 


Total Creatine Kinase   


 


CK-MB (CK-2)   


 


CK-MB (CK-2) Rel Index   


 


Troponin T   0.157 H* 


 


C-Reactive Protein   


 


NT-Pro-B Natriuret Pep   


 


Total Protein   


 


Albumin   


 


Triglycerides   


 


Cholesterol   


 


HDL Cholesterol   


 


Urine WBC (Auto)  37.0 H  


 


Urine Creatinine   














  19





  22:35 22:35 22:44


 


WBC   


 


RBC   


 


Hgb   


 


Hct   


 


MCHC   


 


RDW   


 


Plt Count   


 


Lymph % (Auto)   


 


Mono % (Auto)   


 


Lymph #   


 


Mono #   


 


Seg Neutrophils %   


 


Seg Neuts % (Manual)   


 


Lymphocytes % (Manual)   


 


Seg Neutrophils #   


 


Seg Neutrophils # Man   


 


APTT  20.7 L  


 


POC ABG pH    7.046 L


 


POC ABG pCO2    > 70 H


 


POC ABG pO2    62 L


 


Sodium   


 


Potassium   


 


Chloride   


 


Carbon Dioxide   


 


BUN   


 


Creatinine   


 


Glucose   


 


POC Glucose   


 


Calcium   


 


Lactate Dehydrogenase   


 


Total Creatine Kinase   267 H 


 


CK-MB (CK-2)   9.7 H 


 


CK-MB (CK-2) Rel Index   


 


Troponin T   0.313 H* D 


 


C-Reactive Protein   


 


NT-Pro-B Natriuret Pep   


 


Total Protein   


 


Albumin   


 


Triglycerides   


 


Cholesterol   


 


HDL Cholesterol   


 


Urine WBC (Auto)   


 


Urine Creatinine   














  19





  00:03 01:24 04:28


 


WBC   


 


RBC   


 


Hgb   


 


Hct   


 


MCHC   


 


RDW   


 


Plt Count   


 


Lymph % (Auto)   


 


Mono % (Auto)   


 


Lymph #   


 


Mono #   


 


Seg Neutrophils %   


 


Seg Neuts % (Manual)   


 


Lymphocytes % (Manual)   


 


Seg Neutrophils #   


 


Seg Neutrophils # Man   


 


APTT   


 


POC ABG pH   7.246 L 


 


POC ABG pCO2   47.0 H 


 


POC ABG pO2   72 L 


 


Sodium   


 


Potassium   


 


Chloride   


 


Carbon Dioxide   


 


BUN   


 


Creatinine   


 


Glucose   


 


POC Glucose  152 H  


 


Calcium   


 


Lactate Dehydrogenase   


 


Total Creatine Kinase   


 


CK-MB (CK-2)   


 


CK-MB (CK-2) Rel Index   


 


Troponin T    0.740 H* D


 


C-Reactive Protein   


 


NT-Pro-B Natriuret Pep   


 


Total Protein   


 


Albumin   


 


Triglycerides   


 


Cholesterol   


 


HDL Cholesterol   


 


Urine WBC (Auto)   


 


Urine Creatinine   














  19





  05:33 05:53 10:19


 


WBC   


 


RBC   


 


Hgb   


 


Hct   


 


MCHC   


 


RDW   


 


Plt Count   


 


Lymph % (Auto)   


 


Mono % (Auto)   


 


Lymph #   


 


Mono #   


 


Seg Neutrophils %   


 


Seg Neuts % (Manual)   


 


Lymphocytes % (Manual)   


 


Seg Neutrophils #   


 


Seg Neutrophils # Man   


 


APTT   


 


POC ABG pH   7.328 L 


 


POC ABG pCO2   


 


POC ABG pO2   256 H 


 


Sodium   


 


Potassium   


 


Chloride   


 


Carbon Dioxide   


 


BUN   


 


Creatinine   


 


Glucose   


 


POC Glucose  153 H  


 


Calcium   


 


Lactate Dehydrogenase   


 


Total Creatine Kinase   


 


CK-MB (CK-2)   


 


CK-MB (CK-2) Rel Index   


 


Troponin T   


 


C-Reactive Protein   


 


NT-Pro-B Natriuret Pep   


 


Total Protein   


 


Albumin   


 


Triglycerides   


 


Cholesterol   


 


HDL Cholesterol   


 


Urine WBC (Auto)   


 


Urine Creatinine    34.9 H














  19





  10:38 10:38 12:43


 


WBC   


 


RBC   


 


Hgb   


 


Hct   


 


MCHC   


 


RDW   


 


Plt Count   


 


Lymph % (Auto)   


 


Mono % (Auto)   


 


Lymph #   


 


Mono #   


 


Seg Neutrophils %   


 


Seg Neuts % (Manual)   


 


Lymphocytes % (Manual)   


 


Seg Neutrophils #   


 


Seg Neutrophils # Man   


 


APTT   


 


POC ABG pH   


 


POC ABG pCO2   


 


POC ABG pO2   


 


Sodium    134 L


 


Potassium   


 


Chloride   


 


Carbon Dioxide    18 L


 


BUN    21 H


 


Creatinine    3.0 H


 


Glucose    166 H


 


POC Glucose   


 


Calcium    7.6 L


 


Lactate Dehydrogenase   


 


Total Creatine Kinase   431 H 


 


CK-MB (CK-2)   


 


CK-MB (CK-2) Rel Index   


 


Troponin T  0.473 H* D  


 


C-Reactive Protein   


 


NT-Pro-B Natriuret Pep    48393 H


 


Total Protein   


 


Albumin   


 


Triglycerides   


 


Cholesterol   


 


HDL Cholesterol   


 


Urine WBC (Auto)   


 


Urine Creatinine   














  19





  12:43 13:02 14:11


 


WBC  16.1 H  


 


RBC  3.47 L  


 


Hgb   


 


Hct   


 


MCHC  35 H  


 


RDW  17.1 H  


 


Plt Count  129 L  


 


Lymph % (Auto)   


 


Mono % (Auto)   


 


Lymph #   


 


Mono #   


 


Seg Neutrophils %   


 


Seg Neuts % (Manual)   


 


Lymphocytes % (Manual)   


 


Seg Neutrophils #   


 


Seg Neutrophils # Man   


 


APTT   


 


POC ABG pH   


 


POC ABG pCO2   


 


POC ABG pO2   


 


Sodium   


 


Potassium   


 


Chloride   


 


Carbon Dioxide   


 


BUN   


 


Creatinine   


 


Glucose   


 


POC Glucose   163 H 


 


Calcium   


 


Lactate Dehydrogenase   


 


Total Creatine Kinase   


 


CK-MB (CK-2)   


 


CK-MB (CK-2) Rel Index   


 


Troponin T   


 


C-Reactive Protein    14.60 H


 


NT-Pro-B Natriuret Pep   


 


Total Protein   


 


Albumin   


 


Triglycerides   


 


Cholesterol   


 


HDL Cholesterol   


 


Urine WBC (Auto)   


 


Urine Creatinine   














  19





  17:39 23:02 Unknown


 


WBC   


 


RBC   


 


Hgb   


 


Hct   


 


MCHC   


 


RDW   


 


Plt Count   


 


Lymph % (Auto)   


 


Mono % (Auto)   


 


Lymph #   


 


Mono #   


 


Seg Neutrophils %   


 


Seg Neuts % (Manual)   


 


Lymphocytes % (Manual)   


 


Seg Neutrophils #   


 


Seg Neutrophils # Man   


 


APTT   


 


POC ABG pH   


 


POC ABG pCO2   


 


POC ABG pO2   


 


Sodium    134 L D


 


Potassium   


 


Chloride    97.9 L


 


Carbon Dioxide    18 L


 


BUN   


 


Creatinine    2.3 H D


 


Glucose    225 H


 


POC Glucose  162 H  155 H 


 


Calcium    7.4 L


 


Lactate Dehydrogenase   


 


Total Creatine Kinase   


 


CK-MB (CK-2)   


 


CK-MB (CK-2) Rel Index   


 


Troponin T   


 


C-Reactive Protein   


 


NT-Pro-B Natriuret Pep   


 


Total Protein   


 


Albumin   


 


Triglycerides   


 


Cholesterol   


 


HDL Cholesterol   


 


Urine WBC (Auto)   


 


Urine Creatinine   














  19





  03:44 03:44 05:07


 


WBC  14.8 H  


 


RBC  3.12 L  


 


Hgb  9.8 L  


 


Hct  28.5 L  


 


MCHC   


 


RDW  17.5 H  


 


Plt Count  127 L  


 


Lymph % (Auto)   


 


Mono % (Auto)   


 


Lymph #   


 


Mono #   


 


Seg Neutrophils %   


 


Seg Neuts % (Manual)   


 


Lymphocytes % (Manual)   


 


Seg Neutrophils #   


 


Seg Neutrophils # Man   


 


APTT   


 


POC ABG pH   


 


POC ABG pCO2   


 


POC ABG pO2    114 H


 


Sodium   


 


Potassium   3.0 L 


 


Chloride   


 


Carbon Dioxide   18 L 


 


BUN   28 H 


 


Creatinine   3.1 H 


 


Glucose   139 H 


 


POC Glucose   


 


Calcium   7.7 L 


 


Lactate Dehydrogenase   


 


Total Creatine Kinase   


 


CK-MB (CK-2)   


 


CK-MB (CK-2) Rel Index   


 


Troponin T   


 


C-Reactive Protein   


 


NT-Pro-B Natriuret Pep   


 


Total Protein   5.3 L 


 


Albumin   2.5 L 


 


Triglycerides   


 


Cholesterol   


 


HDL Cholesterol   


 


Urine WBC (Auto)   


 


Urine Creatinine   














  19





  05:37 12:45


 


WBC  


 


RBC  


 


Hgb  


 


Hct  


 


MCHC  


 


RDW  


 


Plt Count  


 


Lymph % (Auto)  


 


Mono % (Auto)  


 


Lymph #  


 


Mono #  


 


Seg Neutrophils %  


 


Seg Neuts % (Manual)  


 


Lymphocytes % (Manual)  


 


Seg Neutrophils #  


 


Seg Neutrophils # Man  


 


APTT  


 


POC ABG pH  


 


POC ABG pCO2  


 


POC ABG pO2  


 


Sodium  


 


Potassium  


 


Chloride  


 


Carbon Dioxide  


 


BUN  


 


Creatinine  


 


Glucose  


 


POC Glucose  151 H  170 H


 


Calcium  


 


Lactate Dehydrogenase  


 


Total Creatine Kinase  


 


CK-MB (CK-2)  


 


CK-MB (CK-2) Rel Index  


 


Troponin T  


 


C-Reactive Protein  


 


NT-Pro-B Natriuret Pep  


 


Total Protein  


 


Albumin  


 


Triglycerides  


 


Cholesterol  


 


HDL Cholesterol  


 


Urine WBC (Auto)  


 


Urine Creatinine  











Chest x-ray: report reviewed, image reviewed (improving bilateral infiltrates)

## 2019-07-09 LAB
ALBUMIN SERPL-MCNC: 2.6 G/DL (ref 3.9–5)
ALT SERPL-CCNC: 83 UNITS/L (ref 7–56)
BASOPHILS # (AUTO): 0.1 K/MM3 (ref 0–0.1)
BASOPHILS NFR BLD AUTO: 0.3 % (ref 0–1.8)
BUN SERPL-MCNC: 32 MG/DL (ref 7–17)
BUN SERPL-MCNC: 33 MG/DL (ref 7–17)
BUN/CREAT SERPL: 14 %
BUN/CREAT SERPL: 19 %
CALCIUM SERPL-MCNC: 8.5 MG/DL (ref 8.4–10.2)
CALCIUM SERPL-MCNC: 8.5 MG/DL (ref 8.4–10.2)
EOSINOPHIL # BLD AUTO: 0 K/MM3 (ref 0–0.4)
EOSINOPHIL NFR BLD AUTO: 0 % (ref 0–4.3)
HCT VFR BLD CALC: 34.1 % (ref 30.3–42.9)
HEMOLYSIS INDEX: 127
HEMOLYSIS INDEX: 44
HGB BLD-MCNC: 11.7 GM/DL (ref 10.1–14.3)
LYMPHOCYTES # BLD AUTO: 1 K/MM3 (ref 1.2–5.4)
LYMPHOCYTES NFR BLD AUTO: 5.9 % (ref 13.4–35)
MCHC RBC AUTO-ENTMCNC: 34 % (ref 30–34)
MCV RBC AUTO: 91 FL (ref 79–97)
MONOCYTES # (AUTO): 1.4 K/MM3 (ref 0–0.8)
MONOCYTES % (AUTO): 8.2 % (ref 0–7.3)
PLATELET # BLD: 158 K/MM3 (ref 140–440)
RBC # BLD AUTO: 3.77 M/MM3 (ref 3.65–5.03)

## 2019-07-09 PROCEDURE — 0D9670Z DRAINAGE OF STOMACH WITH DRAINAGE DEVICE, VIA NATURAL OR ARTIFICIAL OPENING: ICD-10-PCS | Performed by: OBSTETRICS & GYNECOLOGY

## 2019-07-09 RX ADMIN — POTASSIUM CHLORIDE SCH MLS/HR: 10 INJECTION, SOLUTION INTRAVENOUS at 12:00

## 2019-07-09 RX ADMIN — METOCLOPRAMIDE SCH MG: 5 INJECTION, SOLUTION INTRAMUSCULAR; INTRAVENOUS at 23:15

## 2019-07-09 RX ADMIN — ALBUTEROL SULFATE SCH: 2.5 SOLUTION RESPIRATORY (INHALATION) at 14:20

## 2019-07-09 RX ADMIN — PANTOPRAZOLE SODIUM SCH MG: 40 INJECTION, POWDER, FOR SOLUTION INTRAVENOUS at 23:15

## 2019-07-09 RX ADMIN — HYDRALAZINE HYDROCHLORIDE PRN MG: 20 INJECTION INTRAMUSCULAR; INTRAVENOUS at 23:16

## 2019-07-09 RX ADMIN — HYDRALAZINE HYDROCHLORIDE PRN MG: 20 INJECTION INTRAMUSCULAR; INTRAVENOUS at 04:19

## 2019-07-09 RX ADMIN — Medication SCH ML: at 23:16

## 2019-07-09 RX ADMIN — POTASSIUM CHLORIDE SCH MLS/HR: 10 INJECTION, SOLUTION INTRAVENOUS at 13:05

## 2019-07-09 RX ADMIN — HYDRALAZINE HYDROCHLORIDE PRN MG: 20 INJECTION INTRAMUSCULAR; INTRAVENOUS at 14:27

## 2019-07-09 RX ADMIN — METOCLOPRAMIDE SCH MG: 5 INJECTION, SOLUTION INTRAMUSCULAR; INTRAVENOUS at 19:15

## 2019-07-09 RX ADMIN — Medication SCH ML: at 10:21

## 2019-07-09 RX ADMIN — PANTOPRAZOLE SODIUM SCH MG: 40 INJECTION, POWDER, FOR SOLUTION INTRAVENOUS at 15:15

## 2019-07-09 RX ADMIN — FAMOTIDINE SCH MG: 10 INJECTION, SOLUTION INTRAVENOUS at 10:21

## 2019-07-09 RX ADMIN — METOCLOPRAMIDE SCH MG: 5 INJECTION, SOLUTION INTRAMUSCULAR; INTRAVENOUS at 12:01

## 2019-07-09 RX ADMIN — POTASSIUM CHLORIDE SCH MLS/HR: 10 INJECTION, SOLUTION INTRAVENOUS at 11:06

## 2019-07-09 RX ADMIN — ALBUTEROL SULFATE SCH: 2.5 SOLUTION RESPIRATORY (INHALATION) at 16:08

## 2019-07-09 RX ADMIN — POTASSIUM CHLORIDE SCH MLS/HR: 10 INJECTION, SOLUTION INTRAVENOUS at 14:16

## 2019-07-09 NOTE — XRAY REPORT
CHEST 1 VIEW



INDICATION: 

follow up respiratory failure.



COMPARISON: 

One day prior.



FINDINGS:



Support devices: Unchanged.



Heart: Stable. 



Lungs/Pleura: There is continued improvement in bilateral pulmonary opacities. No new findings.  







IMPRESSION:

1. Continued improvement in pulmonary opacities.



Signer Name: Bryan Galvan MD 

Signed: 7/9/2019 2:58 AM

 Workstation Name: [email or web address] [employer]

## 2019-07-09 NOTE — XRAY REPORT
ABDOMEN 1 VIEW(S)



INDICATION / CLINICAL INFORMATION:

Placement of NG tube.



COMPARISON: 

7/9/2019 at 0958 hours



FINDINGS:



TUBES / LINES: There appear to be 2 nasogastric tubes present. One terminates in the gastric fundus a
nd the second terminates in the distal stomach near the pylorus.

BOWEL GAS PATTERN: There is moderate to large gas throughout small and large bowel loops suggestive o
f an ileus 

FREE AIR / EXTRALUMINAL GAS: None seen.



ADDITIONAL FINDINGS: No significant additional findings.



IMPRESSION:

1. Nasogastric tubes as described.



Signer Name: Romain Farley Jr, MD 

Signed: 7/9/2019 2:23 PM

 Workstation Name: [id number]

## 2019-07-09 NOTE — PROGRESS NOTE
Assessment and Plan





Imp:


1. S/p  for intrauterine pregnancy at 38 weeks


2. Pulmonary edema -> probably combined cardiogenic and non-cardiogenic (rapid 

yet incomplete improvement), the former perhaps related to periods of elevated 

BP/IVFs and the latter of ? etiology (DDx includes aspiration, sepsis, AFE)


3. Acute respiratory failure, hypoxia


4. NAZARIO


5. Thrombocytopenia of ? etiology


6. Ileus


7. Hypernatremia


8. Hypokalemia


9. Transaminitis


10. Anion-gap metabolic acidosis, presumed related to #4 at this point





Rec:


1. Cultures are negative; on Cefipime per ID


2. Pulmonary mechanics are excellent, as is ABG and mentation; meets criteria 

for extubation; will do so once NG tube placement is confirmed


3. SCDs are at the bedside but patient has been refusing; explained the 

importance of SCDs to the patient/family, and they understand the risk of DVT; 

holding on chemical PPx due to low platelets


4. Would optimize blood pressure given grade III diastolic dysfunction noted in 

Echo report


5. Replete K and repeat level this afternoon; check Mag and Phos in AM


6. Re: N/V, ileus -> she is passing gas; will give her 24 hours of scheduled 

Reglan and change to PPI IV BID; monitor H/H; will change OG to NG tube prior to

extubation, and keep the NG to LIWS so as to ensure no vomiting/aspiration 

issues arise


7. Repeat LFTs in AM and check Lipase, Lactic acid


8. She has clinically improved but prognosis remains guarded





CCT 31 minutes





Plan of care reviewed w/ family in detail, they understand/agree; all questions 

have been answered





Subjective


Date of service: 19


Principal diagnosis: 1) POD #4 S/P 1LTCS  2)gestatinal hypertension 3) acute 

resp failur


Interval history: 





Vomited some brown material this AM, then clear material after that. OG hooked 

up to LIWS. Received Reglan for nausea with improvement. Awake, alert, doing 

perfectly well on PSV 6/5 x 2 hours, with RSBI of 29, TV > 500, and normal 

respirations, O2 sat of 99% on 25% FiO2. Follows commands. Denies SOB.





Active Medications





Albuterol (Proventil)  2.5 mg IH Q8HRT AYAKA


   Last Admin: 19 23:29 Dose:  2.5 mg


   Documented by: 


Bisacodyl (Dulcolax)  10 mg UT QDAY PRN


   PRN Reason: constipation unrelieved by MOM


Hydralazine HCl (Apresoline)  10 mg IV Q4HR PRN


   PRN Reason: SBP > 160


   Last Admin: 19 04:19 Dose:  10 mg


   Documented by: 


Hydrophilic Ointment (Vaseline Lip Therapy)  1 applic TP Q2HR PRN


   PRN Reason: Dry Lips


Oxytocin/Sodium Chloride (Pitocin/Ns 20 Unit/1000ml Drip)  20 units in 1,000 mls

@ 250 mls/hr IV AS DIRECT AYAKA


Fentanyl Citrate (Fentanyl Drip Premix)  2,000 mcg in 100 mls @ 3.856 mls/hr IV 

TITR AYAKA; Protocol


   Last Titration: 19 06:20 Dose:  0 mcg/kg/hr, 0 mls/hr


   Documented by: 


Midazolam HCl 100 mg/ Sodium (Chloride)  100 mls @ 2 mls/hr IV TITR AYAKA; 

Protocol


   Last Titration: 19 19:12 Dose:  0 mg/hr, 0 mls/hr


   Documented by: 


Cefepime HCl (Maxipime/Ns 2 Gm/100 Ml)  2 gm in 100 mls @ 200 mls/hr IV Q24H AYAKA

; Protocol


   Last Admin: 19 15:20 Dose:  200 mls/hr


   Documented by: 


Potassium Chloride (Kcl 10meq/100ml)  10 meq in 100 mls @ 100 mls/hr IV Q1H AYAKA


   Stop: 19 14:59


   Last Admin: 19 13:05 Dose:  100 mls/hr


   Documented by: 


Magnesium Hydroxide (Milk Of Magnesia)  30 ml PO Q4H PRN


   PRN Reason: Constipation


Metoclopramide HCl (Reglan)  10 mg IV Q6H PRN


   PRN Reason: Nausea And Vomiting


   Last Admin: 19 08:58 Dose:  10 mg


   Documented by: 


Metoclopramide HCl (Reglan)  5 mg IV Q6HR AYAKA


   Stop: 07/10/19 06:01


   Last Admin: 19 12:01 Dose:  5 mg


   Documented by: 


Morphine Sulfate (Morphine)  2 mg IV Q4H PRN


   PRN Reason: Pain, Moderate (4-6)


   Last Admin: 19 17:24 Dose:  2 mg


   Documented by: 


Multi-Ingred Cream/Lotion/Oil/Oint (Artificial Tears Ophth Oint)  1 applic OU Q4

HR PRN


   PRN Reason: Dry Eye(s)


Multi-Ingredient Ointment (Lansinoh)  1 applic TP PRN PRN


   PRN Reason: dryness/cracking


Naloxone HCl (Narcan 0.4 Mg/1 Ml)  0.1 mg IV Q2MIN PRN


   PRN Reason: Res Rate </= 8 or 02 SAT < 92%


Pantoprazole Sodium (Protonix)  40 mg IV BID AYAKA


Sodium Chloride (Sodium Chloride Flush Syringe 10 Ml)  10 ml IV BID AYAKA


   Last Admin: 19 10:21 Dose:  10 ml


   Documented by: 


Sodium Chloride (Sodium Chloride Flush Syringe 10 Ml)  10 ml IV PRN PRN


   PRN Reason: LINE FLUSH


Witch Hazel/Glycerin (Tucks Pad)  1 each TP PRN PRN


   PRN Reason: Hemorrhoids/cleansing/soothing











Objective


                               Vital Signs - 12hr











  19





  02:00 03:00 03:30


 


Temperature   


 


Pulse Rate 87 112 H 88


 


Pulse Rate [   





From Monitor]   


 


Respiratory 19 47 H 36 H





Rate   


 


Blood Pressure 168/101 168/101 175/102


 


O2 Sat by Pulse 100 100 99





Oximetry   














  19





  03:43 03:57 04:00


 


Temperature 98.9 F  


 


Pulse Rate  95 H 98 H


 


Pulse Rate [   





From Monitor]   


 


Respiratory   24





Rate   


 


Blood Pressure  175/102 168/102


 


O2 Sat by Pulse  99 100





Oximetry   














  19





  04:19 04:30 05:00


 


Temperature   


 


Pulse Rate 93 H 107 H 122 H


 


Pulse Rate [   





From Monitor]   


 


Respiratory  26 H 44 H





Rate   


 


Blood Pressure 160/101 164/91 160/101


 


O2 Sat by Pulse  99 99





Oximetry   














  19





  05:30 06:00 06:30


 


Temperature   


 


Pulse Rate 116 H 113 H 107 H


 


Pulse Rate [   





From Monitor]   


 


Respiratory 21 16 25 H





Rate   


 


Blood Pressure 123/84 131/75 141/81


 


O2 Sat by Pulse 99 99 99





Oximetry   














  19





  07:00 07:30 08:00


 


Temperature   98.8 F


 


Pulse Rate 108 H 105 H 96 H


 


Pulse Rate [   110 H





From Monitor]   


 


Respiratory 20 21 25 H





Rate   


 


Blood Pressure 149/76 152/80 137/80


 


O2 Sat by Pulse 99 99 99





Oximetry   














  19





  08:30 09:00 09:27


 


Temperature   


 


Pulse Rate 113 H 124 H 117 H


 


Pulse Rate [   





From Monitor]   


 


Respiratory 19 28 H 18





Rate   


 


Blood Pressure 130/79 130/79 140/80


 


O2 Sat by Pulse 99 98 99





Oximetry   














  19





  09:30 10:00 10:30


 


Temperature   


 


Pulse Rate 124 H 122 H 104 H


 


Pulse Rate [   





From Monitor]   


 


Respiratory 23 14 18





Rate   


 


Blood Pressure 124/74 140/80 144/78


 


O2 Sat by Pulse 98 99 99





Oximetry   














  19





  11:00 11:30 12:00


 


Temperature   98.7 F


 


Pulse Rate 104 H 114 H 112 H


 


Pulse Rate [   97 H





From Monitor]   


 


Respiratory 16 17 18





Rate   


 


Blood Pressure 144/78 138/77 140/84


 


O2 Sat by Pulse 99 99 99





Oximetry   














  19





  12:30 13:00


 


Temperature  


 


Pulse Rate 110 H 102 H


 


Pulse Rate [  





From Monitor]  


 


Respiratory 19 17





Rate  


 


Blood Pressure 128/95 128/95


 


O2 Sat by Pulse 99 99





Oximetry  











Constitutional: other (awake, critically ill on ventilator)


Eyes: non-icteric


ENT: oropharynx moist


Neck: supple


Effort: normal


Ascultation: Bilateral: clear


Cardiovascular: other (mild tachy, RR; no mrg)


Gastrointestinal: normoactive bowel sounds, soft, other (distended, mildly TTP; 

no guarding/rebound)


Integumentary: normal


Extremities: no cyanosis, no edema, pink and warm


Neurologic: normal mental status, non-focal exam, pupils equal and round, CN II-

XII normal


Psychiatric: mood appropriate, affect normal


CBC and BMP: 


                                 19 04:29





                                 19 04:29


ABG, PT/INR, D-dimer: 


ABG











POC ABG pH  7.400  (7.35-7.45)   19  12:57    


 


POC ABG pCO2  30.6  (35-45)  L  19  12:57    


 


POC ABG pO2  125  ()  H  19  12:57    


 


POC ABG HCO3  19.0  (22-26 mml/L)   19  12:57    


 


POC ABG Total CO2  20  (23-27mmol/L)   19  12:57    


 


POC ABG O2 Sat  99   19  12:57    





PT/INR, D-dimer











PT  13.2 Sec. (12.2-14.9)   19  14:24    


 


INR  1.03  (0.87-1.13)   19  14:24    


 


  > 32540 ng/mlDDU (0-234)  H  19  14:24    








Abnormal lab findings: 


                                  Abnormal Labs











  19





  19:45 16:12 16:12


 


WBC  12.3 H  16.3 H 


 


RBC   


 


Hgb   


 


Hct   


 


MCHC   


 


RDW  16.9 H  16.6 H 


 


Plt Count   


 


Lymph % (Auto)   


 


Mono % (Auto)   


 


Lymph #   


 


Mono #   


 


Seg Neutrophils %   


 


Seg Neuts % (Manual)   


 


Lymphocytes % (Manual)   


 


Seg Neutrophils #   


 


Seg Neutrophils # Man   


 


APTT   


 


Fibrinogen   


 


D-Dimer   


 


POC ABG pH   


 


POC ABG pCO2   


 


POC ABG pO2   


 


Sodium   


 


Potassium   


 


Chloride   


 


Carbon Dioxide   


 


BUN   


 


Creatinine    0.5 L


 


Glucose   


 


POC Glucose   


 


Calcium   


 


AST   


 


ALT   


 


Lactate Dehydrogenase    248 H


 


Total Creatine Kinase   


 


CK-MB (CK-2)   


 


CK-MB (CK-2) Rel Index   


 


Troponin T   


 


C-Reactive Protein   


 


NT-Pro-B Natriuret Pep   


 


Total Protein   


 


Albumin   


 


Triglycerides   


 


Cholesterol   


 


HDL Cholesterol   


 


Urine WBC (Auto)   


 


Urine Creatinine   














  19





  23:41 23:41 23:41


 


WBC  18.3 H  


 


RBC   


 


Hgb   


 


Hct   


 


MCHC   


 


RDW  16.6 H  


 


Plt Count   


 


Lymph % (Auto)  3.5 L  


 


Mono % (Auto)  7.7 H  


 


Lymph #  0.6 L  


 


Mono #  1.4 H  


 


Seg Neutrophils %  88.7 H  


 


Seg Neuts % (Manual)   


 


Lymphocytes % (Manual)   


 


Seg Neutrophils #  16.2 H  


 


Seg Neutrophils # Man   


 


APTT   


 


Fibrinogen   


 


D-Dimer   


 


POC ABG pH   


 


POC ABG pCO2   


 


POC ABG pO2   


 


Sodium   


 


Potassium   


 


Chloride   


 


Carbon Dioxide   


 


BUN   


 


Creatinine   


 


Glucose   


 


POC Glucose   


 


Calcium   


 


AST   


 


ALT   


 


Lactate Dehydrogenase   


 


Total Creatine Kinase   


 


CK-MB (CK-2)   


 


CK-MB (CK-2) Rel Index   


 


Troponin T    0.274 H*


 


C-Reactive Protein   


 


NT-Pro-B Natriuret Pep   5198 H 


 


Total Protein   


 


Albumin   


 


Triglycerides    540 H


 


Cholesterol    291 H


 


HDL Cholesterol    60 H


 


Urine WBC (Auto)   


 


Urine Creatinine   














  19





  23:41 04:40 04:40


 


WBC   


 


RBC   


 


Hgb   


 


Hct   


 


MCHC   


 


RDW   


 


Plt Count   


 


Lymph % (Auto)   


 


Mono % (Auto)   


 


Lymph #   


 


Mono #   


 


Seg Neutrophils %   


 


Seg Neuts % (Manual)   


 


Lymphocytes % (Manual)   


 


Seg Neutrophils #   


 


Seg Neutrophils # Man   


 


APTT   


 


Fibrinogen   


 


D-Dimer   


 


POC ABG pH   


 


POC ABG pCO2   


 


POC ABG pO2   


 


Sodium   


 


Potassium   


 


Chloride    108.3 H


 


Carbon Dioxide    21 L


 


BUN   


 


Creatinine   


 


Glucose    195 H


 


POC Glucose   


 


Calcium    7.7 L


 


AST   


 


ALT   


 


Lactate Dehydrogenase   


 


Total Creatine Kinase  155 H  


 


CK-MB (CK-2)  11.4 H  


 


CK-MB (CK-2) Rel Index  7.3 H  


 


Troponin T   0.177 H* D 


 


C-Reactive Protein   


 


NT-Pro-B Natriuret Pep   


 


Total Protein   


 


Albumin   


 


Triglycerides   


 


Cholesterol   


 


HDL Cholesterol   


 


Urine WBC (Auto)   


 


Urine Creatinine   














  19





  08:35 09:47 22:35


 


WBC    20.8 H


 


RBC   


 


Hgb   


 


Hct   


 


MCHC   


 


RDW    17.2 H


 


Plt Count   


 


Lymph % (Auto)   


 


Mono % (Auto)   


 


Lymph #   


 


Mono #   


 


Seg Neutrophils %   


 


Seg Neuts % (Manual)    79.0 H


 


Lymphocytes % (Manual)    12.0 L


 


Seg Neutrophils #   


 


Seg Neutrophils # Man    16.4 H


 


APTT   


 


Fibrinogen   


 


D-Dimer   


 


POC ABG pH   


 


POC ABG pCO2   


 


POC ABG pO2   


 


Sodium   


 


Potassium   


 


Chloride   


 


Carbon Dioxide   


 


BUN   


 


Creatinine   


 


Glucose   


 


POC Glucose   


 


Calcium   


 


AST   


 


ALT   


 


Lactate Dehydrogenase   


 


Total Creatine Kinase   


 


CK-MB (CK-2)   


 


CK-MB (CK-2) Rel Index   


 


Troponin T   0.157 H* 


 


C-Reactive Protein   


 


NT-Pro-B Natriuret Pep   


 


Total Protein   


 


Albumin   


 


Triglycerides   


 


Cholesterol   


 


HDL Cholesterol   


 


Urine WBC (Auto)  37.0 H  


 


Urine Creatinine   














  19





  22:35 22:35 22:44


 


WBC   


 


RBC   


 


Hgb   


 


Hct   


 


MCHC   


 


RDW   


 


Plt Count   


 


Lymph % (Auto)   


 


Mono % (Auto)   


 


Lymph #   


 


Mono #   


 


Seg Neutrophils %   


 


Seg Neuts % (Manual)   


 


Lymphocytes % (Manual)   


 


Seg Neutrophils #   


 


Seg Neutrophils # Man   


 


APTT  20.7 L  


 


Fibrinogen   


 


D-Dimer   


 


POC ABG pH    7.046 L


 


POC ABG pCO2    > 70 H


 


POC ABG pO2    62 L


 


Sodium   


 


Potassium   


 


Chloride   


 


Carbon Dioxide   


 


BUN   


 


Creatinine   


 


Glucose   


 


POC Glucose   


 


Calcium   


 


AST   


 


ALT   


 


Lactate Dehydrogenase   


 


Total Creatine Kinase   267 H 


 


CK-MB (CK-2)   9.7 H 


 


CK-MB (CK-2) Rel Index   


 


Troponin T   0.313 H* D 


 


C-Reactive Protein   


 


NT-Pro-B Natriuret Pep   


 


Total Protein   


 


Albumin   


 


Triglycerides   


 


Cholesterol   


 


HDL Cholesterol   


 


Urine WBC (Auto)   


 


Urine Creatinine   














  19





  00:03 01:24 04:28


 


WBC   


 


RBC   


 


Hgb   


 


Hct   


 


MCHC   


 


RDW   


 


Plt Count   


 


Lymph % (Auto)   


 


Mono % (Auto)   


 


Lymph #   


 


Mono #   


 


Seg Neutrophils %   


 


Seg Neuts % (Manual)   


 


Lymphocytes % (Manual)   


 


Seg Neutrophils #   


 


Seg Neutrophils # Man   


 


APTT   


 


Fibrinogen   


 


D-Dimer   


 


POC ABG pH   7.246 L 


 


POC ABG pCO2   47.0 H 


 


POC ABG pO2   72 L 


 


Sodium   


 


Potassium   


 


Chloride   


 


Carbon Dioxide   


 


BUN   


 


Creatinine   


 


Glucose   


 


POC Glucose  152 H  


 


Calcium   


 


AST   


 


ALT   


 


Lactate Dehydrogenase   


 


Total Creatine Kinase   


 


CK-MB (CK-2)   


 


CK-MB (CK-2) Rel Index   


 


Troponin T    0.740 H* D


 


C-Reactive Protein   


 


NT-Pro-B Natriuret Pep   


 


Total Protein   


 


Albumin   


 


Triglycerides   


 


Cholesterol   


 


HDL Cholesterol   


 


Urine WBC (Auto)   


 


Urine Creatinine   














  19





  05:33 05:53 10:19


 


WBC   


 


RBC   


 


Hgb   


 


Hct   


 


MCHC   


 


RDW   


 


Plt Count   


 


Lymph % (Auto)   


 


Mono % (Auto)   


 


Lymph #   


 


Mono #   


 


Seg Neutrophils %   


 


Seg Neuts % (Manual)   


 


Lymphocytes % (Manual)   


 


Seg Neutrophils #   


 


Seg Neutrophils # Man   


 


APTT   


 


Fibrinogen   


 


D-Dimer   


 


POC ABG pH   7.328 L 


 


POC ABG pCO2   


 


POC ABG pO2   256 H 


 


Sodium   


 


Potassium   


 


Chloride   


 


Carbon Dioxide   


 


BUN   


 


Creatinine   


 


Glucose   


 


POC Glucose  153 H  


 


Calcium   


 


AST   


 


ALT   


 


Lactate Dehydrogenase   


 


Total Creatine Kinase   


 


CK-MB (CK-2)   


 


CK-MB (CK-2) Rel Index   


 


Troponin T   


 


C-Reactive Protein   


 


NT-Pro-B Natriuret Pep   


 


Total Protein   


 


Albumin   


 


Triglycerides   


 


Cholesterol   


 


HDL Cholesterol   


 


Urine WBC (Auto)   


 


Urine Creatinine    34.9 H














  19





  10:38 10:38 12:43


 


WBC   


 


RBC   


 


Hgb   


 


Hct   


 


MCHC   


 


RDW   


 


Plt Count   


 


Lymph % (Auto)   


 


Mono % (Auto)   


 


Lymph #   


 


Mono #   


 


Seg Neutrophils %   


 


Seg Neuts % (Manual)   


 


Lymphocytes % (Manual)   


 


Seg Neutrophils #   


 


Seg Neutrophils # Man   


 


APTT   


 


Fibrinogen   


 


D-Dimer   


 


POC ABG pH   


 


POC ABG pCO2   


 


POC ABG pO2   


 


Sodium    134 L


 


Potassium   


 


Chloride   


 


Carbon Dioxide    18 L


 


BUN    21 H


 


Creatinine    3.0 H


 


Glucose    166 H


 


POC Glucose   


 


Calcium    7.6 L


 


AST   


 


ALT   


 


Lactate Dehydrogenase   


 


Total Creatine Kinase   431 H 


 


CK-MB (CK-2)   


 


CK-MB (CK-2) Rel Index   


 


Troponin T  0.473 H* D  


 


C-Reactive Protein   


 


NT-Pro-B Natriuret Pep    47380 H


 


Total Protein   


 


Albumin   


 


Triglycerides   


 


Cholesterol   


 


HDL Cholesterol   


 


Urine WBC (Auto)   


 


Urine Creatinine   














  19





  12:43 13:02 14:11


 


WBC  16.1 H  


 


RBC  3.47 L  


 


Hgb   


 


Hct   


 


MCHC  35 H  


 


RDW  17.1 H  


 


Plt Count  129 L  


 


Lymph % (Auto)   


 


Mono % (Auto)   


 


Lymph #   


 


Mono #   


 


Seg Neutrophils %   


 


Seg Neuts % (Manual)   


 


Lymphocytes % (Manual)   


 


Seg Neutrophils #   


 


Seg Neutrophils # Man   


 


APTT   


 


Fibrinogen   


 


D-Dimer   


 


POC ABG pH   


 


POC ABG pCO2   


 


POC ABG pO2   


 


Sodium   


 


Potassium   


 


Chloride   


 


Carbon Dioxide   


 


BUN   


 


Creatinine   


 


Glucose   


 


POC Glucose   163 H 


 


Calcium   


 


AST   


 


ALT   


 


Lactate Dehydrogenase   


 


Total Creatine Kinase   


 


CK-MB (CK-2)   


 


CK-MB (CK-2) Rel Index   


 


Troponin T   


 


C-Reactive Protein    14.60 H


 


NT-Pro-B Natriuret Pep   


 


Total Protein   


 


Albumin   


 


Triglycerides   


 


Cholesterol   


 


HDL Cholesterol   


 


Urine WBC (Auto)   


 


Urine Creatinine   














  19





  17:39 23:02 Unknown


 


WBC   


 


RBC   


 


Hgb   


 


Hct   


 


MCHC   


 


RDW   


 


Plt Count   


 


Lymph % (Auto)   


 


Mono % (Auto)   


 


Lymph #   


 


Mono #   


 


Seg Neutrophils %   


 


Seg Neuts % (Manual)   


 


Lymphocytes % (Manual)   


 


Seg Neutrophils #   


 


Seg Neutrophils # Man   


 


APTT   


 


Fibrinogen   


 


D-Dimer   


 


POC ABG pH   


 


POC ABG pCO2   


 


POC ABG pO2   


 


Sodium    134 L D


 


Potassium   


 


Chloride    97.9 L


 


Carbon Dioxide    18 L


 


BUN   


 


Creatinine    2.3 H D


 


Glucose    225 H


 


POC Glucose  162 H  155 H 


 


Calcium    7.4 L


 


AST   


 


ALT   


 


Lactate Dehydrogenase   


 


Total Creatine Kinase   


 


CK-MB (CK-2)   


 


CK-MB (CK-2) Rel Index   


 


Troponin T   


 


C-Reactive Protein   


 


NT-Pro-B Natriuret Pep   


 


Total Protein   


 


Albumin   


 


Triglycerides   


 


Cholesterol   


 


HDL Cholesterol   


 


Urine WBC (Auto)   


 


Urine Creatinine   














  19





  03:44 03:44 05:07


 


WBC  14.8 H  


 


RBC  3.12 L  


 


Hgb  9.8 L  


 


Hct  28.5 L  


 


MCHC   


 


RDW  17.5 H  


 


Plt Count  127 L  


 


Lymph % (Auto)   


 


Mono % (Auto)   


 


Lymph #   


 


Mono #   


 


Seg Neutrophils %   


 


Seg Neuts % (Manual)   


 


Lymphocytes % (Manual)   


 


Seg Neutrophils #   


 


Seg Neutrophils # Man   


 


APTT   


 


Fibrinogen   


 


D-Dimer   


 


POC ABG pH   


 


POC ABG pCO2   


 


POC ABG pO2    114 H


 


Sodium   


 


Potassium   3.0 L 


 


Chloride   


 


Carbon Dioxide   18 L 


 


BUN   28 H 


 


Creatinine   3.1 H 


 


Glucose   139 H 


 


POC Glucose   


 


Calcium   7.7 L 


 


AST   


 


ALT   


 


Lactate Dehydrogenase   


 


Total Creatine Kinase   


 


CK-MB (CK-2)   


 


CK-MB (CK-2) Rel Index   


 


Troponin T   


 


C-Reactive Protein   


 


NT-Pro-B Natriuret Pep   


 


Total Protein   5.3 L 


 


Albumin   2.5 L 


 


Triglycerides   


 


Cholesterol   


 


HDL Cholesterol   


 


Urine WBC (Auto)   


 


Urine Creatinine   














  19





  05:37 12:45 14:24


 


WBC   


 


RBC   


 


Hgb   


 


Hct   


 


MCHC   


 


RDW   


 


Plt Count   


 


Lymph % (Auto)   


 


Mono % (Auto)   


 


Lymph #   


 


Mono #   


 


Seg Neutrophils %   


 


Seg Neuts % (Manual)   


 


Lymphocytes % (Manual)   


 


Seg Neutrophils #   


 


Seg Neutrophils # Man   


 


APTT    21.7 L


 


Fibrinogen    586 H


 


D-Dimer    > 84244 H


 


POC ABG pH   


 


POC ABG pCO2   


 


POC ABG pO2   


 


Sodium   


 


Potassium   


 


Chloride   


 


Carbon Dioxide   


 


BUN   


 


Creatinine   


 


Glucose   


 


POC Glucose  151 H  170 H 


 


Calcium   


 


AST   


 


ALT   


 


Lactate Dehydrogenase   


 


Total Creatine Kinase   


 


CK-MB (CK-2)   


 


CK-MB (CK-2) Rel Index   


 


Troponin T   


 


C-Reactive Protein   


 


NT-Pro-B Natriuret Pep   


 


Total Protein   


 


Albumin   


 


Triglycerides   


 


Cholesterol   


 


HDL Cholesterol   


 


Urine WBC (Auto)   


 


Urine Creatinine   














  19





  18:05 23:41 04:13


 


WBC   


 


RBC   


 


Hgb   


 


Hct   


 


MCHC   


 


RDW   


 


Plt Count   


 


Lymph % (Auto)   


 


Mono % (Auto)   


 


Lymph #   


 


Mono #   


 


Seg Neutrophils %   


 


Seg Neuts % (Manual)   


 


Lymphocytes % (Manual)   


 


Seg Neutrophils #   


 


Seg Neutrophils # Man   


 


APTT   


 


Fibrinogen   


 


D-Dimer   


 


POC ABG pH   


 


POC ABG pCO2    < 30 L


 


POC ABG pO2    122 H


 


Sodium   


 


Potassium   


 


Chloride   


 


Carbon Dioxide   


 


BUN   


 


Creatinine   


 


Glucose   


 


POC Glucose  168 H  149 H 


 


Calcium   


 


AST   


 


ALT   


 


Lactate Dehydrogenase   


 


Total Creatine Kinase   


 


CK-MB (CK-2)   


 


CK-MB (CK-2) Rel Index   


 


Troponin T   


 


C-Reactive Protein   


 


NT-Pro-B Natriuret Pep   


 


Total Protein   


 


Albumin   


 


Triglycerides   


 


Cholesterol   


 


HDL Cholesterol   


 


Urine WBC (Auto)   


 


Urine Creatinine   














  19





  04:29 04:29 05:26


 


WBC  17.7 H  


 


RBC   


 


Hgb   


 


Hct   


 


MCHC   


 


RDW  17.3 H  


 


Plt Count   


 


Lymph % (Auto)  5.9 L  


 


Mono % (Auto)  8.2 H  


 


Lymph #  1.0 L  


 


Mono #  1.4 H  


 


Seg Neutrophils %  85.6 H  


 


Seg Neuts % (Manual)   


 


Lymphocytes % (Manual)   


 


Seg Neutrophils #  15.2 H  


 


Seg Neutrophils # Man   


 


APTT   


 


Fibrinogen   


 


D-Dimer   


 


POC ABG pH   


 


POC ABG pCO2   


 


POC ABG pO2   


 


Sodium   146 H D 


 


Potassium   2.8 L* 


 


Chloride   108.9 H 


 


Carbon Dioxide   17 L 


 


BUN   33 H 


 


Creatinine   2.3 H 


 


Glucose   146 H 


 


POC Glucose    138 H


 


Calcium   


 


AST   82 H 


 


ALT   83 H 


 


Lactate Dehydrogenase   


 


Total Creatine Kinase   


 


CK-MB (CK-2)   


 


CK-MB (CK-2) Rel Index   


 


Troponin T   


 


C-Reactive Protein   


 


NT-Pro-B Natriuret Pep   


 


Total Protein   6.2 L 


 


Albumin   2.6 L 


 


Triglycerides   


 


Cholesterol   


 


HDL Cholesterol   


 


Urine WBC (Auto)   


 


Urine Creatinine   














  19





  12:33 12:57


 


WBC  


 


RBC  


 


Hgb  


 


Hct  


 


MCHC  


 


RDW  


 


Plt Count  


 


Lymph % (Auto)  


 


Mono % (Auto)  


 


Lymph #  


 


Mono #  


 


Seg Neutrophils %  


 


Seg Neuts % (Manual)  


 


Lymphocytes % (Manual)  


 


Seg Neutrophils #  


 


Seg Neutrophils # Man  


 


APTT  


 


Fibrinogen  


 


D-Dimer  


 


POC ABG pH  


 


POC ABG pCO2   30.6 L


 


POC ABG pO2   125 H


 


Sodium  


 


Potassium  


 


Chloride  


 


Carbon Dioxide  


 


BUN  


 


Creatinine  


 


Glucose  


 


POC Glucose  140 H 


 


Calcium  


 


AST  


 


ALT  


 


Lactate Dehydrogenase  


 


Total Creatine Kinase  


 


CK-MB (CK-2)  


 


CK-MB (CK-2) Rel Index  


 


Troponin T  


 


C-Reactive Protein  


 


NT-Pro-B Natriuret Pep  


 


Total Protein  


 


Albumin  


 


Triglycerides  


 


Cholesterol  


 


HDL Cholesterol  


 


Urine WBC (Auto)  


 


Urine Creatinine  











Chest x-ray: report reviewed, image reviewed (continued improvement in pulmonary

edema)

## 2019-07-09 NOTE — PROGRESS NOTE
Assessment and Plan


Impression:


* NAZARIO with ATN


* sepsis


* ARDS


* post partum


* hypoxemia


* Hypernatremia


* Hypokalemia





Plan:


* Renal function continues to improve.  Serum creatinine down to 2.3 today.  


* Patient remains nonoliguric.  Approximately 2200 mL of urine recorded 

  yesterday.


* keep MAP >65


* avoid nephrotoxins


* Replace potassium


* Increase free water intake


* duresis as tolerated


* No indication for renal replacement therapy at this time


* Discussed with patient's  and mother at bedside











Subjective


Date of service: 07/09/19


Principal diagnosis: 1) POD #4 S/P 1LTCS 2)LOW UOP 3) gestatinal hypertension 4)

pulmonary edema


Interval history: 


Patient remains in the intensive care unit.  On the ventilator.  Currently on 

25% FiO2.  Sedated.








Objective





- Vital Signs


Vital signs: 


                               Vital Signs - 12hr











  07/08/19 07/08/19 07/08/19





  21:18 22:00 23:00


 


Temperature   


 


Pulse Rate 95 H 91 H 86


 


Pulse Rate [   





Bilateral   





Throughout]   


 


Respiratory 25 H 24 25 H





Rate   


 


Respiratory   





Rate [Bilateral   





Throughout]   


 


Blood Pressure 125/86 150/94 149/87


 


O2 Sat by Pulse 99 100 100





Oximetry   














  07/08/19 07/08/19 07/08/19





  23:27 23:29 23:37


 


Temperature   99.8 F H


 


Pulse Rate 92 H  


 


Pulse Rate [  92 H 





Bilateral   





Throughout]   


 


Respiratory   





Rate   


 


Respiratory  25 H 





Rate [Bilateral   





Throughout]   


 


Blood Pressure 149/87  


 


O2 Sat by Pulse 100  





Oximetry   














  07/08/19 07/09/19 07/09/19





  23:44 00:00 01:00


 


Temperature   


 


Pulse Rate  99 H 90


 


Pulse Rate [ 94 H  





Bilateral   





Throughout]   


 


Respiratory  15 35 H





Rate   


 


Respiratory 25 H  





Rate [Bilateral   





Throughout]   


 


Blood Pressure  149/87 167/94


 


O2 Sat by Pulse  99 100





Oximetry   














  07/09/19 07/09/19 07/09/19





  02:00 03:00 03:30


 


Temperature   


 


Pulse Rate 87 112 H 88


 


Pulse Rate [   





Bilateral   





Throughout]   


 


Respiratory 19 47 H 36 H





Rate   


 


Respiratory   





Rate [Bilateral   





Throughout]   


 


Blood Pressure 168/101 168/101 175/102


 


O2 Sat by Pulse 100 100 99





Oximetry   














  07/09/19 07/09/19 07/09/19





  03:43 03:57 04:00


 


Temperature 98.9 F  


 


Pulse Rate  95 H 98 H


 


Pulse Rate [   





Bilateral   





Throughout]   


 


Respiratory   24





Rate   


 


Respiratory   





Rate [Bilateral   





Throughout]   


 


Blood Pressure  175/102 168/102


 


O2 Sat by Pulse  99 100





Oximetry   














  07/09/19 07/09/19 07/09/19





  04:19 04:30 05:00


 


Temperature   


 


Pulse Rate 93 H 107 H 122 H


 


Pulse Rate [   





Bilateral   





Throughout]   


 


Respiratory  26 H 44 H





Rate   


 


Respiratory   





Rate [Bilateral   





Throughout]   


 


Blood Pressure 160/101 164/91 160/101


 


O2 Sat by Pulse  99 99





Oximetry   














  07/09/19 07/09/19 07/09/19





  05:30 06:00 06:30


 


Temperature   


 


Pulse Rate 116 H 113 H 107 H


 


Pulse Rate [   





Bilateral   





Throughout]   


 


Respiratory 21 16 25 H





Rate   


 


Respiratory   





Rate [Bilateral   





Throughout]   


 


Blood Pressure 123/84 131/75 141/81


 


O2 Sat by Pulse 99 99 99





Oximetry   














  07/09/19 07/09/19 07/09/19





  07:00 07:30 08:00


 


Temperature   


 


Pulse Rate 108 H 105 H 96 H


 


Pulse Rate [   





Bilateral   





Throughout]   


 


Respiratory 20 21 18





Rate   


 


Respiratory   





Rate [Bilateral   





Throughout]   


 


Blood Pressure 149/76 152/80 137/80


 


O2 Sat by Pulse 99 99 99





Oximetry   














- General Appearance


General appearance: well-developed, well-nourished, appears stated age, 

intubated


EENT: PERRL, mucous membranes moist


Neck: no JVD, no thyromegaly, no carotid bruit, supple


Respiratory: Present: Clear to Ascultation


Cardiology: regular, normal heart rate, S1S2, no murmurs


Gastrointestinal: normoactive bowel sounds, other (incision noted in the 

hypogastric area)


Integumentary: other (1+ edema)





- Lab





                                 07/09/19 04:29





                                 07/09/19 04:29


                             Most recent lab results











Calcium  8.5 mg/dL (8.4-10.2)   07/09/19  04:29    


 


Phosphorus  2.90 mg/dL (2.5-4.5)   07/08/19  03:44    


 


Magnesium  1.80 mg/dL (1.7-2.3)   07/08/19  03:44    


 


  34.9 mg/dL (0.1-20.0)  H  07/07/19  10:19    


 


  72 mmol/L  07/07/19  10:19    














Medications & Allergies





- Medications


Allergies/Adverse Reactions: 


                                    Allergies





No Known Allergies Allergy (Verified 07/04/19 19:31)


   








Home Medications: 


                                Home Medications











 Medication  Instructions  Recorded  Confirmed  Last Taken  Type


 


No Known Home Medications [No  07/05/19 07/05/19 Unknown History





Reported Home Medications]     











Active Medications: 














Generic Name Dose Route Start Last Admin





  Trade Name Oc  PRN Reason Stop Dose Admin


 


Albuterol  2.5 mg  07/07/19 08:00  07/08/19 23:29





  Proventil  IH   2.5 mg





  Q8HRT AYAKA   Administration


 


Bisacodyl  10 mg  07/05/19 23:16 





  Dulcolax  TX  





  QDAY PRN  





  constipation unrelieved by MOM  


 


Famotidine  20 mg  07/08/19 10:00  07/08/19 09:52





  Pepcid  IV   20 mg





  DAILY AYAKA   Administration


 


Fentanyl  50 mcg  07/06/19 23:25 





  Sublimaze  IV  





  Q10MIN PRN  





  ANALGESIA  


 


Hydralazine HCl  10 mg  07/09/19 03:30  07/09/19 04:19





  Apresoline  IV   10 mg





  Q4HR PRN   Administration





  SBP > 160  


 


Hydrophilic Ointment  1 applic  07/06/19 22:12 





  Vaseline Lip Therapy  TP  





  Q2HR PRN  





  Dry Lips  


 


Oxytocin/Sodium Chloride  20 units in 1,000 mls @ 250 mls/hr  07/05/19 23:16 





  Pitocin/Ns 20 Unit/1000ml Drip  IV  





  AS DIRECT AYAKA  


 


Fentanyl Citrate  2,000 mcg in 100 mls @ 3.856 mls/hr  07/06/19 23:45  07/09/19 

06:20





  Fentanyl Drip Premix  IV   0 mcg/kg/hr





  TITR AYAKA   0 mls/hr





    Titration





  Protocol  





  1 MCG/KG/HR  


 


Midazolam HCl 100 mg/ Sodium  100 mls @ 2 mls/hr  07/06/19 23:45  07/08/19 19:12





  Chloride  IV   0 mg/hr





  TITR AYAKA   0 mls/hr





    Titration





  Protocol  





  2 MG/HR  


 


Cefepime HCl  2 gm in 100 mls @ 200 mls/hr  07/08/19 15:00  07/08/19 15:20





  Maxipime/Ns 2 Gm/100 Ml  IV   200 mls/hr





  Q24H AYAKA   Administration





  Protocol  


 


Magnesium Hydroxide  30 ml  07/05/19 23:16 





  Milk Of Magnesia  PO  





  Q4H PRN  





  Constipation  


 


Metoclopramide HCl  10 mg  07/05/19 23:16  07/09/19 08:58





  Reglan  IV   10 mg





  Q6H PRN   Administration





  Nausea And Vomiting  


 


Midazolam HCl  2 mg  07/06/19 23:25 





  Versed  IV  





  Q10MIN PRN  





  Sedation  


 


Morphine Sulfate  2 mg  07/06/19 23:19  07/08/19 17:24





  Morphine  IV   2 mg





  Q4H PRN   Administration





  Pain, Moderate (4-6)  


 


Multi-Ingred Cream/Lotion/Oil/Oint  1 applic  07/06/19 22:12 





  Artificial Tears Ophth Oint  OU  





  Q4HR PRN  





  Dry Eye(s)  


 


Multi-Ingredient Ointment  1 applic  07/05/19 23:16 





  Lansinoh  TP  





  PRN PRN  





  dryness/cracking  


 


Naloxone HCl  0.1 mg  07/05/19 23:16 





  Narcan 0.4 Mg/1 Ml  IV  





  Q2MIN PRN  





  Res Rate </= 8 or 02 SAT < 92%  


 


Potassium Chloride  40 meq  07/09/19 10:00 





  Potassium Chloride  FEEDTUBE  





  QDAY AYAKA  


 


Sodium Chloride  10 ml  07/05/19 23:16  07/08/19 21:45





  Sodium Chloride Flush Syringe 10 Ml  IV   10 ml





  BID AYAKA   Administration


 


Sodium Chloride  10 ml  07/05/19 23:16 





  Sodium Chloride Flush Syringe 10 Ml  IV  





  PRN PRN  





  LINE FLUSH  


 


Witch Hazel/Glycerin  1 each  07/05/19 23:16 





  Tucks Pad  TP  





  PRN PRN  





  Hemorrhoids/cleansing/soothing

## 2019-07-09 NOTE — PROGRESS NOTE
Assessment and Plan





S/P 


Acute Pulmonary edema


Acute renal failure


Acute respiratory failure


Elevated troponin


Pre-eclampsia





Echo reports a normal left ventricular size and function LVEF 45-50%.





Supportive cardiac management.





Subjective


Date of service: 19


Principal diagnosis: 1) POD #4 S/P 1LTCS  2)gestatinal hypertension 3) acute 

resp failur


Interval history: 





Alert but remains intubated on the vent.





Objective


                                   Vital Signs











  Temp Pulse Pulse Pulse Resp Resp BP


 


 19 12:00  98.7 F  112 H   97 H  18   140/84


 


 19 11:30   114 H    17   138/77


 


 19 11:00   104 H    16   144/78


 


 19 10:30   104 H    18   144/78


 


 19 10:00   122 H    14   140/80


 


 19 09:30   124 H    23   124/74


 


 19 09:27   117 H    18   140/80


 


 19 09:00   124 H    28 H   130/79


 


 19 08:30   113 H    19   130/79


 


 19 08:00  98.8 F  96 H   110 H  25 H   137/80


 


 19 07:30   105 H    21   152/80


 


 19 07:00   108 H    20   149/76


 


 19 06:30   107 H    25 H   141/81


 


 19 06:00   113 H    16   131/75


 


 19 05:30   116 H    21   123/84


 


 19 05:00   122 H    44 H   160/101


 


 19 04:30   107 H    26 H   164/91


 


 19 04:19   93 H      160/101


 


 19 04:00   98 H    24   168/102


 


 19 03:57   95 H      175/102


 


 19 03:43  98.9 F      


 


 19 03:30   88    36 H   175/102


 


 19 03:00   112 H    47 H   168/101


 


 19 02:00   87    19   168/101


 


 19 01:00   90    35 H   167/94


 


 19 00:00   99 H    15   149/87


 


 19 23:44    94 H    25 H 


 


 19 23:37  99.8 F H      


 


 19 23:29    92 H    25 H 


 


 19 23:27   92 H      149/87


 


 19 23:00   86    25 H   149/87


 


 19 22:00   91 H    24   150/94


 


 19 21:18   95 H    25 H   125/86


 


 19 21:00   108 H    25 H   125/86


 


 19 20:59   105 H    25 H   159/97


 


 19 20:21   113 H      159/97


 


 19 20:00   98 H    25 H   159/97


 


 19 19:59   103 H     


 


 19 19:45  99.5 F      


 


 19 19:00   100 H    25 H   146/94


 


 19 18:45   96 H    22   154/93


 


 19 18:30   106 H    25 H   154/93


 


 19 18:16   106 H    23   154/93


 


 19 18:00   100 H    21   154/93


 


 19 17:46   107 H    22   147/88


 


 19 17:30   110 H    26 H   152/102


 


 19 17:16   121 H    20   183/120


 


 19 17:08   122 H      158/101


 


 19 17:03    122 H    24 


 


 19 17:00   125 H    25 H   165/99


 


 19 16:53    131 H    20 


 


 19 16:46   107 H    19   165/99


 


 19 16:30   107 H    26 H   165/99


 


 19 16:16   107 H    20   165/99


 


 19 16:00  99.3 F  114 H   106 H  21   165/99


 


 19 15:46   108 H    20   153/90


 


 19 15:30   118 H    22   153/90


 


 19 15:16   107 H    23   153/88


 


 19 15:00   109 H    22   153/88


 


 19 14:46   101 H    22   153/90


 


 19 14:30   118 H    20   153/90


 


 19 14:16   104 H    23   153/90


 


 19 14:00   105 H    23   153/90


 


 19 13:46   107 H    25 H   154/93


 


 19 13:30   103 H    23   154/93


 


 19 13:16   106 H    21   154/93














  Pulse Ox


 


 19 12:00  99


 


 19 11:30  99


 


 19 11:00  99


 


 19 10:30  99


 


 19 10:00  99


 


 19 09:30  98


 


 19 09:27  99


 


 19 09:00  98


 


 19 08:30  99


 


 19 08:00  99


 


 19 07:30  99


 


 19 07:00  99


 


 19 06:30  99


 


 19 06:00  99


 


 19 05:30  99


 


 19 05:00  99


 


 19 04:30  99


 


 19 04:19 


 


 19 04:00  100


 


 19 03:57  99


 


 19 03:43 


 


 19 03:30  99


 


 19 03:00  100


 


 19 02:00  100


 


 19 01:00  100


 


 19 00:00  99


 


 19 23:44 


 


 19 23:37 


 


 19 23:29 


 


 19 23:27  100


 


 19 23:00  100


 


 19 22:00  100


 


 19 21:18  99


 


 19 21:00  98


 


 19 20:59  98


 


 19 20:21  99


 


 19 20:00  100


 


 19 19:59 


 


 19 19:45 


 


 19 19:00  99


 


 19 18:45  98


 


 19 18:30  98


 


 19 18:16  99


 


 19 18:00  99


 


 19 17:46  98


 


 19 17:30  100


 


 19 17:16  97


 


 19 17:08  100


 


 19 17:03 


 


 19 17:00  96


 


 19 16:53 


 


 19 16:46  99


 


 07/08/19 16:30  100


 


 19 16:16  99


 


 19 16:00  99


 


 19 15:46  98


 


 19 15:30  99


 


 19 15:16  99


 


 19 15:00  99


 


 19 14:46  99


 


 19 14:30  98


 


 19 14:16  99


 


 19 14:00  99


 


 19 13:46  99


 


 19 13:30  98


 


 19 13:16  99














- Physical Examination


General: Other (Intubated on mechanical ventilator)


HEENT: Positive: PERRL


Neck: Positive: trachea midline


Cardiac: Positive: Reg Rate and Rhythm


Neuro: Positive: Grossly Intact


Abdomen: Positive: Other (post C/S surgery)


Extremities: Absent: edema





- Labs and Meds


                                 Cardiac Enzymes











  19 Range/Units





  04:29 


 


AST  82 H  (5-40)  units/L








                                   Coagulation











  19 Range/Units





  14:24 


 


PT  13.2  (12.2-14.9)  Sec.


 


INR  1.03  (0.87-1.13)  


 


APTT  21.7 L  (24.2-36.6)  Sec.








                                       CBC











  19 Range/Units





  04:29 


 


WBC  17.7 H  (4.5-11.0)  K/mm3


 


RBC  3.77  (3.65-5.03)  M/mm3


 


Hgb  11.7  (10.1-14.3)  gm/dl


 


Hct  34.1  (30.3-42.9)  %


 


Plt Count  158  (140-440)  K/mm3


 


Lymph #  1.0 L  (1.2-5.4)  K/mm3


 


Mono #  1.4 H  (0.0-0.8)  K/mm3


 


Eos #  0.0  (0.0-0.4)  K/mm3


 


Baso #  0.1  (0.0-0.1)  K/mm3








                          Comprehensive Metabolic Panel











  19 Range/Units





  04:29 


 


Sodium  146 H D  (137-145)  mmol/L


 


Potassium  2.8 L*  (3.6-5.0)  mmol/L


 


Chloride  108.9 H  ()  mmol/L


 


Carbon Dioxide  17 L  (22-30)  mmol/L


 


BUN  33 H  (7-17)  mg/dL


 


Creatinine  2.3 H  (0.7-1.2)  mg/dL


 


Glucose  146 H  ()  mg/dL


 


Calcium  8.5  (8.4-10.2)  mg/dL


 


AST  82 H  (5-40)  units/L


 


ALT  83 H  (7-56)  units/L


 


Alkaline Phosphatase  122  ()  units/L


 


Total Protein  6.2 L  (6.3-8.2)  g/dL


 


Albumin  2.6 L  (3.9-5)  g/dL

## 2019-07-09 NOTE — PROGRESS NOTE
Assessment and Plan


Cultures:


Blood cultures 2019 no growth. 


Urine culture 2019 no growth.  





Assessment: 


39 y/o female with history of preeclampsia admitted on 2019 with 38 weeks 

gestation with uterine contractions. Patient underwent  on 19 due 

to failure to dilate, pulmonary edema and gestational hypertension;  after C-

section she developed acute respiratory failure/ARDS and hypotension, now 

intubated: 





1) Leukocytosis: likely reactive from labor/ and respiratory failure. I

doubt bacterial infection at this time. Initial UA was negative for UTI, repeat 

with mild pyuria. I doubt this is the reason. CXR Chest x-ray showed mild 

cardiomegaly and pulmonary edema. No consolidations. Repeat appears better. CRP:

14.6. DVT scan negative.





2) Acute respiratory failure: CXR with pulmonary edema likely from pre-

emclapsia. Doubt pneumonia. No recent vomiting, doubt aspiration.  





3) NAZARIO: renal on board. 





Recommendations:


- d/marcus Cefepime


- monitor off abx


- WBC is likely reactive








Bronwyn Burr MD, FACP


[facility] ([facility])


C: [phone]


O: [phone]


F: [phone]








Subjective


Date of service: 19


Principal diagnosis: 1) POD #4 S/P 1LTCS  2)gestatinal hypertension 3) acute 

resp failur


Interval history: 





No fever. Remains on the vent, on SBT trial. Hopefully to be extubated soon. 





Objective





- Exam


Narrative Exam: 





Physical Exam: 


Constitutional: awake, intubated


Head, Ears, Nose: Normocephalic, atraumatic. External ears, nose normal


Eyes: Conjunctivae/corneas clear. No icterus. No ptosis.


Neck: Supple, no meningeal signs


Oral: intubated 


Cardiovascular: S1, S2 normal. 


Respiratory: Good air entry, clear to auscultation bilaterally


GI: enlarged uterus, non tender; bowel sounds normal. No peritoneal signs. 

Surgical incision c/d/i


Musculoskeletal: No pedal edema, no cyanosis.


Skin: No rash or abscess


Hem/Lymphatic: No palpable cervical or supraclavicular nodes. No lymphangitis


Psych: no agitation


Neurological: awake, intubated, on vent





- Constitutional


Vitals: 


                                   Vital Signs











Temp Pulse Resp BP Pulse Ox


 


 98.7 F   108 H  17   177/111   99 


 


 19 12:00  19 14:27  19 13:00  19 14:27  19 13:00








                           Temperature -Last 24 Hours











Temperature                    98.7 F


 


Temperature                    98.8 F


 


Temperature                    98.9 F


 


Temperature                    99.8 F


 


Temperature                    99.5 F


 


Temperature                    99.3 F

















- Labs


CBC & Chem 7: 


                                 19 04:29





                                 19 04:29


Labs: 


                              Abnormal lab results











  19 Range/Units





  14:24 18:05 23:41 


 


WBC     (4.5-11.0)  K/mm3


 


RDW     (13.2-15.2)  %


 


Lymph % (Auto)     (13.4-35.0)  %


 


Mono % (Auto)     (0.0-7.3)  %


 


Lymph #     (1.2-5.4)  K/mm3


 


Mono #     (0.0-0.8)  K/mm3


 


Seg Neutrophils %     (40.0-70.0)  %


 


Seg Neutrophils #     (1.8-7.7)  K/mm3


 


APTT  21.7 L    (24.2-36.6)  Sec.


 


Fibrinogen  586 H    (211-480)  mg/dl


 


D-Dimer  > 73763 H    (0-234)  ng/mlDDU


 


POC ABG pCO2     (35-45)  


 


POC ABG pO2     ()  


 


Sodium     (137-145)  mmol/L


 


Potassium     (3.6-5.0)  mmol/L


 


Chloride     ()  mmol/L


 


Carbon Dioxide     (22-30)  mmol/L


 


BUN     (7-17)  mg/dL


 


Creatinine     (0.7-1.2)  mg/dL


 


Glucose     ()  mg/dL


 


POC Glucose   168 H  149 H  ()  


 


AST     (5-40)  units/L


 


ALT     (7-56)  units/L


 


Total Protein     (6.3-8.2)  g/dL


 


Albumin     (3.9-5)  g/dL














  19 Range/Units





  04:13 04:29 04:29 


 


WBC   17.7 H   (4.5-11.0)  K/mm3


 


RDW   17.3 H   (13.2-15.2)  %


 


Lymph % (Auto)   5.9 L   (13.4-35.0)  %


 


Mono % (Auto)   8.2 H   (0.0-7.3)  %


 


Lymph #   1.0 L   (1.2-5.4)  K/mm3


 


Mono #   1.4 H   (0.0-0.8)  K/mm3


 


Seg Neutrophils %   85.6 H   (40.0-70.0)  %


 


Seg Neutrophils #   15.2 H   (1.8-7.7)  K/mm3


 


APTT     (24.2-36.6)  Sec.


 


Fibrinogen     (211-480)  mg/dl


 


D-Dimer     (0-234)  ng/mlDDU


 


POC ABG pCO2  < 30 L    (35-45)  


 


POC ABG pO2  122 H    ()  


 


Sodium    146 H D  (137-145)  mmol/L


 


Potassium    2.8 L*  (3.6-5.0)  mmol/L


 


Chloride    108.9 H  ()  mmol/L


 


Carbon Dioxide    17 L  (22-30)  mmol/L


 


BUN    33 H  (7-17)  mg/dL


 


Creatinine    2.3 H  (0.7-1.2)  mg/dL


 


Glucose    146 H  ()  mg/dL


 


POC Glucose     ()  


 


AST    82 H  (5-40)  units/L


 


ALT    83 H  (7-56)  units/L


 


Total Protein    6.2 L  (6.3-8.2)  g/dL


 


Albumin    2.6 L  (3.9-5)  g/dL














  19 Range/Units





  05:26 12:33 12:57 


 


WBC     (4.5-11.0)  K/mm3


 


RDW     (13.2-15.2)  %


 


Lymph % (Auto)     (13.4-35.0)  %


 


Mono % (Auto)     (0.0-7.3)  %


 


Lymph #     (1.2-5.4)  K/mm3


 


Mono #     (0.0-0.8)  K/mm3


 


Seg Neutrophils %     (40.0-70.0)  %


 


Seg Neutrophils #     (1.8-7.7)  K/mm3


 


APTT     (24.2-36.6)  Sec.


 


Fibrinogen     (211-480)  mg/dl


 


D-Dimer     (0-234)  ng/mlDDU


 


POC ABG pCO2    30.6 L  (35-45)  


 


POC ABG pO2    125 H  ()  


 


Sodium     (137-145)  mmol/L


 


Potassium     (3.6-5.0)  mmol/L


 


Chloride     ()  mmol/L


 


Carbon Dioxide     (22-30)  mmol/L


 


BUN     (7-17)  mg/dL


 


Creatinine     (0.7-1.2)  mg/dL


 


Glucose     ()  mg/dL


 


POC Glucose  138 H  140 H   ()  


 


AST     (5-40)  units/L


 


ALT     (7-56)  units/L


 


Total Protein     (6.3-8.2)  g/dL


 


Albumin     (3.9-5)  g/dL














- Imaging and cardiology


Chest x-ray: report reviewed, image reviewed (pulmonary edema much improved)

## 2019-07-09 NOTE — PROGRESS NOTE
Assessment and Plan


Acute resp failure s/p intubated , placed on vent 


- likely ARDS with Pulmonary edema- noncardiogenic, pulmonary following


- Patient was given Lasix


- plan for extubation today





Nausea/vomiting with abdominal distension


- NPO, NG suction following extubation, serial abdominal xry





Labile blood pressure


Sinus tachycardia


s/p  on 19


GBS positive


History of herpes simplex 2


History of preeclampsia


NAZARIO probably ATN from hypotension


Leukocytosis





- Continue supportive care


Nephrology following, monitor BMP


Routine pulm toileting


Cardiology following


Pulmonology following


On Empiric Cefepime , ID following


DVT PPX on Lovenox








Brief History:


38-year-old  female who is s/p  on 19.  Patient was 

transferred to [facility] for management of pulmonary edema.  Chest x-ray showed mild 

cardiomegaly and pulmonary edema. She was given lasix. On night of , worse 

respiratory distress, rapid response team called and she was intubated put on 

vent. Also now has NAZARIO, managed by nephrology. Pulmonary edema thought to be non

cardiogenic, likely ARDS





Hospitalist Physical


Gen: Not in acute distress, lying in bed, intubated,sedated 


HEENT: Normocephalic, atraumatic


Neck: supple, no JVD


Heart: S1 and S2 reg, no murmurs, rubs or gallop


Lungs: Bilateral crackles, no wheeze


Abd: soft, non tender, non distended, normal BS


Ext: No edema, no clubbing, no cyanosis, 


Neuro: Intubated, sedated. arouseable, follows commands, moves all ext











Subjective


Date of service: 19


Principal diagnosis: 1) POD #4 S/P 1LTCS  2)gestatinal hypertension 3) acute 

resp failur


Interval history: 





Patient seen and examined


c/o Nausea and had 2 episodes of vomiting this am


ordered abdominal xry


updated family at bedside


plan for extubation today











Objective





- Constitutional


Vitals: 


                               Vital Signs - 12hr











  19





  03:00 03:30 03:43


 


Temperature   98.9 F


 


Pulse Rate 112 H 88 


 


Pulse Rate [   





From Monitor]   


 


Respiratory 47 H 36 H 





Rate   


 


Blood Pressure 168/101 175/102 


 


O2 Sat by Pulse 100 99 





Oximetry   














  19





  03:57 04:00 04:19


 


Temperature   


 


Pulse Rate 95 H 98 H 93 H


 


Pulse Rate [   





From Monitor]   


 


Respiratory  24 





Rate   


 


Blood Pressure 175/102 168/102 160/101


 


O2 Sat by Pulse 99 100 





Oximetry   














  19





  04:30 05:00 05:30


 


Temperature   


 


Pulse Rate 107 H 122 H 116 H


 


Pulse Rate [   





From Monitor]   


 


Respiratory 26 H 44 H 21





Rate   


 


Blood Pressure 164/91 160/101 123/84


 


O2 Sat by Pulse 99 99 99





Oximetry   














  19





  06:00 06:30 07:00


 


Temperature   


 


Pulse Rate 113 H 107 H 108 H


 


Pulse Rate [   





From Monitor]   


 


Respiratory 16 25 H 20





Rate   


 


Blood Pressure 131/75 141/81 149/76


 


O2 Sat by Pulse 99 99 99





Oximetry   














  19





  07:30 08:00 08:30


 


Temperature  98.8 F 


 


Pulse Rate 105 H 96 H 113 H


 


Pulse Rate [  110 H 





From Monitor]   


 


Respiratory 21 25 H 19





Rate   


 


Blood Pressure 152/80 137/80 130/79


 


O2 Sat by Pulse 99 99 99





Oximetry   














  19





  09:00 09:27 09:30


 


Temperature   


 


Pulse Rate 124 H 117 H 124 H


 


Pulse Rate [   





From Monitor]   


 


Respiratory 28 H 18 23





Rate   


 


Blood Pressure 130/79 140/80 124/74


 


O2 Sat by Pulse 98 99 98





Oximetry   














  19





  10:00 10:30 11:00


 


Temperature   


 


Pulse Rate 122 H 104 H 104 H


 


Pulse Rate [   





From Monitor]   


 


Respiratory 14 18 16





Rate   


 


Blood Pressure 140/80 144/78 144/78


 


O2 Sat by Pulse 99 99 99





Oximetry   














  19





  11:30 12:00 12:30


 


Temperature  98.7 F 


 


Pulse Rate 114 H 112 H 110 H


 


Pulse Rate [  97 H 





From Monitor]   


 


Respiratory 17 18 19





Rate   


 


Blood Pressure 138/77 140/84 128/95


 


O2 Sat by Pulse 99 99 99





Oximetry   














  19





  13:00 14:27


 


Temperature  


 


Pulse Rate 102 H 108 H


 


Pulse Rate [  





From Monitor]  


 


Respiratory 17 





Rate  


 


Blood Pressure 128/95 177/111


 


O2 Sat by Pulse 99 





Oximetry  














- Labs


CBC & Chem 7: 


                                 19 04:29





                                 07/10/19 03:55


Labs: 


                              Abnormal lab results











  19 Range/Units





  14:24 18:05 23:41 


 


WBC     (4.5-11.0)  K/mm3


 


RDW     (13.2-15.2)  %


 


Lymph % (Auto)     (13.4-35.0)  %


 


Mono % (Auto)     (0.0-7.3)  %


 


Lymph #     (1.2-5.4)  K/mm3


 


Mono #     (0.0-0.8)  K/mm3


 


Seg Neutrophils %     (40.0-70.0)  %


 


Seg Neutrophils #     (1.8-7.7)  K/mm3


 


APTT  21.7 L    (24.2-36.6)  Sec.


 


Fibrinogen  586 H    (211-480)  mg/dl


 


D-Dimer  > 00619 H    (0-234)  ng/mlDDU


 


POC ABG pCO2     (35-45)  


 


POC ABG pO2     ()  


 


Sodium     (137-145)  mmol/L


 


Potassium     (3.6-5.0)  mmol/L


 


Chloride     ()  mmol/L


 


Carbon Dioxide     (22-30)  mmol/L


 


BUN     (7-17)  mg/dL


 


Creatinine     (0.7-1.2)  mg/dL


 


Glucose     ()  mg/dL


 


POC Glucose   168 H  149 H  ()  


 


AST     (5-40)  units/L


 


ALT     (7-56)  units/L


 


Total Protein     (6.3-8.2)  g/dL


 


Albumin     (3.9-5)  g/dL














  19 Range/Units





  04:13 04:29 04:29 


 


WBC   17.7 H   (4.5-11.0)  K/mm3


 


RDW   17.3 H   (13.2-15.2)  %


 


Lymph % (Auto)   5.9 L   (13.4-35.0)  %


 


Mono % (Auto)   8.2 H   (0.0-7.3)  %


 


Lymph #   1.0 L   (1.2-5.4)  K/mm3


 


Mono #   1.4 H   (0.0-0.8)  K/mm3


 


Seg Neutrophils %   85.6 H   (40.0-70.0)  %


 


Seg Neutrophils #   15.2 H   (1.8-7.7)  K/mm3


 


APTT     (24.2-36.6)  Sec.


 


Fibrinogen     (211-480)  mg/dl


 


D-Dimer     (0-234)  ng/mlDDU


 


POC ABG pCO2  < 30 L    (35-45)  


 


POC ABG pO2  122 H    ()  


 


Sodium    146 H D  (137-145)  mmol/L


 


Potassium    2.8 L*  (3.6-5.0)  mmol/L


 


Chloride    108.9 H  ()  mmol/L


 


Carbon Dioxide    17 L  (22-30)  mmol/L


 


BUN    33 H  (7-17)  mg/dL


 


Creatinine    2.3 H  (0.7-1.2)  mg/dL


 


Glucose    146 H  ()  mg/dL


 


POC Glucose     ()  


 


AST    82 H  (5-40)  units/L


 


ALT    83 H  (7-56)  units/L


 


Total Protein    6.2 L  (6.3-8.2)  g/dL


 


Albumin    2.6 L  (3.9-5)  g/dL














  19 Range/Units





  05:26 12:33 12:57 


 


WBC     (4.5-11.0)  K/mm3


 


RDW     (13.2-15.2)  %


 


Lymph % (Auto)     (13.4-35.0)  %


 


Mono % (Auto)     (0.0-7.3)  %


 


Lymph #     (1.2-5.4)  K/mm3


 


Mono #     (0.0-0.8)  K/mm3


 


Seg Neutrophils %     (40.0-70.0)  %


 


Seg Neutrophils #     (1.8-7.7)  K/mm3


 


APTT     (24.2-36.6)  Sec.


 


Fibrinogen     (211-480)  mg/dl


 


D-Dimer     (0-234)  ng/mlDDU


 


POC ABG pCO2    30.6 L  (35-45)  


 


POC ABG pO2    125 H  ()  


 


Sodium     (137-145)  mmol/L


 


Potassium     (3.6-5.0)  mmol/L


 


Chloride     ()  mmol/L


 


Carbon Dioxide     (22-30)  mmol/L


 


BUN     (7-17)  mg/dL


 


Creatinine     (0.7-1.2)  mg/dL


 


Glucose     ()  mg/dL


 


POC Glucose  138 H  140 H   ()  


 


AST     (5-40)  units/L


 


ALT     (7-56)  units/L


 


Total Protein     (6.3-8.2)  g/dL


 


Albumin     (3.9-5)  g/dL

## 2019-07-09 NOTE — XRAY REPORT
Abdomen AP portable supine 0958



INDICATION: Vomiting



The right lateral abdomen is not fully included. A nasogastric tube coils in this proximal stomach. C
olon is distended with gas though not dilated. Moderate small bowel gaseous distention is seen with m
ild dilatation noted.



IMPRESSION: The bowel gas pattern suggests a functional abnormality such as adynamic ileus though giv
en some small bowel dilatation I cannot include the possibility of mechanical obstruction. I would bhatt
ggest follow-up and clinical correlation.



Signer Name: Matthew Patel MD 

Signed: 7/9/2019 10:13 AM

 Workstation Name: [id number]

## 2019-07-09 NOTE — EVENT NOTE
Date: 07/09/19





Patient resting in bed, extubated, alert and appropriately responsive. 

Requesting ice chip, states BM x2 today. No further vomiting. Abd soft, dis

tension improved. Hold with advancing diet for tonight.

## 2019-07-10 LAB
ALBUMIN SERPL-MCNC: 2.8 G/DL (ref 3.9–5)
ALT SERPL-CCNC: 175 UNITS/L (ref 7–56)
BILIRUB DIRECT SERPL-MCNC: 0.4 MG/DL (ref 0–0.2)
BUN SERPL-MCNC: 31 MG/DL (ref 7–17)
BUN/CREAT SERPL: 19 %
CALCIUM SERPL-MCNC: 8.4 MG/DL (ref 8.4–10.2)
HEMOLYSIS INDEX: 1

## 2019-07-10 RX ADMIN — Medication SCH: at 13:22

## 2019-07-10 RX ADMIN — PANTOPRAZOLE SODIUM SCH MG: 40 INJECTION, POWDER, FOR SOLUTION INTRAVENOUS at 22:25

## 2019-07-10 RX ADMIN — PANTOPRAZOLE SODIUM SCH MG: 40 INJECTION, POWDER, FOR SOLUTION INTRAVENOUS at 09:49

## 2019-07-10 RX ADMIN — DEXTROSE SCH MLS/HR: 5 SOLUTION INTRAVENOUS at 14:35

## 2019-07-10 RX ADMIN — ALBUTEROL SULFATE SCH: 2.5 SOLUTION RESPIRATORY (INHALATION) at 00:14

## 2019-07-10 RX ADMIN — METOCLOPRAMIDE SCH MG: 5 INJECTION, SOLUTION INTRAMUSCULAR; INTRAVENOUS at 05:58

## 2019-07-10 NOTE — PROGRESS NOTE
Assessment and Plan


Acute resp failure s/p intubated , placed on vent 


- likely ARDS with Pulmonary edema- noncardiogenic, pulmonary following


- Patient was given Lasix


- s/p extubation on 





Nausea/vomiting with abdominal distension


- NPO, NG suction, serial abdominal xry - shows distended bowels w/o free air?


- ordered CT abdomen with oral contrast today





Labile blood pressure


Sinus tachycardia


s/p  on 19


GBS positive


History of herpes simplex 2


History of preeclampsia


NAZARIO probably ATN from hypotension


Leukocytosis





- Continue supportive care


Nephrology following, monitor BMP


Routine pulm toileting


Cardiology following


Pulmonology following


On Empiric Cefepime , ID following


DVT PPX on Lovenox








Brief History:


38-year-old  female who is s/p  on 19.  Patient was 

transferred to [facility] for management of pulmonary edema.  Chest x-ray showed mild 

cardiomegaly and pulmonary edema. She was given lasix. On night of , worse 

respiratory distress, rapid response team called and she was intubated put on 

vent. Also now has NAZARIO, managed by nephrology. Pulmonary edema thought to be non

cardiogenic, likely ARDS





Hospitalist Physical


Gen: Not in acute distress, lying in bed, 


HEENT: Normocephalic, atraumatic


Neck: supple, no JVD


Heart: S1 and S2 reg, no murmurs, rubs or gallop


Lungs: no Bilateral crackles, no wheeze


Abd: soft, non tender, non distended, normal BS


Ext: No edema, no clubbing, no cyanosis, 


Neuro: arouseable, follows commands, moves all ext











Subjective


Date of service: 07/10/19


Principal diagnosis: 1) POD #4 S/P 1LTCS  2)gestatinal hypertension 3) acute 

resp failur


Interval history: 





Patient seen and examined


c/o Nausea but no vomiting


had BM, but small volume and abdomen still distended


ordered abdominal CT scan, persistent colon distension on xrys


updated family at bedside











Objective





- Constitutional


Vitals: 


                               Vital Signs - 12hr











  07/10/19 07/10/19 07/10/19





  04:00 04:30 05:00


 


Temperature 98.3 F  


 


Pulse Rate 101 H 105 H 104 H


 


Pulse Rate [ 96 H  





From Monitor]   


 


Respiratory 25 H 14 17





Rate   


 


Blood Pressure 166/97 167/86 158/85


 


O2 Sat by Pulse 100 100 100





Oximetry   














  07/10/19 07/10/19 07/10/19





  05:30 06:00 06:30


 


Temperature   


 


Pulse Rate 102 H 97 H 107 H


 


Pulse Rate [   





From Monitor]   


 


Respiratory 21 21 20





Rate   


 


Blood Pressure 148/83 156/81 168/107


 


O2 Sat by Pulse 100 100 100





Oximetry   














  07/10/19 07/10/19 07/10/19





  07:00 07:30 08:00


 


Temperature   98.1 F


 


Pulse Rate 106 H 97 H 100 H


 


Pulse Rate [   





From Monitor]   


 


Respiratory 19 18 25 H





Rate   


 


Blood Pressure 167/105 166/106 164/84


 


O2 Sat by Pulse 100 100 100





Oximetry   














  07/10/19 07/10/19 07/10/19





  08:30 09:00 09:30


 


Temperature   


 


Pulse Rate 95 H 100 H 103 H


 


Pulse Rate [   





From Monitor]   


 


Respiratory 15 24 26 H





Rate   


 


Blood Pressure 163/83 164/88 167/91


 


O2 Sat by Pulse 100 100 100





Oximetry   














  07/10/19 07/10/19 07/10/19





  10:00 10:30 11:00


 


Temperature   


 


Pulse Rate 96 H 98 H 85


 


Pulse Rate [   





From Monitor]   


 


Respiratory 20 18 13





Rate   


 


Blood Pressure 164/88 151/88 148/83


 


O2 Sat by Pulse 99 100 100





Oximetry   














  07/10/19 07/10/19 07/10/19





  11:30 11:55 12:00


 


Temperature  98.4 F 


 


Pulse Rate 86  89


 


Pulse Rate [   





From Monitor]   


 


Respiratory 22  19





Rate   


 


Blood Pressure 165/89  169/96


 


O2 Sat by Pulse 100  100





Oximetry   














  07/10/19 07/10/19 07/10/19





  12:30 13:00 15:39


 


Temperature   98.6 F


 


Pulse Rate 77 84 


 


Pulse Rate [   





From Monitor]   


 


Respiratory 20 22 





Rate   


 


Blood Pressure 146/75 164/86 


 


O2 Sat by Pulse 100 100 





Oximetry   














- Labs


CBC & Chem 7: 


                                 19 04:09





                                 19 04:09


Labs: 


                              Abnormal lab results











  19 Range/Units





  18:22 19:02 19:02 


 


Sodium     (137-145)  mmol/L


 


Potassium   3.5 L D  3.5 L  (3.6-5.0)  mmol/L


 


Chloride   111.4 H   ()  mmol/L


 


Carbon Dioxide   17 L   (22-30)  mmol/L


 


BUN   32 H   (7-17)  mg/dL


 


Creatinine   1.7 H   (0.7-1.2)  mg/dL


 


Glucose   156 H   ()  mg/dL


 


POC Glucose  156 H    ()  


 


Phosphorus     (2.5-4.5)  mg/dL


 


Magnesium     (1.7-2.3)  mg/dL


 


Direct Bilirubin     (0-0.2)  mg/dL


 


AST     (5-40)  units/L


 


ALT     (7-56)  units/L


 


Total Protein     (6.3-8.2)  g/dL


 


Albumin     (3.9-5)  g/dL


 


Lipase     (13-60)  units/L














  07/10/19 07/10/19 07/10/19 Range/Units





  03:55 03:55 03:55 


 


Sodium  150 H    (137-145)  mmol/L


 


Potassium  3.1 L    (3.6-5.0)  mmol/L


 


Chloride  115.2 H    ()  mmol/L


 


Carbon Dioxide  20 L    (22-30)  mmol/L


 


BUN  31 H    (7-17)  mg/dL


 


Creatinine  1.6 H    (0.7-1.2)  mg/dL


 


Glucose  139 H    ()  mg/dL


 


POC Glucose     ()  


 


Phosphorus    2.20 L  (2.5-4.5)  mg/dL


 


Magnesium    2.40 H  (1.7-2.3)  mg/dL


 


Direct Bilirubin   0.4 H   (0-0.2)  mg/dL


 


AST   120 H   (5-40)  units/L


 


ALT   175 H   (7-56)  units/L


 


Total Protein   5.6 L   (6.3-8.2)  g/dL


 


Albumin   2.8 L   (3.9-5)  g/dL


 


Lipase   170 H   (13-60)  units/L














  07/10/19 07/10/19 Range/Units





  06:04 11:34 


 


Sodium    (137-145)  mmol/L


 


Potassium    (3.6-5.0)  mmol/L


 


Chloride    ()  mmol/L


 


Carbon Dioxide    (22-30)  mmol/L


 


BUN    (7-17)  mg/dL


 


Creatinine    (0.7-1.2)  mg/dL


 


Glucose    ()  mg/dL


 


POC Glucose  125 H  139 H  ()  


 


Phosphorus    (2.5-4.5)  mg/dL


 


Magnesium    (1.7-2.3)  mg/dL


 


Direct Bilirubin    (0-0.2)  mg/dL


 


AST    (5-40)  units/L


 


ALT    (7-56)  units/L


 


Total Protein    (6.3-8.2)  g/dL


 


Albumin    (3.9-5)  g/dL


 


Lipase    (13-60)  units/L

## 2019-07-10 NOTE — PROGRESS NOTE
Assessment and Plan


Cultures:


Blood cultures 2019 no growth. 


Urine culture 2019 no growth.  


Resp culture: no growth





Assessment: 


39 y/o female with history of preeclampsia admitted on 2019 with 38 weeks 

gestation with uterine contractions. Patient underwent  on 19 due 

to failure to dilate, pulmonary edema and gestational hypertension;  after C-

section she developed acute respiratory failure/ARDS and hypotension, now 

intubated: 





1) Leukocytosis: likely reactive from labor/ and respiratory failure. 

No evidence of infectious process thus far. CXR Chest x-ray showed mild 

cardiomegaly and pulmonary edema. No consolidations. Repeat appears better. CRP:

14.6. DVT scan negative.





2) Acute respiratory failure: CXR with pulmonary edema likely from pre-

emclapsia. Doubt pneumonia. No recent vomiting, doubt aspiration.  





3) NAZARIO: renal on board. Improving creatinine.





4) ?ileus: GYN following.





Recommendations:


- continue to monitor off abx


- recheck WBC in AM (ordered)








Bronwyn Burr MD, FACP


[facility] ([facility])


C: [phone]


O: [phone]


F: [phone]








Subjective


Date of service: 07/10/19


Principal diagnosis: 1) POD #4 S/P 1LTCS  2)gestatinal hypertension 3) acute 

resp failur


Interval history: 





Extubated. Doing well. No fever. Asking for ice chips. 





Objective





- Exam


Narrative Exam: 





Physical Exam: 


Constitutional: awake, on ventimask


Head, Ears, Nose: Normocephalic, atraumatic. External ears, nose normal


Eyes: Conjunctivae/corneas clear. No icterus. No ptosis.


Neck: Supple, no meningeal signs


Cardiovascular: S1, S2 normal. 


Respiratory: Good air entry, clear to auscultation bilaterally


GI: soft, mild tenderness; bowel sounds normal. No peritoneal signs. Surgical 

incision c/d/i


Musculoskeletal: No pedal edema, no cyanosis.


Skin: No rash or abscess


Hem/Lymphatic: No palpable cervical or supraclavicular nodes. No lymphangitis


Psych: calm. 


Neurological: awake, alert, oriented





- Constitutional


Vitals: 


                                   Vital Signs











Temp Pulse Resp BP Pulse Ox


 


 98.1 F   86   22   165/89   100 


 


 07/10/19 08:00  07/10/19 11:30  07/10/19 11:30  07/10/19 11:30  07/10/19 11:30








                           Temperature -Last 24 Hours











Temperature                    98.1 F


 


Temperature                    98.3 F


 


Temperature                    98.3 F


 


Temperature                    98.4 F


 


Temperature                    99.1 F


 


Temperature                    98.7 F


 


Temperature                    98.7 F

















- Labs


CBC & Chem 7: 


                                 19 04:29





                                 07/10/19 03:55


Labs: 


                              Abnormal lab results











  19 Range/Units





  05:36 04:13 12:33 


 


POC ABG pCO2   < 30 L   (35-45)  


 


POC ABG pO2     ()  


 


Sodium     (137-145)  mmol/L


 


Potassium     (3.6-5.0)  mmol/L


 


Chloride     ()  mmol/L


 


Carbon Dioxide     (22-30)  mmol/L


 


BUN     (7-17)  mg/dL


 


Creatinine     (0.7-1.2)  mg/dL


 


Glucose     ()  mg/dL


 


POC Glucose    140 H  ()  


 


Phosphorus     (2.5-4.5)  mg/dL


 


Magnesium     (1.7-2.3)  mg/dL


 


Direct Bilirubin     (0-0.2)  mg/dL


 


AST     (5-40)  units/L


 


ALT     (7-56)  units/L


 


Total Protein     (6.3-8.2)  g/dL


 


Albumin     (3.9-5)  g/dL


 


Lipase     (13-60)  units/L


 


Miscellaneous Test  Flexitest 1 H    














  19 Range/Units





  12:57 18:22 19:02 


 


POC ABG pCO2  30.6 L    (35-45)  


 


POC ABG pO2  125 H    ()  


 


Sodium     (137-145)  mmol/L


 


Potassium    3.5 L D  (3.6-5.0)  mmol/L


 


Chloride    111.4 H  ()  mmol/L


 


Carbon Dioxide    17 L  (22-30)  mmol/L


 


BUN    32 H  (7-17)  mg/dL


 


Creatinine    1.7 H  (0.7-1.2)  mg/dL


 


Glucose    156 H  ()  mg/dL


 


POC Glucose   156 H   ()  


 


Phosphorus     (2.5-4.5)  mg/dL


 


Magnesium     (1.7-2.3)  mg/dL


 


Direct Bilirubin     (0-0.2)  mg/dL


 


AST     (5-40)  units/L


 


ALT     (7-56)  units/L


 


Total Protein     (6.3-8.2)  g/dL


 


Albumin     (3.9-5)  g/dL


 


Lipase     (13-60)  units/L


 


Miscellaneous Test     














  07/09/19 07/10/19 07/10/19 Range/Units





  19:02 03:55 03:55 


 


POC ABG pCO2     (35-45)  


 


POC ABG pO2     ()  


 


Sodium   150 H   (137-145)  mmol/L


 


Potassium  3.5 L  3.1 L   (3.6-5.0)  mmol/L


 


Chloride   115.2 H   ()  mmol/L


 


Carbon Dioxide   20 L   (22-30)  mmol/L


 


BUN   31 H   (7-17)  mg/dL


 


Creatinine   1.6 H   (0.7-1.2)  mg/dL


 


Glucose   139 H   ()  mg/dL


 


POC Glucose     ()  


 


Phosphorus     (2.5-4.5)  mg/dL


 


Magnesium     (1.7-2.3)  mg/dL


 


Direct Bilirubin    0.4 H  (0-0.2)  mg/dL


 


AST    120 H  (5-40)  units/L


 


ALT    175 H  (7-56)  units/L


 


Total Protein    5.6 L  (6.3-8.2)  g/dL


 


Albumin    2.8 L  (3.9-5)  g/dL


 


Lipase    170 H  (13-60)  units/L


 


Miscellaneous Test     














  07/10/19 07/10/19 07/10/19 Range/Units





  03:55 06:04 11:34 


 


POC ABG pCO2     (35-45)  


 


POC ABG pO2     ()  


 


Sodium     (137-145)  mmol/L


 


Potassium     (3.6-5.0)  mmol/L


 


Chloride     ()  mmol/L


 


Carbon Dioxide     (22-30)  mmol/L


 


BUN     (7-17)  mg/dL


 


Creatinine     (0.7-1.2)  mg/dL


 


Glucose     ()  mg/dL


 


POC Glucose   125 H  139 H  ()  


 


Phosphorus  2.20 L    (2.5-4.5)  mg/dL


 


Magnesium  2.40 H    (1.7-2.3)  mg/dL


 


Direct Bilirubin     (0-0.2)  mg/dL


 


AST     (5-40)  units/L


 


ALT     (7-56)  units/L


 


Total Protein     (6.3-8.2)  g/dL


 


Albumin     (3.9-5)  g/dL


 


Lipase     (13-60)  units/L


 


Miscellaneous Test     














- Imaging and cardiology


Chest x-ray: report reviewed, image reviewed (improving edema. )


Abdominal x-ray: report reviewed, image reviewed (dilated bowel. Official read: 

?cecal distension v/s free air)

## 2019-07-10 NOTE — PROGRESS NOTE
Assessment and Plan





- Patient Problems


(1) Acute respiratory acidosis


Current Visit: Yes   Status: Resolved   





(2) Acute respiratory failure


Current Visit: Yes   Status: Acute   


Qualifiers: 


   Respiratory failure complication: hypoxia   Qualified Code(s): J96.01 - Acute

respiratory failure with hypoxia   





(3) Acute respiratory failure with hypercapnia


Current Visit: Yes   Status: Resolved   





(4) Electrolyte abnormality


Current Visit: Yes   Status: Acute   





(5) Elevated troponin


Current Visit: Yes   Status: Acute   





(6) Gestational hypertension


Current Visit: Yes   Status: Acute   


Qualifiers: 


   Trimester: third trimester   Qualified Code(s): O13.3 - Gestational 

[pregnancy-induced] hypertension without significant proteinuria, third 

trimester   





(7) Pulmonary edema


Current Visit: Yes   Status: Acute   





(8) S/P 


Current Visit: Yes   Status: Acute   





(9) Nausea & vomiting


Current Visit: Yes   Status: Acute   





(10) Abnormal abdominal x-ray


Current Visit: Yes   Status: Acute   





Subjective


Principal diagnosis: 1) POD #4 S/P 1LTCS  2)gestatinal hypertension 3) acute 

resp failur


Interval history: 





episodes of emesis.


extubated og FM 40% 





Objective


                               Vital Signs - 12hr











  07/10/19 07/10/19 07/10/19





  01:00 01:30 02:00


 


Temperature   


 


Pulse Rate 102 H 101 H 99 H


 


Pulse Rate [   





From Monitor]   


 


Respiratory 22 21 44 H





Rate   


 


Blood Pressure 150/75 157/92 158/83


 


O2 Sat by Pulse 100 100 100





Oximetry   














  07/10/19 07/10/19 07/10/19





  02:30 03:00 03:30


 


Temperature   


 


Pulse Rate 89 103 H 95 H


 


Pulse Rate [   





From Monitor]   


 


Respiratory 15 44 H 22





Rate   


 


Blood Pressure 150/80 158/83 166/89


 


O2 Sat by Pulse 100 100 98





Oximetry   














  07/10/19 07/10/19 07/10/19





  04:00 04:30 05:00


 


Temperature 98.3 F  


 


Pulse Rate 101 H 105 H 104 H


 


Pulse Rate [ 96 H  





From Monitor]   


 


Respiratory 25 H 14 17





Rate   


 


Blood Pressure 166/97 167/86 158/85


 


O2 Sat by Pulse 100 100 100





Oximetry   














  07/10/19 07/10/19 07/10/19





  05:30 06:00 06:30


 


Temperature   


 


Pulse Rate 102 H 97 H 107 H


 


Pulse Rate [   





From Monitor]   


 


Respiratory 21 21 20





Rate   


 


Blood Pressure 148/83 156/81 168/107


 


O2 Sat by Pulse 100 100 100





Oximetry   














  07/10/19 07/10/19 07/10/19





  07:00 07:30 08:00


 


Temperature   98.1 F


 


Pulse Rate 106 H 97 H 101 H


 


Pulse Rate [   





From Monitor]   


 


Respiratory 19 18 25 H





Rate   


 


Blood Pressure 167/105 166/106 164/84


 


O2 Sat by Pulse 100 100 100





Oximetry   














  07/10/19 07/10/19 07/10/19





  08:30 09:00 09:30


 


Temperature   


 


Pulse Rate 95 H 100 H 103 H


 


Pulse Rate [   





From Monitor]   


 


Respiratory 15 24 26 H





Rate   


 


Blood Pressure 163/83 164/88 167/91


 


O2 Sat by Pulse 100 100 100





Oximetry   














  07/10/19 07/10/19 07/10/19





  10:00 10:30 11:00


 


Temperature   


 


Pulse Rate 96 H 98 H 85


 


Pulse Rate [   





From Monitor]   


 


Respiratory 20 18 13





Rate   


 


Blood Pressure 164/88 151/88 148/83


 


O2 Sat by Pulse 99 100 100





Oximetry   














  07/10/19 07/10/19





  11:30 11:55


 


Temperature  98.4 F


 


Pulse Rate 86 


 


Pulse Rate [  





From Monitor]  


 


Respiratory 22 





Rate  


 


Blood Pressure 165/89 


 


O2 Sat by Pulse 100 





Oximetry  











Constitutional: no acute distress, alert


Eyes: non-icteric


ENT: oropharynx moist


Neck: supple


Effort: normal


Ascultation: Bilateral: clear


Cardiovascular: regular rate and rhythm


Gastrointestinal: normoactive bowel sounds, soft, other (distended, mildly TTP; 

no guarding/rebound)


Integumentary: normal


Extremities: no cyanosis, no edema, pink and warm


Neurologic: normal mental status, non-focal exam, pupils equal and round, CN II-

XII normal


Psychiatric: mood appropriate, affect normal


CBC and BMP: 


                                 19 04:29





                                 07/10/19 03:55


ABG, PT/INR, D-dimer: 


ABG











POC ABG pH  7.400  (7.35-7.45)   19  12:57    


 


POC ABG pCO2  30.6  (35-45)  L  19  12:57    


 


POC ABG pO2  125  ()  H  19  12:57    


 


POC ABG HCO3  19.0  (22-26 mml/L)   19  12:57    


 


POC ABG Total CO2  20  (23-27mmol/L)   19  12:57    


 


POC ABG O2 Sat  99   19  12:57    





PT/INR, D-dimer











PT  13.2 Sec. (12.2-14.9)   19  14:24    


 


INR  1.03  (0.87-1.13)   19  14:24    


 


  > 28890 ng/mlDDU (0-234)  H  19  14:24    








Abnormal lab findings: 


                                  Abnormal Labs











  19





  19:45 16:12 16:12


 


WBC  12.3 H  16.3 H 


 


RBC   


 


Hgb   


 


Hct   


 


MCHC   


 


RDW  16.9 H  16.6 H 


 


Plt Count   


 


Lymph % (Auto)   


 


Mono % (Auto)   


 


Lymph #   


 


Mono #   


 


Seg Neutrophils %   


 


Seg Neuts % (Manual)   


 


Lymphocytes % (Manual)   


 


Seg Neutrophils #   


 


Seg Neutrophils # Man   


 


APTT   


 


Fibrinogen   


 


D-Dimer   


 


POC ABG pH   


 


POC ABG pCO2   


 


POC ABG pO2   


 


Sodium   


 


Potassium   


 


Chloride   


 


Carbon Dioxide   


 


BUN   


 


Creatinine    0.5 L


 


Glucose   


 


POC Glucose   


 


Calcium   


 


Phosphorus   


 


Magnesium   


 


Direct Bilirubin   


 


AST   


 


ALT   


 


Lactate Dehydrogenase    248 H


 


Total Creatine Kinase   


 


CK-MB (CK-2)   


 


CK-MB (CK-2) Rel Index   


 


Troponin T   


 


C-Reactive Protein   


 


NT-Pro-B Natriuret Pep   


 


Total Protein   


 


Albumin   


 


Triglycerides   


 


Cholesterol   


 


HDL Cholesterol   


 


Lipase   


 


Urine WBC (Auto)   


 


Urine Creatinine   


 


Miscellaneous Test   














  19





  23:41 23:41 23:41


 


WBC  18.3 H  


 


RBC   


 


Hgb   


 


Hct   


 


MCHC   


 


RDW  16.6 H  


 


Plt Count   


 


Lymph % (Auto)  3.5 L  


 


Mono % (Auto)  7.7 H  


 


Lymph #  0.6 L  


 


Mono #  1.4 H  


 


Seg Neutrophils %  88.7 H  


 


Seg Neuts % (Manual)   


 


Lymphocytes % (Manual)   


 


Seg Neutrophils #  16.2 H  


 


Seg Neutrophils # Man   


 


APTT   


 


Fibrinogen   


 


D-Dimer   


 


POC ABG pH   


 


POC ABG pCO2   


 


POC ABG pO2   


 


Sodium   


 


Potassium   


 


Chloride   


 


Carbon Dioxide   


 


BUN   


 


Creatinine   


 


Glucose   


 


POC Glucose   


 


Calcium   


 


Phosphorus   


 


Magnesium   


 


Direct Bilirubin   


 


AST   


 


ALT   


 


Lactate Dehydrogenase   


 


Total Creatine Kinase   


 


CK-MB (CK-2)   


 


CK-MB (CK-2) Rel Index   


 


Troponin T    0.274 H*


 


C-Reactive Protein   


 


NT-Pro-B Natriuret Pep   5198 H 


 


Total Protein   


 


Albumin   


 


Triglycerides    540 H


 


Cholesterol    291 H


 


HDL Cholesterol    60 H


 


Lipase   


 


Urine WBC (Auto)   


 


Urine Creatinine   


 


Miscellaneous Test   














  19





  23:41 04:40 04:40


 


WBC   


 


RBC   


 


Hgb   


 


Hct   


 


MCHC   


 


RDW   


 


Plt Count   


 


Lymph % (Auto)   


 


Mono % (Auto)   


 


Lymph #   


 


Mono #   


 


Seg Neutrophils %   


 


Seg Neuts % (Manual)   


 


Lymphocytes % (Manual)   


 


Seg Neutrophils #   


 


Seg Neutrophils # Man   


 


APTT   


 


Fibrinogen   


 


D-Dimer   


 


POC ABG pH   


 


POC ABG pCO2   


 


POC ABG pO2   


 


Sodium   


 


Potassium   


 


Chloride    108.3 H


 


Carbon Dioxide    21 L


 


BUN   


 


Creatinine   


 


Glucose    195 H


 


POC Glucose   


 


Calcium    7.7 L


 


Phosphorus   


 


Magnesium   


 


Direct Bilirubin   


 


AST   


 


ALT   


 


Lactate Dehydrogenase   


 


Total Creatine Kinase  155 H  


 


CK-MB (CK-2)  11.4 H  


 


CK-MB (CK-2) Rel Index  7.3 H  


 


Troponin T   0.177 H* D 


 


C-Reactive Protein   


 


NT-Pro-B Natriuret Pep   


 


Total Protein   


 


Albumin   


 


Triglycerides   


 


Cholesterol   


 


HDL Cholesterol   


 


Lipase   


 


Urine WBC (Auto)   


 


Urine Creatinine   


 


Miscellaneous Test   














  19





  08:35 09:47 22:35


 


WBC    20.8 H


 


RBC   


 


Hgb   


 


Hct   


 


MCHC   


 


RDW    17.2 H


 


Plt Count   


 


Lymph % (Auto)   


 


Mono % (Auto)   


 


Lymph #   


 


Mono #   


 


Seg Neutrophils %   


 


Seg Neuts % (Manual)    79.0 H


 


Lymphocytes % (Manual)    12.0 L


 


Seg Neutrophils #   


 


Seg Neutrophils # Man    16.4 H


 


APTT   


 


Fibrinogen   


 


D-Dimer   


 


POC ABG pH   


 


POC ABG pCO2   


 


POC ABG pO2   


 


Sodium   


 


Potassium   


 


Chloride   


 


Carbon Dioxide   


 


BUN   


 


Creatinine   


 


Glucose   


 


POC Glucose   


 


Calcium   


 


Phosphorus   


 


Magnesium   


 


Direct Bilirubin   


 


AST   


 


ALT   


 


Lactate Dehydrogenase   


 


Total Creatine Kinase   


 


CK-MB (CK-2)   


 


CK-MB (CK-2) Rel Index   


 


Troponin T   0.157 H* 


 


C-Reactive Protein   


 


NT-Pro-B Natriuret Pep   


 


Total Protein   


 


Albumin   


 


Triglycerides   


 


Cholesterol   


 


HDL Cholesterol   


 


Lipase   


 


Urine WBC (Auto)  37.0 H  


 


Urine Creatinine   


 


Miscellaneous Test   














  19





  22:35 22:35 22:44


 


WBC   


 


RBC   


 


Hgb   


 


Hct   


 


MCHC   


 


RDW   


 


Plt Count   


 


Lymph % (Auto)   


 


Mono % (Auto)   


 


Lymph #   


 


Mono #   


 


Seg Neutrophils %   


 


Seg Neuts % (Manual)   


 


Lymphocytes % (Manual)   


 


Seg Neutrophils #   


 


Seg Neutrophils # Man   


 


APTT  20.7 L  


 


Fibrinogen   


 


D-Dimer   


 


POC ABG pH    7.046 L


 


POC ABG pCO2    > 70 H


 


POC ABG pO2    62 L


 


Sodium   


 


Potassium   


 


Chloride   


 


Carbon Dioxide   


 


BUN   


 


Creatinine   


 


Glucose   


 


POC Glucose   


 


Calcium   


 


Phosphorus   


 


Magnesium   


 


Direct Bilirubin   


 


AST   


 


ALT   


 


Lactate Dehydrogenase   


 


Total Creatine Kinase   267 H 


 


CK-MB (CK-2)   9.7 H 


 


CK-MB (CK-2) Rel Index   


 


Troponin T   0.313 H* D 


 


C-Reactive Protein   


 


NT-Pro-B Natriuret Pep   


 


Total Protein   


 


Albumin   


 


Triglycerides   


 


Cholesterol   


 


HDL Cholesterol   


 


Lipase   


 


Urine WBC (Auto)   


 


Urine Creatinine   


 


Miscellaneous Test   














  19





  00:03 01:24 04:28


 


WBC   


 


RBC   


 


Hgb   


 


Hct   


 


MCHC   


 


RDW   


 


Plt Count   


 


Lymph % (Auto)   


 


Mono % (Auto)   


 


Lymph #   


 


Mono #   


 


Seg Neutrophils %   


 


Seg Neuts % (Manual)   


 


Lymphocytes % (Manual)   


 


Seg Neutrophils #   


 


Seg Neutrophils # Man   


 


APTT   


 


Fibrinogen   


 


D-Dimer   


 


POC ABG pH   7.246 L 


 


POC ABG pCO2   47.0 H 


 


POC ABG pO2   72 L 


 


Sodium   


 


Potassium   


 


Chloride   


 


Carbon Dioxide   


 


BUN   


 


Creatinine   


 


Glucose   


 


POC Glucose  152 H  


 


Calcium   


 


Phosphorus   


 


Magnesium   


 


Direct Bilirubin   


 


AST   


 


ALT   


 


Lactate Dehydrogenase   


 


Total Creatine Kinase   


 


CK-MB (CK-2)   


 


CK-MB (CK-2) Rel Index   


 


Troponin T    0.740 H* D


 


C-Reactive Protein   


 


NT-Pro-B Natriuret Pep   


 


Total Protein   


 


Albumin   


 


Triglycerides   


 


Cholesterol   


 


HDL Cholesterol   


 


Lipase   


 


Urine WBC (Auto)   


 


Urine Creatinine   


 


Miscellaneous Test   














  19





  05:33 05:53 10:19


 


WBC   


 


RBC   


 


Hgb   


 


Hct   


 


MCHC   


 


RDW   


 


Plt Count   


 


Lymph % (Auto)   


 


Mono % (Auto)   


 


Lymph #   


 


Mono #   


 


Seg Neutrophils %   


 


Seg Neuts % (Manual)   


 


Lymphocytes % (Manual)   


 


Seg Neutrophils #   


 


Seg Neutrophils # Man   


 


APTT   


 


Fibrinogen   


 


D-Dimer   


 


POC ABG pH   7.328 L 


 


POC ABG pCO2   


 


POC ABG pO2   256 H 


 


Sodium   


 


Potassium   


 


Chloride   


 


Carbon Dioxide   


 


BUN   


 


Creatinine   


 


Glucose   


 


POC Glucose  153 H  


 


Calcium   


 


Phosphorus   


 


Magnesium   


 


Direct Bilirubin   


 


AST   


 


ALT   


 


Lactate Dehydrogenase   


 


Total Creatine Kinase   


 


CK-MB (CK-2)   


 


CK-MB (CK-2) Rel Index   


 


Troponin T   


 


C-Reactive Protein   


 


NT-Pro-B Natriuret Pep   


 


Total Protein   


 


Albumin   


 


Triglycerides   


 


Cholesterol   


 


HDL Cholesterol   


 


Lipase   


 


Urine WBC (Auto)   


 


Urine Creatinine    34.9 H


 


Miscellaneous Test   














  19





  10:38 10:38 12:43


 


WBC   


 


RBC   


 


Hgb   


 


Hct   


 


MCHC   


 


RDW   


 


Plt Count   


 


Lymph % (Auto)   


 


Mono % (Auto)   


 


Lymph #   


 


Mono #   


 


Seg Neutrophils %   


 


Seg Neuts % (Manual)   


 


Lymphocytes % (Manual)   


 


Seg Neutrophils #   


 


Seg Neutrophils # Man   


 


APTT   


 


Fibrinogen   


 


D-Dimer   


 


POC ABG pH   


 


POC ABG pCO2   


 


POC ABG pO2   


 


Sodium    134 L


 


Potassium   


 


Chloride   


 


Carbon Dioxide    18 L


 


BUN    21 H


 


Creatinine    3.0 H


 


Glucose    166 H


 


POC Glucose   


 


Calcium    7.6 L


 


Phosphorus   


 


Magnesium   


 


Direct Bilirubin   


 


AST   


 


ALT   


 


Lactate Dehydrogenase   


 


Total Creatine Kinase   431 H 


 


CK-MB (CK-2)   


 


CK-MB (CK-2) Rel Index   


 


Troponin T  0.473 H* D  


 


C-Reactive Protein   


 


NT-Pro-B Natriuret Pep    72367 H


 


Total Protein   


 


Albumin   


 


Triglycerides   


 


Cholesterol   


 


HDL Cholesterol   


 


Lipase   


 


Urine WBC (Auto)   


 


Urine Creatinine   


 


Miscellaneous Test   














  19





  12:43 13:02 14:11


 


WBC  16.1 H  


 


RBC  3.47 L  


 


Hgb   


 


Hct   


 


MCHC  35 H  


 


RDW  17.1 H  


 


Plt Count  129 L  


 


Lymph % (Auto)   


 


Mono % (Auto)   


 


Lymph #   


 


Mono #   


 


Seg Neutrophils %   


 


Seg Neuts % (Manual)   


 


Lymphocytes % (Manual)   


 


Seg Neutrophils #   


 


Seg Neutrophils # Man   


 


APTT   


 


Fibrinogen   


 


D-Dimer   


 


POC ABG pH   


 


POC ABG pCO2   


 


POC ABG pO2   


 


Sodium   


 


Potassium   


 


Chloride   


 


Carbon Dioxide   


 


BUN   


 


Creatinine   


 


Glucose   


 


POC Glucose   163 H 


 


Calcium   


 


Phosphorus   


 


Magnesium   


 


Direct Bilirubin   


 


AST   


 


ALT   


 


Lactate Dehydrogenase   


 


Total Creatine Kinase   


 


CK-MB (CK-2)   


 


CK-MB (CK-2) Rel Index   


 


Troponin T   


 


C-Reactive Protein    14.60 H


 


NT-Pro-B Natriuret Pep   


 


Total Protein   


 


Albumin   


 


Triglycerides   


 


Cholesterol   


 


HDL Cholesterol   


 


Lipase   


 


Urine WBC (Auto)   


 


Urine Creatinine   


 


Miscellaneous Test   














  19





  17:39 23:02 Unknown


 


WBC   


 


RBC   


 


Hgb   


 


Hct   


 


MCHC   


 


RDW   


 


Plt Count   


 


Lymph % (Auto)   


 


Mono % (Auto)   


 


Lymph #   


 


Mono #   


 


Seg Neutrophils %   


 


Seg Neuts % (Manual)   


 


Lymphocytes % (Manual)   


 


Seg Neutrophils #   


 


Seg Neutrophils # Man   


 


APTT   


 


Fibrinogen   


 


D-Dimer   


 


POC ABG pH   


 


POC ABG pCO2   


 


POC ABG pO2   


 


Sodium    134 L D


 


Potassium   


 


Chloride    97.9 L


 


Carbon Dioxide    18 L


 


BUN   


 


Creatinine    2.3 H D


 


Glucose    225 H


 


POC Glucose  162 H  155 H 


 


Calcium    7.4 L


 


Phosphorus   


 


Magnesium   


 


Direct Bilirubin   


 


AST   


 


ALT   


 


Lactate Dehydrogenase   


 


Total Creatine Kinase   


 


CK-MB (CK-2)   


 


CK-MB (CK-2) Rel Index   


 


Troponin T   


 


C-Reactive Protein   


 


NT-Pro-B Natriuret Pep   


 


Total Protein   


 


Albumin   


 


Triglycerides   


 


Cholesterol   


 


HDL Cholesterol   


 


Lipase   


 


Urine WBC (Auto)   


 


Urine Creatinine   


 


Miscellaneous Test   














  19





  03:44 03:44 05:07


 


WBC  14.8 H  


 


RBC  3.12 L  


 


Hgb  9.8 L  


 


Hct  28.5 L  


 


MCHC   


 


RDW  17.5 H  


 


Plt Count  127 L  


 


Lymph % (Auto)   


 


Mono % (Auto)   


 


Lymph #   


 


Mono #   


 


Seg Neutrophils %   


 


Seg Neuts % (Manual)   


 


Lymphocytes % (Manual)   


 


Seg Neutrophils #   


 


Seg Neutrophils # Man   


 


APTT   


 


Fibrinogen   


 


D-Dimer   


 


POC ABG pH   


 


POC ABG pCO2   


 


POC ABG pO2    114 H


 


Sodium   


 


Potassium   3.0 L 


 


Chloride   


 


Carbon Dioxide   18 L 


 


BUN   28 H 


 


Creatinine   3.1 H 


 


Glucose   139 H 


 


POC Glucose   


 


Calcium   7.7 L 


 


Phosphorus   


 


Magnesium   


 


Direct Bilirubin   


 


AST   


 


ALT   


 


Lactate Dehydrogenase   


 


Total Creatine Kinase   


 


CK-MB (CK-2)   


 


CK-MB (CK-2) Rel Index   


 


Troponin T   


 


C-Reactive Protein   


 


NT-Pro-B Natriuret Pep   


 


Total Protein   5.3 L 


 


Albumin   2.5 L 


 


Triglycerides   


 


Cholesterol   


 


HDL Cholesterol   


 


Lipase   


 


Urine WBC (Auto)   


 


Urine Creatinine   


 


Miscellaneous Test   














  19





  05:36 05:37 12:45


 


WBC   


 


RBC   


 


Hgb   


 


Hct   


 


MCHC   


 


RDW   


 


Plt Count   


 


Lymph % (Auto)   


 


Mono % (Auto)   


 


Lymph #   


 


Mono #   


 


Seg Neutrophils %   


 


Seg Neuts % (Manual)   


 


Lymphocytes % (Manual)   


 


Seg Neutrophils #   


 


Seg Neutrophils # Man   


 


APTT   


 


Fibrinogen   


 


D-Dimer   


 


POC ABG pH   


 


POC ABG pCO2   


 


POC ABG pO2   


 


Sodium   


 


Potassium   


 


Chloride   


 


Carbon Dioxide   


 


BUN   


 


Creatinine   


 


Glucose   


 


POC Glucose   151 H  170 H


 


Calcium   


 


Phosphorus   


 


Magnesium   


 


Direct Bilirubin   


 


AST   


 


ALT   


 


Lactate Dehydrogenase   


 


Total Creatine Kinase   


 


CK-MB (CK-2)   


 


CK-MB (CK-2) Rel Index   


 


Troponin T   


 


C-Reactive Protein   


 


NT-Pro-B Natriuret Pep   


 


Total Protein   


 


Albumin   


 


Triglycerides   


 


Cholesterol   


 


HDL Cholesterol   


 


Lipase   


 


Urine WBC (Auto)   


 


Urine Creatinine   


 


Miscellaneous Test  Flexitest 1 H  














  19





  14:24 18:05 23:41


 


WBC   


 


RBC   


 


Hgb   


 


Hct   


 


MCHC   


 


RDW   


 


Plt Count   


 


Lymph % (Auto)   


 


Mono % (Auto)   


 


Lymph #   


 


Mono #   


 


Seg Neutrophils %   


 


Seg Neuts % (Manual)   


 


Lymphocytes % (Manual)   


 


Seg Neutrophils #   


 


Seg Neutrophils # Man   


 


APTT  21.7 L  


 


Fibrinogen  586 H  


 


D-Dimer  > 54364 H  


 


POC ABG pH   


 


POC ABG pCO2   


 


POC ABG pO2   


 


Sodium   


 


Potassium   


 


Chloride   


 


Carbon Dioxide   


 


BUN   


 


Creatinine   


 


Glucose   


 


POC Glucose   168 H  149 H


 


Calcium   


 


Phosphorus   


 


Magnesium   


 


Direct Bilirubin   


 


AST   


 


ALT   


 


Lactate Dehydrogenase   


 


Total Creatine Kinase   


 


CK-MB (CK-2)   


 


CK-MB (CK-2) Rel Index   


 


Troponin T   


 


C-Reactive Protein   


 


NT-Pro-B Natriuret Pep   


 


Total Protein   


 


Albumin   


 


Triglycerides   


 


Cholesterol   


 


HDL Cholesterol   


 


Lipase   


 


Urine WBC (Auto)   


 


Urine Creatinine   


 


Miscellaneous Test   














  19





  04:13 04:29 04:29


 


WBC   17.7 H 


 


RBC   


 


Hgb   


 


Hct   


 


MCHC   


 


RDW   17.3 H 


 


Plt Count   


 


Lymph % (Auto)   5.9 L 


 


Mono % (Auto)   8.2 H 


 


Lymph #   1.0 L 


 


Mono #   1.4 H 


 


Seg Neutrophils %   85.6 H 


 


Seg Neuts % (Manual)   


 


Lymphocytes % (Manual)   


 


Seg Neutrophils #   15.2 H 


 


Seg Neutrophils # Man   


 


APTT   


 


Fibrinogen   


 


D-Dimer   


 


POC ABG pH   


 


POC ABG pCO2  < 30 L  


 


POC ABG pO2  122 H  


 


Sodium    146 H D


 


Potassium    2.8 L*


 


Chloride    108.9 H


 


Carbon Dioxide    17 L


 


BUN    33 H


 


Creatinine    2.3 H


 


Glucose    146 H


 


POC Glucose   


 


Calcium   


 


Phosphorus   


 


Magnesium   


 


Direct Bilirubin   


 


AST    82 H


 


ALT    83 H


 


Lactate Dehydrogenase   


 


Total Creatine Kinase   


 


CK-MB (CK-2)   


 


CK-MB (CK-2) Rel Index   


 


Troponin T   


 


C-Reactive Protein   


 


NT-Pro-B Natriuret Pep   


 


Total Protein    6.2 L


 


Albumin    2.6 L


 


Triglycerides   


 


Cholesterol   


 


HDL Cholesterol   


 


Lipase   


 


Urine WBC (Auto)   


 


Urine Creatinine   


 


Miscellaneous Test   














  19





  05:26 12:33 12:57


 


WBC   


 


RBC   


 


Hgb   


 


Hct   


 


MCHC   


 


RDW   


 


Plt Count   


 


Lymph % (Auto)   


 


Mono % (Auto)   


 


Lymph #   


 


Mono #   


 


Seg Neutrophils %   


 


Seg Neuts % (Manual)   


 


Lymphocytes % (Manual)   


 


Seg Neutrophils #   


 


Seg Neutrophils # Man   


 


APTT   


 


Fibrinogen   


 


D-Dimer   


 


POC ABG pH   


 


POC ABG pCO2    30.6 L


 


POC ABG pO2    125 H


 


Sodium   


 


Potassium   


 


Chloride   


 


Carbon Dioxide   


 


BUN   


 


Creatinine   


 


Glucose   


 


POC Glucose  138 H  140 H 


 


Calcium   


 


Phosphorus   


 


Magnesium   


 


Direct Bilirubin   


 


AST   


 


ALT   


 


Lactate Dehydrogenase   


 


Total Creatine Kinase   


 


CK-MB (CK-2)   


 


CK-MB (CK-2) Rel Index   


 


Troponin T   


 


C-Reactive Protein   


 


NT-Pro-B Natriuret Pep   


 


Total Protein   


 


Albumin   


 


Triglycerides   


 


Cholesterol   


 


HDL Cholesterol   


 


Lipase   


 


Urine WBC (Auto)   


 


Urine Creatinine   


 


Miscellaneous Test   














  19





  18:22 19:02 19:02


 


WBC   


 


RBC   


 


Hgb   


 


Hct   


 


MCHC   


 


RDW   


 


Plt Count   


 


Lymph % (Auto)   


 


Mono % (Auto)   


 


Lymph #   


 


Mono #   


 


Seg Neutrophils %   


 


Seg Neuts % (Manual)   


 


Lymphocytes % (Manual)   


 


Seg Neutrophils #   


 


Seg Neutrophils # Man   


 


APTT   


 


Fibrinogen   


 


D-Dimer   


 


POC ABG pH   


 


POC ABG pCO2   


 


POC ABG pO2   


 


Sodium   


 


Potassium   3.5 L D  3.5 L


 


Chloride   111.4 H 


 


Carbon Dioxide   17 L 


 


BUN   32 H 


 


Creatinine   1.7 H 


 


Glucose   156 H 


 


POC Glucose  156 H  


 


Calcium   


 


Phosphorus   


 


Magnesium   


 


Direct Bilirubin   


 


AST   


 


ALT   


 


Lactate Dehydrogenase   


 


Total Creatine Kinase   


 


CK-MB (CK-2)   


 


CK-MB (CK-2) Rel Index   


 


Troponin T   


 


C-Reactive Protein   


 


NT-Pro-B Natriuret Pep   


 


Total Protein   


 


Albumin   


 


Triglycerides   


 


Cholesterol   


 


HDL Cholesterol   


 


Lipase   


 


Urine WBC (Auto)   


 


Urine Creatinine   


 


Miscellaneous Test   














  07/10/19 07/10/19 07/10/19





  03:55 03:55 03:55


 


WBC   


 


RBC   


 


Hgb   


 


Hct   


 


MCHC   


 


RDW   


 


Plt Count   


 


Lymph % (Auto)   


 


Mono % (Auto)   


 


Lymph #   


 


Mono #   


 


Seg Neutrophils %   


 


Seg Neuts % (Manual)   


 


Lymphocytes % (Manual)   


 


Seg Neutrophils #   


 


Seg Neutrophils # Man   


 


APTT   


 


Fibrinogen   


 


D-Dimer   


 


POC ABG pH   


 


POC ABG pCO2   


 


POC ABG pO2   


 


Sodium  150 H  


 


Potassium  3.1 L  


 


Chloride  115.2 H  


 


Carbon Dioxide  20 L  


 


BUN  31 H  


 


Creatinine  1.6 H  


 


Glucose  139 H  


 


POC Glucose   


 


Calcium   


 


Phosphorus    2.20 L


 


Magnesium    2.40 H


 


Direct Bilirubin   0.4 H 


 


AST   120 H 


 


ALT   175 H 


 


Lactate Dehydrogenase   


 


Total Creatine Kinase   


 


CK-MB (CK-2)   


 


CK-MB (CK-2) Rel Index   


 


Troponin T   


 


C-Reactive Protein   


 


NT-Pro-B Natriuret Pep   


 


Total Protein   5.6 L 


 


Albumin   2.8 L 


 


Triglycerides   


 


Cholesterol   


 


HDL Cholesterol   


 


Lipase   170 H 


 


Urine WBC (Auto)   


 


Urine Creatinine   


 


Miscellaneous Test   














  07/10/19 07/10/19





  06:04 11:34


 


WBC  


 


RBC  


 


Hgb  


 


Hct  


 


MCHC  


 


RDW  


 


Plt Count  


 


Lymph % (Auto)  


 


Mono % (Auto)  


 


Lymph #  


 


Mono #  


 


Seg Neutrophils %  


 


Seg Neuts % (Manual)  


 


Lymphocytes % (Manual)  


 


Seg Neutrophils #  


 


Seg Neutrophils # Man  


 


APTT  


 


Fibrinogen  


 


D-Dimer  


 


POC ABG pH  


 


POC ABG pCO2  


 


POC ABG pO2  


 


Sodium  


 


Potassium  


 


Chloride  


 


Carbon Dioxide  


 


BUN  


 


Creatinine  


 


Glucose  


 


POC Glucose  125 H  139 H


 


Calcium  


 


Phosphorus  


 


Magnesium  


 


Direct Bilirubin  


 


AST  


 


ALT  


 


Lactate Dehydrogenase  


 


Total Creatine Kinase  


 


CK-MB (CK-2)  


 


CK-MB (CK-2) Rel Index  


 


Troponin T  


 


C-Reactive Protein  


 


NT-Pro-B Natriuret Pep  


 


Total Protein  


 


Albumin  


 


Triglycerides  


 


Cholesterol  


 


HDL Cholesterol  


 


Lipase  


 


Urine WBC (Auto)  


 


Urine Creatinine  


 


Miscellaneous Test  











Chest x-ray: report reviewed, image reviewed (improved bilat opacities)

## 2019-07-10 NOTE — PROGRESS NOTE
<ANEL CHEN - Last Filed: 07/10/19 10:56>





Assessment and Plan





S/P 


Acute Pulmonary edema


Acute renal failure


Acute respiratory failure


Elevated troponin


Pre-eclampsia


Peripartum cardiomyopathy





Echo reports a normal left ventricular size and function LVEF 45-50%.





Supportive cardiac management.








Subjective


Date of service: 07/10/19


Principal diagnosis: 1) POD #4 S/P 1LTCS  2)gestatinal hypertension 3) acute 

resp failur


Interval history: 





Patient has been extubated. No distress noted.








Objective


                                   Vital Signs











  Temp Pulse Pulse Resp BP Pulse Ox


 


 07/10/19 08:00  98.1 F     


 


 07/10/19 06:30   107 H   20  168/107  100


 


 07/10/19 06:00   97 H   21  156/81  100


 


 07/10/19 05:30   102 H   21  148/83  100


 


 07/10/19 05:00   104 H   17  158/85  100


 


 07/10/19 04:30   105 H   14  167/86  100


 


 07/10/19 04:00  98.3 F  101 H  96 H  25 H  166/97  100


 


 07/10/19 03:30   95 H   22  166/89  98


 


 07/10/19 03:00   103 H   44 H  158/83  100


 


 07/10/19 02:30   89   15  150/80  100


 


 07/10/19 02:00   99 H   44 H  158/83  100


 


 07/10/19 01:30   101 H   21  157/92  100


 


 07/10/19 01:00   102 H   22  150/75  100


 


 07/10/19 00:30   112 H   17  164/90  99


 


 07/10/19 00:04       100


 


 07/10/19 00:00  98.3 F  116 H  96 H  25 H  134/70  100


 


 19 23:30   112 H   25 H  161/88  100


 


 19 23:00   78   15  161/88  100


 


 19 22:30   95 H   25 H  146/66  100


 


 19 22:00   82   22  159/79  100


 


 19 21:30   92 H   25 H  156/94  100


 


 19 21:00   80   21  161/95  100


 


 19 20:30   93 H   17  161/95  100


 


 19 20:00  98.4 F  96 H  101 H  22  161/97  100


 


 19 19:41       100


 


 19 19:30   103 H   19  168/84  99


 


 19 19:00   113 H   24  148/92  99


 


 19 18:30   99 H   19  170/87  100


 


 19 18:06   105 H   21  148/92  100


 


 19 18:00   108 H   17  148/92  100


 


 19 17:30   105 H   17  163/91  99


 


 19 17:00   110 H   16  156/90  99


 


 19 16:30   109 H   16  154/91  99


 


 19 16:00  99.1 F  107 H  98 H  20  154/91  100


 


 19 15:30   108 H   20  138/88  99


 


 19 15:00   112 H   20  137/82  99


 


 19 14:30   108 H   13  177/111  99


 


 19 14:27   108 H    177/111 


 


 19 14:00   109 H   13  154/102  99


 


 19 13:30   115 H   13  156/90  99


 


 19 13:00   102 H   17  128/95  99


 


 19 12:30   110 H   19  128/95  99


 


 19 12:00  98.7 F  112 H  97 H  18  140/84  99


 


 19 11:30   114 H   17  138/77  99


 


 19 11:00   104 H   16  144/78  99














- Physical Examination


General: No Apparent Distress


HEENT: Positive: PERRL


Neck: Positive: trachea midline


Cardiac: Positive: Reg Rate and Rhythm


Lungs: Positive: Decreased Breath Sounds


Neuro: Positive: Grossly Intact


Extremities: Absent: edema





- Labs and Meds


                                 Cardiac Enzymes











  07/10/19 Range/Units





  03:55 


 


AST  120 H  (5-40)  units/L








                          Comprehensive Metabolic Panel











  07/09/19 07/09/19 07/10/19 Range/Units





  19:02 19:02 03:55 


 


Sodium  145   150 H  (137-145)  mmol/L


 


Potassium  3.5 L D  3.5 L  3.1 L  (3.6-5.0)  mmol/L


 


Chloride  111.4 H   115.2 H  ()  mmol/L


 


Carbon Dioxide  17 L   20 L  (22-30)  mmol/L


 


BUN  32 H   31 H  (7-17)  mg/dL


 


Creatinine  1.7 H   1.6 H  (0.7-1.2)  mg/dL


 


Glucose  156 H   139 H  ()  mg/dL


 


Calcium  8.5   8.4  (8.4-10.2)  mg/dL


 


Direct Bilirubin     (0-0.2)  mg/dL


 


Indirect Bilirubin     mg/dL


 


AST     (5-40)  units/L


 


ALT     (7-56)  units/L


 


Alkaline Phosphatase     ()  units/L


 


Total Protein     (6.3-8.2)  g/dL


 


Albumin     (3.9-5)  g/dL














  07/10/19 Range/Units





  03:55 


 


Sodium   (137-145)  mmol/L


 


Potassium   (3.6-5.0)  mmol/L


 


Chloride   ()  mmol/L


 


Carbon Dioxide   (22-30)  mmol/L


 


BUN   (7-17)  mg/dL


 


Creatinine   (0.7-1.2)  mg/dL


 


Glucose   ()  mg/dL


 


Calcium   (8.4-10.2)  mg/dL


 


Direct Bilirubin  0.4 H  (0-0.2)  mg/dL


 


Indirect Bilirubin  0.5  mg/dL


 


AST  120 H  (5-40)  units/L


 


ALT  175 H  (7-56)  units/L


 


Alkaline Phosphatase  126  ()  units/L


 


Total Protein  5.6 L  (6.3-8.2)  g/dL


 


Albumin  2.8 L  (3.9-5)  g/dL














<DEZ MISHRA - Last Filed: 07/10/19 18:57>





Assessment and Plan





I have seen and evaluated the patient and agree with the assessment and plan. 

Echo reports a normal left ventricular size and function LVEF 45-50%. Continue 

supportive care. 





Objective


                                   Vital Signs











  Temp Pulse Pulse Resp BP Pulse Ox


 


 07/10/19 18:01   93 H   32 H  140/83  100


 


 07/10/19 17:31   82   21  146/88  100


 


 07/10/19 17:00   96 H   26 H  146/88  100


 


 07/10/19 16:30   105 H   25 H  144/87  100


 


 07/10/19 16:00   103 H   17  150/100  100


 


 07/10/19 15:39  98.6 F     


 


 07/10/19 15:30   107 H   21  150/100  100


 


 07/10/19 15:00   104 H   21  157/94  99


 


 07/10/19 14:30   97 H   22  153/88 


 


 07/10/19 14:24   115 H   14  153/88  100


 


 07/10/19 13:30   96 H   21  153/88  100


 


 07/10/19 13:00   84   22  164/86  100


 


 07/10/19 12:30   77   20  146/75  100


 


 07/10/19 12:00   89   19  169/96  100


 


 07/10/19 11:55  98.4 F     


 


 07/10/19 11:30   86   22  165/89  100


 


 07/10/19 11:00   85   13  148/83  100


 


 07/10/19 10:30   98 H   18  151/88  100


 


 07/10/19 10:00   96 H   20  164/88  99


 


 07/10/19 09:30   103 H   26 H  167/91  100


 


 07/10/19 09:00   100 H   24  164/88  100


 


 07/10/19 08:30   95 H   15  163/83  100


 


 07/10/19 08:00  98.1 F  100 H   25 H  164/84  100


 


 07/10/19 07:30   97 H   18  166/106  100


 


 07/10/19 07:00   106 H   19  167/105  100


 


 07/10/19 06:30   107 H   20  168/107  100


 


 07/10/19 06:00   97 H   21  156/81  100


 


 07/10/19 05:30   102 H   21  148/83  100


 


 07/10/19 05:00   104 H   17  158/85  100


 


 07/10/19 04:30   105 H   14  167/86  100


 


 07/10/19 04:00  98.3 F  101 H  96 H  25 H  166/97  100


 


 07/10/19 03:30   95 H   22  166/89  98


 


 07/10/19 03:00   103 H   44 H  158/83  100


 


 07/10/19 02:30   89   15  150/80  100


 


 07/10/19 02:00   99 H   44 H  158/83  100


 


 07/10/19 01:30   101 H   21  157/92  100


 


 07/10/19 01:00   102 H   22  150/75  100


 


 07/10/19 00:30   112 H   17  164/90  99


 


 07/10/19 00:04       100


 


 07/10/19 00:00  98.3 F  116 H  96 H  25 H  134/70  100


 


 19 23:30   112 H   25 H  161/88  100


 


 19 23:00   78   15  161/88  100


 


 19 22:30   95 H   25 H  146/66  100


 


 19 22:00   82   22  159/79  100


 


 19 21:30   92 H   25 H  156/94  100


 


 19 21:00   80   21  161/95  100


 


 19 20:30   93 H   17  161/95  100


 


 19 20:00  98.4 F  96 H  101 H  22  161/97  100


 


 19 19:41       100


 


 19 19:30   103 H   19  168/84  99


 


 19 19:00   113 H   24  148/92  99














- Labs and Meds


                                 Cardiac Enzymes











  07/10/19 Range/Units





  03:55 


 


AST  120 H  (5-40)  units/L








                          Comprehensive Metabolic Panel











  07/09/19 07/09/19 07/10/19 Range/Units





  19:02 19:02 03:55 


 


Sodium  145   150 H  (137-145)  mmol/L


 


Potassium  3.5 L D  3.5 L  3.1 L  (3.6-5.0)  mmol/L


 


Chloride  111.4 H   115.2 H  ()  mmol/L


 


Carbon Dioxide  17 L   20 L  (22-30)  mmol/L


 


BUN  32 H   31 H  (7-17)  mg/dL


 


Creatinine  1.7 H   1.6 H  (0.7-1.2)  mg/dL


 


Glucose  156 H   139 H  ()  mg/dL


 


Calcium  8.5   8.4  (8.4-10.2)  mg/dL


 


Direct Bilirubin     (0-0.2)  mg/dL


 


Indirect Bilirubin     mg/dL


 


AST     (5-40)  units/L


 


ALT     (7-56)  units/L


 


Alkaline Phosphatase     ()  units/L


 


Total Protein     (6.3-8.2)  g/dL


 


Albumin     (3.9-5)  g/dL














  07/10/19 Range/Units





  03:55 


 


Sodium   (137-145)  mmol/L


 


Potassium   (3.6-5.0)  mmol/L


 


Chloride   ()  mmol/L


 


Carbon Dioxide   (22-30)  mmol/L


 


BUN   (7-17)  mg/dL


 


Creatinine   (0.7-1.2)  mg/dL


 


Glucose   ()  mg/dL


 


Calcium   (8.4-10.2)  mg/dL


 


Direct Bilirubin  0.4 H  (0-0.2)  mg/dL


 


Indirect Bilirubin  0.5  mg/dL


 


AST  120 H  (5-40)  units/L


 


ALT  175 H  (7-56)  units/L


 


Alkaline Phosphatase  126  ()  units/L


 


Total Protein  5.6 L  (6.3-8.2)  g/dL


 


Albumin  2.8 L  (3.9-5)  g/dL

## 2019-07-10 NOTE — XRAY REPORT
CHEST 1 VIEW



INDICATION: 

follow up respiratory failure.



COMPARISON: 

One day prior.



FINDINGS:



Support devices: The patient has been extubated.



Heart: Stable. 



Lungs/Pleura: Mild bilateral pulmonary opacities are stable.  







IMPRESSION:

1. No significant change.



Signer Name: Bryan Galvan MD 

Signed: 7/10/2019 2:59 AM

 Workstation Name: [id number]

## 2019-07-10 NOTE — CAT SCAN REPORT
CT abdomen pelvis wo con 



INDICATION / CLINICAL INFORMATION:

Abdominal distention; postop  2019.



TECHNIQUE:

All CT scans at this location are performed using CT dose reduction for ALARA by means of automated e
xposure control. 



COMPARISON:

None available.



FINDINGS:

ABDOMEN: There is moderately severe gaseous distention of the colon, most prominent involving the rig
ht and transverse colon. There is milder dilatation of the left colon to the level of the rectum. No 
constricting lesion is seen. No portion of the colon is collapsed. I see no evidence of bowel wall th
ickening or free air. The cecum measures approximately 10.5 cm transverse. Small bowel loops are mild
ly dilated. There is a nasogastric tube with the tip overlying the distal descending duodenum.



The liver, spleen, gallbladder, bile ducts, pancreas, adrenal glands and kidneys are normal. There is
 patchy consolidation in the posterior portion of the right lower lobe. No pleural effusion is seen.



PELVIS: There is a Randolph catheter in a collapsed urinary bladder. The distal ureters are normal. Ther
e is a postpartum uterus. I see no evidence of adnexal mass. There is a trace amount of ascites and t
here is mild peritoneal/mesenteric edema. I do not identify a hernia. No acute osseous abnormality is
 seen.



IMPRESSION:

1. Moderately severe generalized gaseous distention of the colon, especially the right and transverse
 colon. There is mild small bowel dilatation.No cause is seen and the findings are probably related t
o a postoperative adynamic ileus, predominantly involving the colon.

2. Right lower lobe parenchymal disease may be related to aspiration pneumonia or bacterial pneumonia
.



Signer Name: Juan De La Cruz MD 

Signed: 7/10/2019 4:19 PM

 Workstation Name: [id number]

## 2019-07-10 NOTE — EVENT NOTE
Date: 07/10/19


Patient resting in her room.  Denies nausea vomiting or platelets today.  

Desires ice chips.  Patient did state that she set up in a chair earlier this 

afternoon


Vital signs stable patient is afebrile


Abdomen soft little less distended


Addendum OCT scan consistent with ileus possible right lower lobe pneumonia


Assessment


Postop day 5  section


Status post respiratory distress-patient without complaint shortness of breath


Acute renal failure


Ileus-patient did well with clamping NG-tube for approximately 6 hours today


Plan


We will continue routine postoperative care with increasing patient movement 

will sit up insurance evening possibly ice chips later on this evening


We'll discuss with infectious disease and medicine service the findings on the 

CT scan.  Patient presently not on antibiotics

## 2019-07-10 NOTE — PROGRESS NOTE
Assessment and Plan





- Patient Problems


(1) Acute renal failure


Current Visit: Yes   Status: Acute   


Qualifiers: 


   Acute renal failure type: with acute tubular necrosis   Qualified Code(s): 

N17.0 - Acute kidney failure with tubular necrosis   


Plan to address problem: 


Patient creatinine continues to improve.  Appreciate nephrology's help in 

managing.








(2) Acute respiratory acidosis


Current Visit: Yes   Status: Resolved   





(3) Acute respiratory failure


Current Visit: Yes   Status: Acute   


Qualifiers: 


   Respiratory failure complication: hypoxia   Qualified Code(s): J96.01 - Acute

respiratory failure with hypoxia   


Plan to address problem: 


Patient has been extubated.  He is now on facemask oxygen.  Pulmonary continues 

to follow








(4) Elevated troponin


Current Visit: Yes   Status: Acute   





(5) Gestational hypertension


Current Visit: Yes   Status: Resolved   


Qualifiers: 


   Trimester: third trimester   Qualified Code(s): O13.3 - Gestational 

[pregnancy-induced] hypertension without significant proteinuria, third 

trimester   





(6) S/P 


Current Visit: Yes   Status: Acute   





(7) S/P primary low transverse 


Current Visit: Yes   Status: Acute   





(8) Rh negative, delivered, current hospitalization


Current Visit: Yes   Status: Acute   


Plan to address problem: 


While patient was intubated the patient's  decline administration of 

RhoGAM due to no plans for future pregnancies.  Discussed with patient and her 

 indication for RhoGAM and discussed the failure rate of birth control 

methods.  Patient agrees to proceed.  All questions answered.








(9) Hypokalemia


Current Visit: Yes   Status: Acute   


Plan to address problem: 


Patient to continue to receive potassium supplementation








Subjective





- Subjective


Date of service: 07/10/19


Principal diagnosis: 1) POD #5S/P 1LTCS  2) acute resp failure 3)acute renal 

failure


Interval history: 





Since last visit patient has been extubated.  Patient has NG tube in place due 

to nausea and vomiting yesterday.


Patient reports: pain well controlled, other (patient complains of an thirsty)





Objective





- Vital Signs


Latest vital signs: 


                                   Vital Signs











  Temp Pulse Pulse Resp BP Pulse Ox


 


 07/10/19 08:00  98.1 F     


 


 07/10/19 06:30   107 H   20  168/107  100


 


 07/10/19 06:00   97 H   21  156/81  100


 


 07/10/19 05:30   102 H   21  148/83  100


 


 07/10/19 05:00   104 H   17  158/85  100


 


 07/10/19 04:30   105 H   14  167/86  100


 


 07/10/19 04:00  98.3 F  101 H  96 H  25 H  166/97  100


 


 07/10/19 03:30   95 H   22  166/89  98


 


 07/10/19 03:00   103 H   44 H  158/83  100


 


 07/10/19 02:30   89   15  150/80  100


 


 07/10/19 02:00   99 H   44 H  158/83  100


 


 07/10/19 01:30   101 H   21  157/92  100


 


 07/10/19 01:00   102 H   22  150/75  100


 


 07/10/19 00:30   112 H   17  164/90  99


 


 07/10/19 00:04       100


 


 07/10/19 00:00  98.3 F  116 H  96 H  25 H  134/70  100


 


 19 23:30   112 H   25 H  161/88  100


 


 19 23:00   78   15  161/88  100


 


 19 22:30   95 H   25 H  146/66  100


 


 19 22:00   82   22  159/79  100


 


 19 21:30   92 H   25 H  156/94  100


 


 19 21:00   80   21  161/95  100


 


 19 20:30   93 H   17  161/95  100


 


 19 20:00  98.4 F  96 H  101 H  22  161/97  100


 


 19 19:41       100


 


 19 19:30   103 H   19  168/84  99


 


 19 19:00   113 H   24  148/92  99


 


 19 18:30   99 H   19  170/87  100


 


 19 18:06   105 H   21  148/92  100


 


 19 18:00   108 H   17  148/92  100


 


 19 17:30   105 H   17  163/91  99


 


 19 17:00   110 H   16  156/90  99


 


 19 16:30   109 H   16  154/91  99


 


 19 16:00  99.1 F  107 H  98 H  20  154/91  100


 


 19 15:30   108 H   20  138/88  99


 


 19 15:00   112 H   20  137/82  99


 


 19 14:30   108 H   13  177/111  99


 


 19 14:27   108 H    177/111 


 


 19 14:00   109 H   13  154/102  99


 


 19 13:30   115 H   13  156/90  99


 


 19 13:00   102 H   17  128/95  99


 


 19 12:30   110 H   19  128/95  99


 


 19 12:00  98.7 F  112 H  97 H  18  140/84  99


 


 19 11:30   114 H   17  138/77  99


 


 19 11:00   104 H   16  144/78  99


 


 19 10:30   104 H   18  144/78  99


 


 19 10:00   122 H   14  140/80  99








                                Intake and Output











 07/09/19 07/10/19 07/10/19





 22:59 06:59 14:59


 


Output Total 350 1050 


 


Balance -350 -1050 


 


Output:   


 


  Urine 350 1050 


 


    Indwelling Catheter 350 1050 


 


Other:   


 


  Total, Output Amount 350 600 


 


  Voiding Method Indwelling Catheter Indwelling Catheter 


 


  # Bowel Movements 1 2 














- Exam


Breasts: Present: deferred


Cardiovascular: Present: Regular rate


Abdomen: Present: normal appearance, soft, distention


Uterus: Present: other (unable to palpate due to abdominal distention)


Incision: Present: dry, intact





- Labs


Labs: 


                              Abnormal lab results











  19 Range/Units





  05:36 04:13 12:33 


 


POC ABG pCO2   < 30 L   (35-45)  


 


POC ABG pO2     ()  


 


Sodium     (137-145)  mmol/L


 


Potassium     (3.6-5.0)  mmol/L


 


Chloride     ()  mmol/L


 


Carbon Dioxide     (22-30)  mmol/L


 


BUN     (7-17)  mg/dL


 


Creatinine     (0.7-1.2)  mg/dL


 


Glucose     ()  mg/dL


 


POC Glucose    140 H  ()  


 


Phosphorus     (2.5-4.5)  mg/dL


 


Magnesium     (1.7-2.3)  mg/dL


 


Direct Bilirubin     (0-0.2)  mg/dL


 


AST     (5-40)  units/L


 


ALT     (7-56)  units/L


 


Total Protein     (6.3-8.2)  g/dL


 


Albumin     (3.9-5)  g/dL


 


Lipase     (13-60)  units/L


 


Miscellaneous Test  Flexitest 1 H    














  19 Range/Units





  12:57 18:22 19:02 


 


POC ABG pCO2  30.6 L    (35-45)  


 


POC ABG pO2  125 H    ()  


 


Sodium     (137-145)  mmol/L


 


Potassium    3.5 L D  (3.6-5.0)  mmol/L


 


Chloride    111.4 H  ()  mmol/L


 


Carbon Dioxide    17 L  (22-30)  mmol/L


 


BUN    32 H  (7-17)  mg/dL


 


Creatinine    1.7 H  (0.7-1.2)  mg/dL


 


Glucose    156 H  ()  mg/dL


 


POC Glucose   156 H   ()  


 


Phosphorus     (2.5-4.5)  mg/dL


 


Magnesium     (1.7-2.3)  mg/dL


 


Direct Bilirubin     (0-0.2)  mg/dL


 


AST     (5-40)  units/L


 


ALT     (7-56)  units/L


 


Total Protein     (6.3-8.2)  g/dL


 


Albumin     (3.9-5)  g/dL


 


Lipase     (13-60)  units/L


 


Miscellaneous Test     














  07/09/19 07/10/19 07/10/19 Range/Units





  19:02 03:55 03:55 


 


POC ABG pCO2     (35-45)  


 


POC ABG pO2     ()  


 


Sodium   150 H   (137-145)  mmol/L


 


Potassium  3.5 L  3.1 L   (3.6-5.0)  mmol/L


 


Chloride   115.2 H   ()  mmol/L


 


Carbon Dioxide   20 L   (22-30)  mmol/L


 


BUN   31 H   (7-17)  mg/dL


 


Creatinine   1.6 H   (0.7-1.2)  mg/dL


 


Glucose   139 H   ()  mg/dL


 


POC Glucose     ()  


 


Phosphorus     (2.5-4.5)  mg/dL


 


Magnesium     (1.7-2.3)  mg/dL


 


Direct Bilirubin    0.4 H  (0-0.2)  mg/dL


 


AST    120 H  (5-40)  units/L


 


ALT    175 H  (7-56)  units/L


 


Total Protein    5.6 L  (6.3-8.2)  g/dL


 


Albumin    2.8 L  (3.9-5)  g/dL


 


Lipase    170 H  (13-60)  units/L


 


Miscellaneous Test     














  07/10/19 07/10/19 Range/Units





  03:55 06:04 


 


POC ABG pCO2    (35-45)  


 


POC ABG pO2    ()  


 


Sodium    (137-145)  mmol/L


 


Potassium    (3.6-5.0)  mmol/L


 


Chloride    ()  mmol/L


 


Carbon Dioxide    (22-30)  mmol/L


 


BUN    (7-17)  mg/dL


 


Creatinine    (0.7-1.2)  mg/dL


 


Glucose    ()  mg/dL


 


POC Glucose   125 H  ()  


 


Phosphorus  2.20 L   (2.5-4.5)  mg/dL


 


Magnesium  2.40 H   (1.7-2.3)  mg/dL


 


Direct Bilirubin    (0-0.2)  mg/dL


 


AST    (5-40)  units/L


 


ALT    (7-56)  units/L


 


Total Protein    (6.3-8.2)  g/dL


 


Albumin    (3.9-5)  g/dL


 


Lipase    (13-60)  units/L


 


Miscellaneous Test

## 2019-07-10 NOTE — XRAY REPORT
SUPINE ABDOMEN



INDICATION:

nausea/vomiting.



COMPARISON:

One day prior.



FINDINGS:

Nasogastric tube is unchanged. Diffuse gaseous distention of the colon is again noted. Dilated loops 
of small bowel are again noted as well. 



There is lucency over the right lower abdomen, I do not see bowel folds associated with this.



IMPRESSION:

1. Lucency over the right lower abdomen. While this could be a distended cecum, I do not see definite
 bowel folds/haustra within this. This could be free air. Bilateral lateral decubitus views or CT zoraida
uld be considered to confirm that this is only dilated cecum and not pneumoperitoneum.







COMMUNICATION:

Time of Communication: 2:04 AM central

Licensed Practitioner Receiving Report: [facility]IRST[facility] 











Signer Name: Bryan Galvan MD 

Signed: 7/10/2019 3:06 AM

 Workstation Name: [id number]

## 2019-07-10 NOTE — PROGRESS NOTE
Assessment and Plan


Impression:


* NAZARIO with ATN


* sepsis


* ARDS


* post partum


* hypoxemia


* Hypernatremia


* Hypokalemia





Plan:


* Renal function continues to improve.  Serum creatinine down to 1.6 today.  


* Patient remains nonoliguric.  Approximately 1800 mL of urine recorded 

  yesterday.


* keep MAP >65


* avoid nephrotoxins


* Replace potassium


* Serum sodium appears to be rising.  Add IV fluids with D5W.  Check urine 

  sodium and urine osmolality


* duresis as tolerated


* No indication for renal replacement therapy at this time


* Discussed with patient's  











Subjective


Date of service: 07/10/19


Principal diagnosis: 1) POD #4 S/P 1LTCS  2)gestatinal hypertension 3) acute 

resp failur


Interval history: 


Patient has been extubated.  She remains in the ICU.  Appears comfortable.








Objective





- Vital Signs


Vital signs: 


                               Vital Signs - 12hr











  07/09/19 07/09/19 07/09/19





  22:00 22:30 23:00


 


Temperature   


 


Pulse Rate 82 95 H 78


 


Pulse Rate [   





From Monitor]   


 


Respiratory 22 25 H 15





Rate   


 


Blood Pressure 159/79 146/66 161/88


 


O2 Sat by Pulse 100 100 100





Oximetry   














  07/09/19 07/10/19 07/10/19





  23:30 00:00 00:04


 


Temperature  98.3 F 


 


Pulse Rate 112 H 116 H 


 


Pulse Rate [  96 H 





From Monitor]   


 


Respiratory 25 H 25 H 





Rate   


 


Blood Pressure 161/88 134/70 


 


O2 Sat by Pulse 100 100 100





Oximetry   














  07/10/19 07/10/19 07/10/19





  00:30 01:00 01:30


 


Temperature   


 


Pulse Rate 112 H 102 H 101 H


 


Pulse Rate [   





From Monitor]   


 


Respiratory 17 22 21





Rate   


 


Blood Pressure 164/90 150/75 157/92


 


O2 Sat by Pulse 99 100 100





Oximetry   














  07/10/19 07/10/19 07/10/19





  02:00 02:30 03:00


 


Temperature   


 


Pulse Rate 99 H 89 103 H


 


Pulse Rate [   





From Monitor]   


 


Respiratory 44 H 15 44 H





Rate   


 


Blood Pressure 158/83 150/80 158/83


 


O2 Sat by Pulse 100 100 100





Oximetry   














  07/10/19 07/10/19 07/10/19





  03:30 04:00 04:30


 


Temperature  98.3 F 


 


Pulse Rate 95 H 101 H 105 H


 


Pulse Rate [  96 H 





From Monitor]   


 


Respiratory 22 25 H 14





Rate   


 


Blood Pressure 166/89 166/97 167/86


 


O2 Sat by Pulse 98 100 100





Oximetry   














  07/10/19 07/10/19 07/10/19





  05:00 05:30 06:00


 


Temperature   


 


Pulse Rate 104 H 102 H 97 H


 


Pulse Rate [   





From Monitor]   


 


Respiratory 17 21 21





Rate   


 


Blood Pressure 158/85 148/83 156/81


 


O2 Sat by Pulse 100 100 100





Oximetry   














  07/10/19 07/10/19





  06:30 08:00


 


Temperature  98.1 F


 


Pulse Rate 107 H 


 


Pulse Rate [  





From Monitor]  


 


Respiratory 20 





Rate  


 


Blood Pressure 168/107 


 


O2 Sat by Pulse 100 





Oximetry  














- General Appearance


General appearance: well-developed, well-nourished, appears stated age


EENT: PERRL, mucous membranes moist


Neck: no JVD, no thyromegaly, no carotid bruit, supple


Respiratory: Present: Ronchi (few scattered rhonchi)


Cardiology: regular, normal heart rate, S1S2, no murmurs


Gastrointestinal: hypoactive bowel sounds, other (incision noted in the 

hypogastric area)


Integumentary: other (1+ edema)





- Lab





                                 07/09/19 04:29





                                 07/10/19 03:55


                             Most recent lab results











Calcium  8.4 mg/dL (8.4-10.2)   07/10/19  03:55    


 


Phosphorus  2.20 mg/dL (2.5-4.5)  L  07/10/19  03:55    


 


Magnesium  2.40 mg/dL (1.7-2.3)  H  07/10/19  03:55    


 


  34.9 mg/dL (0.1-20.0)  H  07/07/19  10:19    


 


  72 mmol/L  07/07/19  10:19    














Medications & Allergies





- Medications


Allergies/Adverse Reactions: 


                                    Allergies





No Known Allergies Allergy (Verified 07/04/19 19:31)


   








Home Medications: 


                                Home Medications











 Medication  Instructions  Recorded  Confirmed  Last Taken  Type


 


No Known Home Medications [No  07/05/19 07/05/19 Unknown History





Reported Home Medications]     











Active Medications: 














Generic Name Dose Route Start Last Admin





  Trade Name Freq  PRN Reason Stop Dose Admin


 


Albuterol  2.5 mg  07/10/19 00:12 





  Proventil  IH  





  Q4HRT PRN  





  Shortness Of Breath  


 


Bisacodyl  10 mg  07/05/19 23:16 





  Dulcolax  NE  





  QDAY PRN  





  constipation unrelieved by MOM  


 


Hydralazine HCl  10 mg  07/09/19 03:30  07/09/19 23:16





  Apresoline  IV   10 mg





  Q4HR PRN   Administration





  SBP > 160  


 


Hydrophilic Ointment  1 applic  07/06/19 22:12 





  Vaseline Lip Therapy  TP  





  Q2HR PRN  





  Dry Lips  


 


Potassium Phosphate 30 mmol/  510 mls @ 83 mls/hr  07/10/19 10:00  07/10/19 

09:48





  Sodium Chloride  IV  07/10/19 16:08  83 mls/hr





  ONCE ONE   Administration


 


Potassium Chloride/Dextrose/Sod Cl  20 meq in 1,000 mls @ 100 mls/hr  07/10/19 

10:00 





  D5w/0.45% Nacl/Kcl 20 Meq  IV  





  AS DIRECT AYAKA  


 


Magnesium Hydroxide  30 ml  07/05/19 23:16 





  Milk Of Magnesia  PO  





  Q4H PRN  





  Constipation  


 


Metoclopramide HCl  10 mg  07/05/19 23:16  07/09/19 08:58





  Reglan  IV   10 mg





  Q6H PRN   Administration





  Nausea And Vomiting  


 


Morphine Sulfate  2 mg  07/06/19 23:19  07/08/19 17:24





  Morphine  IV   2 mg





  Q4H PRN   Administration





  Pain, Moderate (4-6)  


 


Multi-Ingred Cream/Lotion/Oil/Oint  1 applic  07/06/19 22:12 





  Artificial Tears Ophth Oint  OU  





  Q4HR PRN  





  Dry Eye(s)  


 


Multi-Ingredient Ointment  1 applic  07/05/19 23:16 





  Lansinoh  TP  





  PRN PRN  





  dryness/cracking  


 


Naloxone HCl  0.1 mg  07/05/19 23:16 





  Narcan 0.4 Mg/1 Ml  IV  





  Q2MIN PRN  





  Res Rate </= 8 or 02 SAT < 92%  


 


Pantoprazole Sodium  40 mg  07/09/19 15:00  07/10/19 09:49





  Protonix  IV   40 mg





  BID AYAKA   Administration


 


Sodium Chloride  10 ml  07/05/19 23:16  07/09/19 23:16





  Sodium Chloride Flush Syringe 10 Ml  IV   10 ml





  BID AYAKA   Administration


 


Sodium Chloride  10 ml  07/05/19 23:16 





  Sodium Chloride Flush Syringe 10 Ml  IV  





  PRN PRN  





  LINE FLUSH  


 


Witch Hazel/Glycerin  1 each  07/05/19 23:16 





  Tucks Pad  TP  





  PRN PRN  





  Hemorrhoids/cleansing/soothing

## 2019-07-11 LAB
ALBUMIN SERPL-MCNC: 2.7 G/DL (ref 3.9–5)
ALT SERPL-CCNC: 200 UNITS/L (ref 7–56)
BASOPHILS # (AUTO): 0 K/MM3 (ref 0–0.1)
BASOPHILS NFR BLD AUTO: 0.3 % (ref 0–1.8)
BILIRUB DIRECT SERPL-MCNC: 0.3 MG/DL (ref 0–0.2)
BUN SERPL-MCNC: 24 MG/DL (ref 7–17)
BUN/CREAT SERPL: 20 %
CALCIUM SERPL-MCNC: 8.2 MG/DL (ref 8.4–10.2)
EOSINOPHIL # BLD AUTO: 0.3 K/MM3 (ref 0–0.4)
EOSINOPHIL NFR BLD AUTO: 2.7 % (ref 0–4.3)
HCT VFR BLD CALC: 33.4 % (ref 30.3–42.9)
HEMOLYSIS INDEX: 5
HGB BLD-MCNC: 11.4 GM/DL (ref 10.1–14.3)
LYMPHOCYTES # BLD AUTO: 1.3 K/MM3 (ref 1.2–5.4)
LYMPHOCYTES NFR BLD AUTO: 11.5 % (ref 13.4–35)
MCHC RBC AUTO-ENTMCNC: 34 % (ref 30–34)
MCV RBC AUTO: 92 FL (ref 79–97)
MONOCYTES # (AUTO): 1.3 K/MM3 (ref 0–0.8)
MONOCYTES % (AUTO): 11.5 % (ref 0–7.3)
PLATELET # BLD: 144 K/MM3 (ref 140–440)
RBC # BLD AUTO: 3.64 M/MM3 (ref 3.65–5.03)

## 2019-07-11 PROCEDURE — 3E0334Z INTRODUCTION OF SERUM, TOXOID AND VACCINE INTO PERIPHERAL VEIN, PERCUTANEOUS APPROACH: ICD-10-PCS | Performed by: OBSTETRICS & GYNECOLOGY

## 2019-07-11 RX ADMIN — Medication SCH: at 23:46

## 2019-07-11 RX ADMIN — POTASSIUM CHLORIDE SCH MEQ: 1.5 POWDER, FOR SOLUTION ORAL at 11:03

## 2019-07-11 RX ADMIN — DEXTROSE SCH MLS/HR: 5 SOLUTION INTRAVENOUS at 03:26

## 2019-07-11 NOTE — PROGRESS NOTE
Assessment and Plan





S/P 


Acute Pulmonary edema


Acute renal failure


Acute respiratory failure


Elevated troponin


Pre-eclampsia


Peripartum cardiomyopathy





Echo reports a normal left ventricular size and function LVEF 45-50%.





Conservative cardiac management.


We will follow intermittently.





Subjective


Date of service: 19


Principal diagnosis: 1) POD #4 S/P 1LTCS  2)gestatinal hypertension 3) acute 

resp failur


Interval history: 





Patient has no cardiac complaints.


Stable sinus rhythm on telemetry.





Objective


                                   Vital Signs











  Temp Pulse Resp BP Pulse Ox


 


 19 08:31   69  19  153/80  98


 


 19 08:00   68  18  153/80  98


 


 19 07:36      100


 


 19 07:31   91 H  15  148/74  100


 


 19 07:00   101 H  19  156/92  100


 


 19 06:31   57 L  17  148/74  100


 


 19 06:00   70  19  148/74  100


 


 19 05:31   69  16  159/86  100


 


 19 05:00   75  18  159/86  100


 


 19 04:31   74  21  137/71  100


 


 19 04:00  98.0 F  84  15  137/71  100


 


 19 03:31   82  12  148/79  100


 


 19 03:00   83  17  148/79  100


 


 19 02:30   76  14  153/87  100


 


 19 02:00   76  16  153/87  100


 


 19 01:31   59 L  19  139/74  100


 


 19 01:00   69  19  136/79  100


 


 19 00:31   61  18  139/74  100


 


 19 00:01   65  18  139/74  100


 


 19 00:00  97.0 F L  61   


 


 07/10/19 23:31   62  15  170/84  100


 


 07/10/19 23:13   80  15  170/84  100


 


 07/10/19 23:00   66  16  170/84  100


 


 07/10/19 22:31   69  17  153/83  100


 


 07/10/19 22:00   80  23  153/83  100


 


 07/10/19 21:31   87  21  157/75  100


 


 07/10/19 21:00   73  17  157/75  100


 


 07/10/19 20:31   70  16  157/84  100


 


 07/10/19 20:00  98.1 F  70  18  157/84  100


 


 07/10/19 19:31   81  20  159/81  100


 


 07/10/19 19:00   90  36 H  159/81  100


 


 07/10/19 18:31   77  24  140/83  100


 


 07/10/19 18:01   93 H  32 H  140/83  100


 


 07/10/19 17:31   82  21  146/88  100


 


 07/10/19 17:00   96 H  26 H  146/88  100


 


 07/10/19 16:30   105 H  25 H  144/87  100


 


 07/10/19 16:00   103 H  17  150/100  100


 


 07/10/19 15:39  98.6 F    


 


 07/10/19 15:30   107 H  21  150/100  100


 


 07/10/19 15:00   104 H  21  157/94  99


 


 07/10/19 14:30   97 H  22  153/88 


 


 07/10/19 14:24   115 H  14  153/88  100


 


 07/10/19 13:30   96 H  21  153/88  100


 


 07/10/19 13:00   84  22  164/86  100


 


 07/10/19 12:30   77  20  146/75  100


 


 07/10/19 12:00   89  19  169/96  100


 


 07/10/19 11:55  98.4 F    


 


 07/10/19 11:30   86  22  165/89  100


 


 07/10/19 11:25      97














- Physical Examination


General: No Apparent Distress


HEENT: Positive: PERRL


Neck: Positive: trachea midline


Cardiac: Positive: Reg Rate and Rhythm


Lungs: Positive: Decreased Breath Sounds


Neuro: Positive: Grossly Intact


Abdomen: Positive: Other (post C/S surgery)


Extremities: Absent: edema





- Labs and Meds


                                 Cardiac Enzymes











  19 Range/Units





  04:09 


 


AST  112 H  (5-40)  units/L








                                       CBC











  19 Range/Units





  04:09 


 


WBC  11.5 H  (4.5-11.0)  K/mm3


 


RBC  3.64 L  (3.65-5.03)  M/mm3


 


Hgb  11.4  (10.1-14.3)  gm/dl


 


Hct  33.4  (30.3-42.9)  %


 


Plt Count  144  (140-440)  K/mm3


 


Lymph #  1.3  (1.2-5.4)  K/mm3


 


Mono #  1.3 H  (0.0-0.8)  K/mm3


 


Eos #  0.3  (0.0-0.4)  K/mm3


 


Baso #  0.0  (0.0-0.1)  K/mm3








                          Comprehensive Metabolic Panel











  19 Range/Units





  04:09 04:09 


 


Sodium  152 H   (137-145)  mmol/L


 


Potassium  3.2 L   (3.6-5.0)  mmol/L


 


Chloride  116.9 H   ()  mmol/L


 


Carbon Dioxide  23   (22-30)  mmol/L


 


BUN  24 H   (7-17)  mg/dL


 


Creatinine  1.2   (0.7-1.2)  mg/dL


 


Glucose  146 H   ()  mg/dL


 


Calcium  8.2 L   (8.4-10.2)  mg/dL


 


Direct Bilirubin   0.3 H  (0-0.2)  mg/dL


 


Indirect Bilirubin   0.4  mg/dL


 


AST   112 H  (5-40)  units/L


 


ALT   200 H  (7-56)  units/L


 


Alkaline Phosphatase   115  ()  units/L


 


Total Protein   5.6 L  (6.3-8.2)  g/dL


 


Albumin   2.7 L  (3.9-5)  g/dL

## 2019-07-11 NOTE — EVENT NOTE
Date: 07/11/19





With patient's complicated course, she will need telemetry which is unable to be

performed on [facility] unit. S/w Dr. Soto who agrees, will transfer to Surg unit. 

Patient and family informed of concerns and limitations on MB unit and that her 

care may be compromised on this unit. Apology given for having to move her 

again. They voiced understanding and agrees with plan of care.

## 2019-07-11 NOTE — PROGRESS NOTE
Assessment and Plan


Acute resp failure s/p intubated , placed on vent 


- likely ARDS with Pulmonary edema- noncardiogenic, pulmonary following


- Patient was given Lasix, Echo reports a normal left ventricular size and 

function LVEF 45-50%.


- s/p extubation on 





Nausea/vomiting with abdominal distension


- NPO, s/p NG suction on clamped, serial abdominal xry - shows distended bowels 

w/o free air


-  CT abdomen showed extensive colonic dilatation, will consult 





Labile blood pressure


Sinus tachycardia


s/p  on 19


GBS positive


History of herpes simplex 2


History of preeclampsia


NAZARIO probably ATN from hypotension


Leukocytosis





- Continue supportive care


Nephrology following, monitor BMP


Routine pulm toileting


Cardiology following


Pulmonology following


On Empiric Cefepime , ID following


DVT PPX on Lovenox





Patient is stable to to go to floor





Brief History:


38-year-old  female who is s/p  on 19.  Patient was 

transferred to IMCU for management of pulmonary edema.  Chest x-ray showed mild 

cardiomegaly and pulmonary edema. She was given lasix. On night of , worse 

respiratory distress, rapid response team called and she was intubated put on 

vent. Also now has NAZARIO, managed by nephrology. Pulmonary edema thought to be non

cardiogenic, likely ARDS. Patient transferred to ICU, extubated on . Now 

developed N/V/abdominal distension since the day of extubation. CT abdomen 

showed extensive colonic dilatation with gas, no obstruction, surgery consult 

placed. Can go to floor today. 





Hospitalist Physical


Gen: Not in acute distress, lying in bed, 


HEENT: Normocephalic, atraumatic


Neck: supple, no JVD


Heart: S1 and S2 reg, no murmurs, rubs or gallop


Lungs: no Bilateral crackles, no wheeze


Abd: soft, non tender, very distended, + BS


Ext: No edema, no clubbing, no cyanosis, 


Neuro: arouseable, follows commands, moves all ext











Subjective


Date of service: 19


Principal diagnosis: 1) POD #4 S/P 1LTCS  2)gestatinal hypertension 3) acute 

resp failur


Interval history: 





Patient seen and examined


no N/V, but abdomen still distended


updated family at bedside, updated











Objective





- Constitutional


Vitals: 


                               Vital Signs - 12hr











  07/11/19 07/11/19 07/11/19





  00:31 01:00 01:31


 


Temperature   


 


Pulse Rate 61 69 59 L


 


Respiratory 18 19 19





Rate   


 


Blood Pressure 139/74 136/79 139/74


 


O2 Sat by Pulse 100 100 100





Oximetry   














  19





  02:00 02:30 03:00


 


Temperature   


 


Pulse Rate 76 76 83


 


Respiratory 16 14 17





Rate   


 


Blood Pressure 153/87 153/87 148/79


 


O2 Sat by Pulse 100 100 100





Oximetry   














  19





  03:31 04:00 04:31


 


Temperature  98.0 F 


 


Pulse Rate 82 84 74


 


Respiratory 12 15 21





Rate   


 


Blood Pressure 148/79 137/71 137/71


 


O2 Sat by Pulse 100 100 100





Oximetry   














  19





  05:00 05:31 06:00


 


Temperature   


 


Pulse Rate 75 69 70


 


Respiratory 18 16 19





Rate   


 


Blood Pressure 159/86 159/86 148/74


 


O2 Sat by Pulse 100 100 100





Oximetry   














  19





  06:31 07:00 07:31


 


Temperature   


 


Pulse Rate 57 L 101 H 91 H


 


Respiratory 17 19 15





Rate   


 


Blood Pressure 148/74 156/92 148/74


 


O2 Sat by Pulse 100 100 100





Oximetry   














  19





  07:36 08:00 08:31


 


Temperature   


 


Pulse Rate  68 69


 


Respiratory  18 19





Rate   


 


Blood Pressure  153/80 153/80


 


O2 Sat by Pulse 100 98 98





Oximetry   














- Labs


CBC & Chem 7: 


                                 19 04:09





                                 19 05:08


Labs: 


                              Abnormal lab results











  07/10/19 07/11/19 07/11/19 Range/Units





  17:10 04:09 04:09 


 


WBC    11.5 H  (4.5-11.0)  K/mm3


 


RBC    3.64 L  (3.65-5.03)  M/mm3


 


RDW    17.9 H  (13.2-15.2)  %


 


Lymph % (Auto)    11.5 L  (13.4-35.0)  %


 


Mono % (Auto)    11.5 H  (0.0-7.3)  %


 


Mono #    1.3 H  (0.0-0.8)  K/mm3


 


Seg Neutrophils %    74.0 H  (40.0-70.0)  %


 


Seg Neutrophils #    8.5 H  (1.8-7.7)  K/mm3


 


Sodium   152 H   (137-145)  mmol/L


 


Potassium   3.2 L   (3.6-5.0)  mmol/L


 


Chloride   116.9 H   ()  mmol/L


 


BUN   24 H   (7-17)  mg/dL


 


Glucose   146 H   ()  mg/dL


 


POC Glucose  165 H    ()  


 


Calcium   8.2 L   (8.4-10.2)  mg/dL


 


Direct Bilirubin     (0-0.2)  mg/dL


 


AST     (5-40)  units/L


 


ALT     (7-56)  units/L


 


Total Protein     (6.3-8.2)  g/dL


 


Albumin     (3.9-5)  g/dL














  19 Range/Units





  04:09 05:32 


 


WBC    (4.5-11.0)  K/mm3


 


RBC    (3.65-5.03)  M/mm3


 


RDW    (13.2-15.2)  %


 


Lymph % (Auto)    (13.4-35.0)  %


 


Mono % (Auto)    (0.0-7.3)  %


 


Mono #    (0.0-0.8)  K/mm3


 


Seg Neutrophils %    (40.0-70.0)  %


 


Seg Neutrophils #    (1.8-7.7)  K/mm3


 


Sodium    (137-145)  mmol/L


 


Potassium    (3.6-5.0)  mmol/L


 


Chloride    ()  mmol/L


 


BUN    (7-17)  mg/dL


 


Glucose    ()  mg/dL


 


POC Glucose   141 H  ()  


 


Calcium    (8.4-10.2)  mg/dL


 


Direct Bilirubin  0.3 H   (0-0.2)  mg/dL


 


AST  112 H   (5-40)  units/L


 


ALT  200 H   (7-56)  units/L


 


Total Protein  5.6 L   (6.3-8.2)  g/dL


 


Albumin  2.7 L   (3.9-5)  g/dL

## 2019-07-11 NOTE — PROGRESS NOTE
Assessment and Plan





- Patient Problems


(1) Acute respiratory failure


Current Visit: Yes   Status: Acute   


Qualifiers: 


   Respiratory failure complication: hypoxia   Qualified Code(s): J96.01 - Acute

respiratory failure with hypoxia   





(2) Acute renal failure


Current Visit: Yes   Status: Acute   


Qualifiers: 


   Acute renal failure type: with acute tubular necrosis   Qualified Code(s): 

N17.0 - Acute kidney failure with tubular necrosis   





(3)  delivery delivered


Current Visit: Yes   Status: Acute   





(4) Pulmonary edema


Current Visit: Yes   Status: Acute   


Plan to address problem: 


improved








(5) Gestational hypertension


Current Visit: Yes   Status: Resolved   


Qualifiers: 


   Trimester: third trimester   Qualified Code(s): O13.3 - Gestational 

[pregnancy-induced] hypertension without significant proteinuria, third 

trimester   





(6) Hypokalemia


Current Visit: Yes   Status: Acute   





(7) Elevated LFTs


Current Visit: Yes   Status: Acute   


Plan to address problem: 


stable








(8) Elevated troponin


Current Visit: Yes   Status: Acute   





(9) Gestational diabetes


Current Visit: Yes   Status: Acute   





(10) Rh negative, delivered, current hospitalization


Current Visit: Yes   Status: Acute   





(11) 38 weeks gestation of pregnancy


Current Visit: Yes   Status: Ruled-out   





(12) Positive GBS test


Current Visit: Yes   Status: Acute   





Subjective





- Subjective


Date of service: 19


Principal diagnosis: 1) POD #6 S/P 1LTCS  2)gestatinal hypertension 3) acute 

resp failure


Interval history: 


Sitting in chair with NG and sipping water and apple juie, no complaints. No 

bleeding








Objective





- Vital Signs


Latest vital signs: 


                                   Vital Signs











  Temp Pulse Resp BP Pulse Ox


 


 19 13:31   96 H  21  132/89  97


 


 19 13:00   97 H  21  132/89  99


 


 19 12:31   103 H  14  125/86  98


 


 19 12:00   96 H  16  125/86  99


 


 19 11:31    18  137/92  99


 


 19 11:00   98 H  14  137/92  98


 


 19 10:43   81  18  


 


 19 10:00   78  18  151/86  99


 


 19 09:31   63  12  158/83  99


 


 19 09:00   76  22  158/83  98


 


 19 08:31   69  19  153/80  98


 


 19 08:00   68  18  153/80  98


 


 19 07:36      100


 


 19 07:31   91 H  15  148/74  100


 


 19 07:00   101 H  19  156/92  100


 


 19 06:31   57 L  17  148/74  100


 


 19 06:00   70  19  148/74  100


 


 19 05:31   69  16  159/86  100


 


 19 05:00   75  18  159/86  100


 


 19 04:31   74  21  137/71  100


 


 19 04:00  98.0 F  84  15  137/71  100


 


 19 03:31   82  12  148/79  100


 


 19 03:00   83  17  148/79  100


 


 19 02:30   76  14  153/87  100


 


 19 02:00   76  16  153/87  100


 


 19 01:31   59 L  19  139/74  100


 


 19 01:00   69  19  136/79  100


 


 19 00:31   61  18  139/74  100


 


 19 00:01   65  18  139/74  100


 


 19 00:00  97.0 F L  61   


 


 07/10/19 23:31   62  15  170/84  100


 


 07/10/19 23:13   80  15  170/84  100


 


 07/10/19 23:00   66  16  170/84  100


 


 07/10/19 22:31   69  17  153/83  100


 


 07/10/19 22:00   80  23  153/83  100


 


 07/10/19 21:31   87  21  157/75  100


 


 07/10/19 21:00   73  17  157/75  100


 


 07/10/19 20:31   70  16  157/84  100


 


 07/10/19 20:00  98.1 F  70  18  157/84  100


 


 07/10/19 19:31   81  20  159/81  100


 


 07/10/19 19:00   90  36 H  159/81  100


 


 07/10/19 18:31   77  24  140/83  100


 


 07/10/19 18:01   93 H  32 H  140/83  100


 


 07/10/19 17:31   82  21  146/88  100


 


 07/10/19 17:00   96 H  26 H  146/88  100


 


 07/10/19 16:30   105 H  25 H  144/87  100


 


 07/10/19 16:00   103 H  17  150/100  100


 


 07/10/19 15:39  98.6 F    


 


 07/10/19 15:30   107 H  21  150/100  100








                                Intake and Output











 19





 06:59 14:59 22:59


 


Intake Total 963.75  


 


Output Total 1000 250 


 


Balance -36.25 -250 


 


Intake:   


 


  .75  


 


    D5w 1,000 ml @ 75 mls/hr 963.75  





    IV AS DIRECT AYAKA Rx#:   





    [id number]   


 


  Oral 0  


 


Output:   


 


  Urine 1000 250 


 


    Indwelling Catheter 1000 250 


 


Other:   


 


  Total, Intake Amount 0  


 


  Total, Output Amount 1000 250 


 


  Voiding Method Indwelling Catheter Toilet 


 


  Weight   77.111 kg








                                 Patient Weight











 19





 06:59


 


Weight 77.111 kg














- Exam


Breasts: Present: deferred





- Labs


Labs: 


                              Abnormal lab results











  07/10/19 07/11/19 07/11/19 Range/Units





  17:10 04:09 04:09 


 


WBC    11.5 H  (4.5-11.0)  K/mm3


 


RBC    3.64 L  (3.65-5.03)  M/mm3


 


RDW    17.9 H  (13.2-15.2)  %


 


Lymph % (Auto)    11.5 L  (13.4-35.0)  %


 


Mono % (Auto)    11.5 H  (0.0-7.3)  %


 


Mono #    1.3 H  (0.0-0.8)  K/mm3


 


Seg Neutrophils %    74.0 H  (40.0-70.0)  %


 


Seg Neutrophils #    8.5 H  (1.8-7.7)  K/mm3


 


Sodium   152 H   (137-145)  mmol/L


 


Potassium   3.2 L   (3.6-5.0)  mmol/L


 


Chloride   116.9 H   ()  mmol/L


 


BUN   24 H   (7-17)  mg/dL


 


Glucose   146 H   ()  mg/dL


 


POC Glucose  165 H    ()  


 


Calcium   8.2 L   (8.4-10.2)  mg/dL


 


Direct Bilirubin     (0-0.2)  mg/dL


 


AST     (5-40)  units/L


 


ALT     (7-56)  units/L


 


Total Protein     (6.3-8.2)  g/dL


 


Albumin     (3.9-5)  g/dL














  19 Range/Units





  04:09 05:32 


 


WBC    (4.5-11.0)  K/mm3


 


RBC    (3.65-5.03)  M/mm3


 


RDW    (13.2-15.2)  %


 


Lymph % (Auto)    (13.4-35.0)  %


 


Mono % (Auto)    (0.0-7.3)  %


 


Mono #    (0.0-0.8)  K/mm3


 


Seg Neutrophils %    (40.0-70.0)  %


 


Seg Neutrophils #    (1.8-7.7)  K/mm3


 


Sodium    (137-145)  mmol/L


 


Potassium    (3.6-5.0)  mmol/L


 


Chloride    ()  mmol/L


 


BUN    (7-17)  mg/dL


 


Glucose    ()  mg/dL


 


POC Glucose   141 H  ()  


 


Calcium    (8.4-10.2)  mg/dL


 


Direct Bilirubin  0.3 H   (0-0.2)  mg/dL


 


AST  112 H   (5-40)  units/L


 


ALT  200 H   (7-56)  units/L


 


Total Protein  5.6 L   (6.3-8.2)  g/dL


 


Albumin  2.7 L   (3.9-5)  g/dL

## 2019-07-11 NOTE — PROGRESS NOTE
Assessment and Plan





Imp:


1. S/p  for intrauterine pregnancy at 38 weeks


2. Pulmonary edema -> probably combined cardiogenic and non-cardiogenic (rapid 

yet incomplete improvement), the former perhaps related to periods of elevated 

BP/IVFs and the latter of ? etiology (DDx includes aspiration, sepsis, AFE)


3. Acute respiratory failure, hypoxia


4. NAZARIO


5. Thrombocytopenia of ? etiology


6. Ileus


7. Hypernatremia


8. Hypokalemia


9. Transaminitis


10. Anion-gap metabolic acidosis, presumed related to #4 at this point


11. ? Acute pancreatitis





Rec:


1. Cultures are negative; CT a/p shows RLL infiltrate, possibly aspiration; 

completed a course of Cefipime per ID, and respiratory status is improving, so 

agree w/ holding further ABX for now


2. On RA; monitor respiratory status


3. SCDs while in bed; d/w RN


4. Would optimize blood pressure given grade III diastolic dysfunction noted in 

Echo report


5. Replete K, on hypotonic IVFs


6. Lipase elevated on 7/10/19, ? pancreatitis, repeat in AM; consider Abd US to 

further eval. biliary tree; has ileus -> NGT to NGA, NPO pending surgery eval.


7. Stable pulmonary-wise to move out of ICU to mother/baby unit





Plan of care reviewed w/ family in detail, they understand/agree; all questions 

have been answered





Subjective


Date of service: 19


Principal diagnosis: ARDS


Interval history: 





Extubated. On RA. No SOB, chest pain, wheezing, N/V. NG in place. OOB to chair.





Active Medications





Albuterol (Proventil)  2.5 mg IH Q4HRT PRN


   PRN Reason: Shortness Of Breath


Bisacodyl (Dulcolax)  10 mg KS QDAY PRN


   PRN Reason: constipation unrelieved by MOM


Hydralazine HCl (Apresoline)  10 mg IV Q4HR PRN


   PRN Reason: SBP > 160


   Last Admin: 19 23:16 Dose:  10 mg


   Documented by: 


Hydrophilic Ointment (Vaseline Lip Therapy)  1 applic TP Q2HR PRN


   PRN Reason: Dry Lips


Dextrose (D5w)  1,000 mls @ 75 mls/hr IV AS DIRECT AYAKA


   Last Admin: 19 03:26 Dose:  75 mls/hr


   Documented by: 


Lansoprazole (Prevacid Solutab)  30 mg FEEDTUBE BID [facility]


   Stop: 19 22:01


   Last Admin: 19 11:03 Dose:  30 mg


   Documented by: 


Magnesium Hydroxide (Milk Of Magnesia)  30 ml PO Q4H PRN


   PRN Reason: Constipation


Metoclopramide HCl (Reglan)  10 mg IV Q6H PRN


   PRN Reason: Nausea And Vomiting


   Last Admin: 19 08:58 Dose:  10 mg


   Documented by: 


Morphine Sulfate (Morphine)  2 mg IV Q4H PRN


   PRN Reason: Pain, Moderate (4-6)


   Last Admin: 19 17:24 Dose:  2 mg


   Documented by: 


Multi-Ingred Cream/Lotion/Oil/Oint (Artificial Tears Ophth Oint)  1 applic OU 

Q4HR PRN


   PRN Reason: Dry Eye(s)


Multi-Ingredient Ointment (Lansinoh)  1 applic TP PRN PRN


   PRN Reason: dryness/cracking


Naloxone HCl (Narcan 0.4 Mg/1 Ml)  0.1 mg IV Q2MIN PRN


   PRN Reason: Res Rate </= 8 or 02 SAT < 92%


Pantoprazole Sodium (Protonix)  40 mg PO BID [facility]


Potassium Chloride (Potassium Chloride)  40 meq FEEDTUBE Q4H [facility]


   Stop: 19 14:01


   Last Admin: 19 11:03 Dose:  40 meq


   Documented by: 


Sodium Chloride (Sodium Chloride Flush Syringe 10 Ml)  10 ml IV BID [facility]


   Last Admin: 07/10/19 13:22 Dose:  Not Given


   Documented by: 


Sodium Chloride (Sodium Chloride Flush Syringe 10 Ml)  10 ml IV PRN PRN


   PRN Reason: LINE FLUSH


Witch Hazel/Glycerin (Tucks Pad)  1 each TP PRN PRN


   PRN Reason: Hemorrhoids/cleansing/soothing











Objective


                               Vital Signs - 12hr











  19





  01:31 02:00 02:30


 


Temperature   


 


Pulse Rate 59 L 76 76


 


Respiratory 19 16 14





Rate   


 


Blood Pressure 139/74 153/87 153/87


 


O2 Sat by Pulse 100 100 100





Oximetry   














  19





  03:00 03:31 04:00


 


Temperature   98.0 F


 


Pulse Rate 83 82 84


 


Respiratory 17 12 15





Rate   


 


Blood Pressure 148/79 148/79 137/71


 


O2 Sat by Pulse 100 100 100





Oximetry   














  19





  04:31 05:00 05:31


 


Temperature   


 


Pulse Rate 74 75 69


 


Respiratory 21 18 16





Rate   


 


Blood Pressure 137/71 159/86 159/86


 


O2 Sat by Pulse 100 100 100





Oximetry   














  19





  06:00 06:31 07:00


 


Temperature   


 


Pulse Rate 70 57 L 101 H


 


Respiratory 19 17 19





Rate   


 


Blood Pressure 148/74 148/74 156/92


 


O2 Sat by Pulse 100 100 100





Oximetry   














  19





  07:31 07:36 08:00


 


Temperature   


 


Pulse Rate 91 H  68


 


Respiratory 15  18





Rate   


 


Blood Pressure 148/74  153/80


 


O2 Sat by Pulse 100 100 98





Oximetry   














  19





  08:31


 


Temperature 


 


Pulse Rate 69


 


Respiratory 19





Rate 


 


Blood Pressure 153/80


 


O2 Sat by Pulse 98





Oximetry 











Constitutional: no acute distress, alert


Eyes: non-icteric


ENT: oropharynx moist


Neck: supple


Effort: normal


Ascultation: Bilateral: clear


Cardiovascular: regular rate and rhythm (no mrg)


Gastrointestinal: normoactive bowel sounds, soft, other (distended, mildly TTP; 

no guarding/rebound)


Integumentary: normal


Extremities: no cyanosis, no edema, pink and warm


Neurologic: normal mental status, non-focal exam, pupils equal and round, CN II-

XII normal


Psychiatric: mood appropriate, affect normal


CBC and BMP: 


                                 19 04:09





                                 19 04:09


ABG, PT/INR, D-dimer: 


ABG











POC ABG pH  7.400  (7.35-7.45)   19  12:57    


 


POC ABG pCO2  30.6  (35-45)  L  19  12:57    


 


POC ABG pO2  125  ()  H  19  12:57    


 


POC ABG HCO3  19.0  (22-26 mml/L)   19  12:57    


 


POC ABG Total CO2  20  (23-27mmol/L)   19  12:57    


 


POC ABG O2 Sat  99   19  12:57    





PT/INR, D-dimer











PT  13.2 Sec. (12.2-14.9)   19  14:24    


 


INR  1.03  (0.87-1.13)   19  14:24    


 


  > 68911 ng/mlDDU (0-234)  H  19  14:24    








Abnormal lab findings: 


                                  Abnormal Labs











  19





  19:45 16:12 16:12


 


WBC  12.3 H  16.3 H 


 


RBC   


 


Hgb   


 


Hct   


 


MCHC   


 


RDW  16.9 H  16.6 H 


 


Plt Count   


 


Lymph % (Auto)   


 


Mono % (Auto)   


 


Lymph #   


 


Mono #   


 


Seg Neutrophils %   


 


Seg Neuts % (Manual)   


 


Lymphocytes % (Manual)   


 


Seg Neutrophils #   


 


Seg Neutrophils # Man   


 


APTT   


 


Fibrinogen   


 


D-Dimer   


 


POC ABG pH   


 


POC ABG pCO2   


 


POC ABG pO2   


 


Sodium   


 


Potassium   


 


Chloride   


 


Carbon Dioxide   


 


BUN   


 


Creatinine    0.5 L


 


Glucose   


 


POC Glucose   


 


Calcium   


 


Phosphorus   


 


Magnesium   


 


Direct Bilirubin   


 


AST   


 


ALT   


 


Lactate Dehydrogenase    248 H


 


Total Creatine Kinase   


 


CK-MB (CK-2)   


 


CK-MB (CK-2) Rel Index   


 


Troponin T   


 


C-Reactive Protein   


 


NT-Pro-B Natriuret Pep   


 


Total Protein   


 


Albumin   


 


Triglycerides   


 


Cholesterol   


 


HDL Cholesterol   


 


Lipase   


 


Urine WBC (Auto)   


 


Urine Creatinine   


 


Miscellaneous Test   














  19





  23:41 23:41 23:41


 


WBC  18.3 H  


 


RBC   


 


Hgb   


 


Hct   


 


MCHC   


 


RDW  16.6 H  


 


Plt Count   


 


Lymph % (Auto)  3.5 L  


 


Mono % (Auto)  7.7 H  


 


Lymph #  0.6 L  


 


Mono #  1.4 H  


 


Seg Neutrophils %  88.7 H  


 


Seg Neuts % (Manual)   


 


Lymphocytes % (Manual)   


 


Seg Neutrophils #  16.2 H  


 


Seg Neutrophils # Man   


 


APTT   


 


Fibrinogen   


 


D-Dimer   


 


POC ABG pH   


 


POC ABG pCO2   


 


POC ABG pO2   


 


Sodium   


 


Potassium   


 


Chloride   


 


Carbon Dioxide   


 


BUN   


 


Creatinine   


 


Glucose   


 


POC Glucose   


 


Calcium   


 


Phosphorus   


 


Magnesium   


 


Direct Bilirubin   


 


AST   


 


ALT   


 


Lactate Dehydrogenase   


 


Total Creatine Kinase   


 


CK-MB (CK-2)   


 


CK-MB (CK-2) Rel Index   


 


Troponin T    0.274 H*


 


C-Reactive Protein   


 


NT-Pro-B Natriuret Pep   5198 H 


 


Total Protein   


 


Albumin   


 


Triglycerides    540 H


 


Cholesterol    291 H


 


HDL Cholesterol    60 H


 


Lipase   


 


Urine WBC (Auto)   


 


Urine Creatinine   


 


Miscellaneous Test   














  19





  23:41 04:40 04:40


 


WBC   


 


RBC   


 


Hgb   


 


Hct   


 


MCHC   


 


RDW   


 


Plt Count   


 


Lymph % (Auto)   


 


Mono % (Auto)   


 


Lymph #   


 


Mono #   


 


Seg Neutrophils %   


 


Seg Neuts % (Manual)   


 


Lymphocytes % (Manual)   


 


Seg Neutrophils #   


 


Seg Neutrophils # Man   


 


APTT   


 


Fibrinogen   


 


D-Dimer   


 


POC ABG pH   


 


POC ABG pCO2   


 


POC ABG pO2   


 


Sodium   


 


Potassium   


 


Chloride    108.3 H


 


Carbon Dioxide    21 L


 


BUN   


 


Creatinine   


 


Glucose    195 H


 


POC Glucose   


 


Calcium    7.7 L


 


Phosphorus   


 


Magnesium   


 


Direct Bilirubin   


 


AST   


 


ALT   


 


Lactate Dehydrogenase   


 


Total Creatine Kinase  155 H  


 


CK-MB (CK-2)  11.4 H  


 


CK-MB (CK-2) Rel Index  7.3 H  


 


Troponin T   0.177 H* D 


 


C-Reactive Protein   


 


NT-Pro-B Natriuret Pep   


 


Total Protein   


 


Albumin   


 


Triglycerides   


 


Cholesterol   


 


HDL Cholesterol   


 


Lipase   


 


Urine WBC (Auto)   


 


Urine Creatinine   


 


Miscellaneous Test   














  19





  08:35 09:47 22:35


 


WBC    20.8 H


 


RBC   


 


Hgb   


 


Hct   


 


MCHC   


 


RDW    17.2 H


 


Plt Count   


 


Lymph % (Auto)   


 


Mono % (Auto)   


 


Lymph #   


 


Mono #   


 


Seg Neutrophils %   


 


Seg Neuts % (Manual)    79.0 H


 


Lymphocytes % (Manual)    12.0 L


 


Seg Neutrophils #   


 


Seg Neutrophils # Man    16.4 H


 


APTT   


 


Fibrinogen   


 


D-Dimer   


 


POC ABG pH   


 


POC ABG pCO2   


 


POC ABG pO2   


 


Sodium   


 


Potassium   


 


Chloride   


 


Carbon Dioxide   


 


BUN   


 


Creatinine   


 


Glucose   


 


POC Glucose   


 


Calcium   


 


Phosphorus   


 


Magnesium   


 


Direct Bilirubin   


 


AST   


 


ALT   


 


Lactate Dehydrogenase   


 


Total Creatine Kinase   


 


CK-MB (CK-2)   


 


CK-MB (CK-2) Rel Index   


 


Troponin T   0.157 H* 


 


C-Reactive Protein   


 


NT-Pro-B Natriuret Pep   


 


Total Protein   


 


Albumin   


 


Triglycerides   


 


Cholesterol   


 


HDL Cholesterol   


 


Lipase   


 


Urine WBC (Auto)  37.0 H  


 


Urine Creatinine   


 


Miscellaneous Test   














  19





  22:35 22:35 22:44


 


WBC   


 


RBC   


 


Hgb   


 


Hct   


 


MCHC   


 


RDW   


 


Plt Count   


 


Lymph % (Auto)   


 


Mono % (Auto)   


 


Lymph #   


 


Mono #   


 


Seg Neutrophils %   


 


Seg Neuts % (Manual)   


 


Lymphocytes % (Manual)   


 


Seg Neutrophils #   


 


Seg Neutrophils # Man   


 


APTT  20.7 L  


 


Fibrinogen   


 


D-Dimer   


 


POC ABG pH    7.046 L


 


POC ABG pCO2    > 70 H


 


POC ABG pO2    62 L


 


Sodium   


 


Potassium   


 


Chloride   


 


Carbon Dioxide   


 


BUN   


 


Creatinine   


 


Glucose   


 


POC Glucose   


 


Calcium   


 


Phosphorus   


 


Magnesium   


 


Direct Bilirubin   


 


AST   


 


ALT   


 


Lactate Dehydrogenase   


 


Total Creatine Kinase   267 H 


 


CK-MB (CK-2)   9.7 H 


 


CK-MB (CK-2) Rel Index   


 


Troponin T   0.313 H* D 


 


C-Reactive Protein   


 


NT-Pro-B Natriuret Pep   


 


Total Protein   


 


Albumin   


 


Triglycerides   


 


Cholesterol   


 


HDL Cholesterol   


 


Lipase   


 


Urine WBC (Auto)   


 


Urine Creatinine   


 


Miscellaneous Test   














  19





  00:03 01:24 04:28


 


WBC   


 


RBC   


 


Hgb   


 


Hct   


 


MCHC   


 


RDW   


 


Plt Count   


 


Lymph % (Auto)   


 


Mono % (Auto)   


 


Lymph #   


 


Mono #   


 


Seg Neutrophils %   


 


Seg Neuts % (Manual)   


 


Lymphocytes % (Manual)   


 


Seg Neutrophils #   


 


Seg Neutrophils # Man   


 


APTT   


 


Fibrinogen   


 


D-Dimer   


 


POC ABG pH   7.246 L 


 


POC ABG pCO2   47.0 H 


 


POC ABG pO2   72 L 


 


Sodium   


 


Potassium   


 


Chloride   


 


Carbon Dioxide   


 


BUN   


 


Creatinine   


 


Glucose   


 


POC Glucose  152 H  


 


Calcium   


 


Phosphorus   


 


Magnesium   


 


Direct Bilirubin   


 


AST   


 


ALT   


 


Lactate Dehydrogenase   


 


Total Creatine Kinase   


 


CK-MB (CK-2)   


 


CK-MB (CK-2) Rel Index   


 


Troponin T    0.740 H* D


 


C-Reactive Protein   


 


NT-Pro-B Natriuret Pep   


 


Total Protein   


 


Albumin   


 


Triglycerides   


 


Cholesterol   


 


HDL Cholesterol   


 


Lipase   


 


Urine WBC (Auto)   


 


Urine Creatinine   


 


Miscellaneous Test   














  19





  05:33 05:53 10:19


 


WBC   


 


RBC   


 


Hgb   


 


Hct   


 


MCHC   


 


RDW   


 


Plt Count   


 


Lymph % (Auto)   


 


Mono % (Auto)   


 


Lymph #   


 


Mono #   


 


Seg Neutrophils %   


 


Seg Neuts % (Manual)   


 


Lymphocytes % (Manual)   


 


Seg Neutrophils #   


 


Seg Neutrophils # Man   


 


APTT   


 


Fibrinogen   


 


D-Dimer   


 


POC ABG pH   7.328 L 


 


POC ABG pCO2   


 


POC ABG pO2   256 H 


 


Sodium   


 


Potassium   


 


Chloride   


 


Carbon Dioxide   


 


BUN   


 


Creatinine   


 


Glucose   


 


POC Glucose  153 H  


 


Calcium   


 


Phosphorus   


 


Magnesium   


 


Direct Bilirubin   


 


AST   


 


ALT   


 


Lactate Dehydrogenase   


 


Total Creatine Kinase   


 


CK-MB (CK-2)   


 


CK-MB (CK-2) Rel Index   


 


Troponin T   


 


C-Reactive Protein   


 


NT-Pro-B Natriuret Pep   


 


Total Protein   


 


Albumin   


 


Triglycerides   


 


Cholesterol   


 


HDL Cholesterol   


 


Lipase   


 


Urine WBC (Auto)   


 


Urine Creatinine    34.9 H


 


Miscellaneous Test   














  19





  10:38 10:38 12:43


 


WBC   


 


RBC   


 


Hgb   


 


Hct   


 


MCHC   


 


RDW   


 


Plt Count   


 


Lymph % (Auto)   


 


Mono % (Auto)   


 


Lymph #   


 


Mono #   


 


Seg Neutrophils %   


 


Seg Neuts % (Manual)   


 


Lymphocytes % (Manual)   


 


Seg Neutrophils #   


 


Seg Neutrophils # Man   


 


APTT   


 


Fibrinogen   


 


D-Dimer   


 


POC ABG pH   


 


POC ABG pCO2   


 


POC ABG pO2   


 


Sodium    134 L


 


Potassium   


 


Chloride   


 


Carbon Dioxide    18 L


 


BUN    21 H


 


Creatinine    3.0 H


 


Glucose    166 H


 


POC Glucose   


 


Calcium    7.6 L


 


Phosphorus   


 


Magnesium   


 


Direct Bilirubin   


 


AST   


 


ALT   


 


Lactate Dehydrogenase   


 


Total Creatine Kinase   431 H 


 


CK-MB (CK-2)   


 


CK-MB (CK-2) Rel Index   


 


Troponin T  0.473 H* D  


 


C-Reactive Protein   


 


NT-Pro-B Natriuret Pep    61653 H


 


Total Protein   


 


Albumin   


 


Triglycerides   


 


Cholesterol   


 


HDL Cholesterol   


 


Lipase   


 


Urine WBC (Auto)   


 


Urine Creatinine   


 


Miscellaneous Test   














  19





  12:43 13:02 14:11


 


WBC  16.1 H  


 


RBC  3.47 L  


 


Hgb   


 


Hct   


 


MCHC  35 H  


 


RDW  17.1 H  


 


Plt Count  129 L  


 


Lymph % (Auto)   


 


Mono % (Auto)   


 


Lymph #   


 


Mono #   


 


Seg Neutrophils %   


 


Seg Neuts % (Manual)   


 


Lymphocytes % (Manual)   


 


Seg Neutrophils #   


 


Seg Neutrophils # Man   


 


APTT   


 


Fibrinogen   


 


D-Dimer   


 


POC ABG pH   


 


POC ABG pCO2   


 


POC ABG pO2   


 


Sodium   


 


Potassium   


 


Chloride   


 


Carbon Dioxide   


 


BUN   


 


Creatinine   


 


Glucose   


 


POC Glucose   163 H 


 


Calcium   


 


Phosphorus   


 


Magnesium   


 


Direct Bilirubin   


 


AST   


 


ALT   


 


Lactate Dehydrogenase   


 


Total Creatine Kinase   


 


CK-MB (CK-2)   


 


CK-MB (CK-2) Rel Index   


 


Troponin T   


 


C-Reactive Protein    14.60 H


 


NT-Pro-B Natriuret Pep   


 


Total Protein   


 


Albumin   


 


Triglycerides   


 


Cholesterol   


 


HDL Cholesterol   


 


Lipase   


 


Urine WBC (Auto)   


 


Urine Creatinine   


 


Miscellaneous Test   














  19





  17:39 23:02 Unknown


 


WBC   


 


RBC   


 


Hgb   


 


Hct   


 


MCHC   


 


RDW   


 


Plt Count   


 


Lymph % (Auto)   


 


Mono % (Auto)   


 


Lymph #   


 


Mono #   


 


Seg Neutrophils %   


 


Seg Neuts % (Manual)   


 


Lymphocytes % (Manual)   


 


Seg Neutrophils #   


 


Seg Neutrophils # Man   


 


APTT   


 


Fibrinogen   


 


D-Dimer   


 


POC ABG pH   


 


POC ABG pCO2   


 


POC ABG pO2   


 


Sodium    134 L D


 


Potassium   


 


Chloride    97.9 L


 


Carbon Dioxide    18 L


 


BUN   


 


Creatinine    2.3 H D


 


Glucose    225 H


 


POC Glucose  162 H  155 H 


 


Calcium    7.4 L


 


Phosphorus   


 


Magnesium   


 


Direct Bilirubin   


 


AST   


 


ALT   


 


Lactate Dehydrogenase   


 


Total Creatine Kinase   


 


CK-MB (CK-2)   


 


CK-MB (CK-2) Rel Index   


 


Troponin T   


 


C-Reactive Protein   


 


NT-Pro-B Natriuret Pep   


 


Total Protein   


 


Albumin   


 


Triglycerides   


 


Cholesterol   


 


HDL Cholesterol   


 


Lipase   


 


Urine WBC (Auto)   


 


Urine Creatinine   


 


Miscellaneous Test   














  19





  03:44 03:44 05:07


 


WBC  14.8 H  


 


RBC  3.12 L  


 


Hgb  9.8 L  


 


Hct  28.5 L  


 


MCHC   


 


RDW  17.5 H  


 


Plt Count  127 L  


 


Lymph % (Auto)   


 


Mono % (Auto)   


 


Lymph #   


 


Mono #   


 


Seg Neutrophils %   


 


Seg Neuts % (Manual)   


 


Lymphocytes % (Manual)   


 


Seg Neutrophils #   


 


Seg Neutrophils # Man   


 


APTT   


 


Fibrinogen   


 


D-Dimer   


 


POC ABG pH   


 


POC ABG pCO2   


 


POC ABG pO2    114 H


 


Sodium   


 


Potassium   3.0 L 


 


Chloride   


 


Carbon Dioxide   18 L 


 


BUN   28 H 


 


Creatinine   3.1 H 


 


Glucose   139 H 


 


POC Glucose   


 


Calcium   7.7 L 


 


Phosphorus   


 


Magnesium   


 


Direct Bilirubin   


 


AST   


 


ALT   


 


Lactate Dehydrogenase   


 


Total Creatine Kinase   


 


CK-MB (CK-2)   


 


CK-MB (CK-2) Rel Index   


 


Troponin T   


 


C-Reactive Protein   


 


NT-Pro-B Natriuret Pep   


 


Total Protein   5.3 L 


 


Albumin   2.5 L 


 


Triglycerides   


 


Cholesterol   


 


HDL Cholesterol   


 


Lipase   


 


Urine WBC (Auto)   


 


Urine Creatinine   


 


Miscellaneous Test   














  19





  05:36 05:37 12:45


 


WBC   


 


RBC   


 


Hgb   


 


Hct   


 


MCHC   


 


RDW   


 


Plt Count   


 


Lymph % (Auto)   


 


Mono % (Auto)   


 


Lymph #   


 


Mono #   


 


Seg Neutrophils %   


 


Seg Neuts % (Manual)   


 


Lymphocytes % (Manual)   


 


Seg Neutrophils #   


 


Seg Neutrophils # Man   


 


APTT   


 


Fibrinogen   


 


D-Dimer   


 


POC ABG pH   


 


POC ABG pCO2   


 


POC ABG pO2   


 


Sodium   


 


Potassium   


 


Chloride   


 


Carbon Dioxide   


 


BUN   


 


Creatinine   


 


Glucose   


 


POC Glucose   151 H  170 H


 


Calcium   


 


Phosphorus   


 


Magnesium   


 


Direct Bilirubin   


 


AST   


 


ALT   


 


Lactate Dehydrogenase   


 


Total Creatine Kinase   


 


CK-MB (CK-2)   


 


CK-MB (CK-2) Rel Index   


 


Troponin T   


 


C-Reactive Protein   


 


NT-Pro-B Natriuret Pep   


 


Total Protein   


 


Albumin   


 


Triglycerides   


 


Cholesterol   


 


HDL Cholesterol   


 


Lipase   


 


Urine WBC (Auto)   


 


Urine Creatinine   


 


Miscellaneous Test  Flexitest 1 H  














  19





  14:24 18:05 23:41


 


WBC   


 


RBC   


 


Hgb   


 


Hct   


 


MCHC   


 


RDW   


 


Plt Count   


 


Lymph % (Auto)   


 


Mono % (Auto)   


 


Lymph #   


 


Mono #   


 


Seg Neutrophils %   


 


Seg Neuts % (Manual)   


 


Lymphocytes % (Manual)   


 


Seg Neutrophils #   


 


Seg Neutrophils # Man   


 


APTT  21.7 L  


 


Fibrinogen  586 H  


 


D-Dimer  > 85088 H  


 


POC ABG pH   


 


POC ABG pCO2   


 


POC ABG pO2   


 


Sodium   


 


Potassium   


 


Chloride   


 


Carbon Dioxide   


 


BUN   


 


Creatinine   


 


Glucose   


 


POC Glucose   168 H  149 H


 


Calcium   


 


Phosphorus   


 


Magnesium   


 


Direct Bilirubin   


 


AST   


 


ALT   


 


Lactate Dehydrogenase   


 


Total Creatine Kinase   


 


CK-MB (CK-2)   


 


CK-MB (CK-2) Rel Index   


 


Troponin T   


 


C-Reactive Protein   


 


NT-Pro-B Natriuret Pep   


 


Total Protein   


 


Albumin   


 


Triglycerides   


 


Cholesterol   


 


HDL Cholesterol   


 


Lipase   


 


Urine WBC (Auto)   


 


Urine Creatinine   


 


Miscellaneous Test   














  19





  04:13 04:29 04:29


 


WBC   17.7 H 


 


RBC   


 


Hgb   


 


Hct   


 


MCHC   


 


RDW   17.3 H 


 


Plt Count   


 


Lymph % (Auto)   5.9 L 


 


Mono % (Auto)   8.2 H 


 


Lymph #   1.0 L 


 


Mono #   1.4 H 


 


Seg Neutrophils %   85.6 H 


 


Seg Neuts % (Manual)   


 


Lymphocytes % (Manual)   


 


Seg Neutrophils #   15.2 H 


 


Seg Neutrophils # Man   


 


APTT   


 


Fibrinogen   


 


D-Dimer   


 


POC ABG pH   


 


POC ABG pCO2  < 30 L  


 


POC ABG pO2  122 H  


 


Sodium    146 H D


 


Potassium    2.8 L*


 


Chloride    108.9 H


 


Carbon Dioxide    17 L


 


BUN    33 H


 


Creatinine    2.3 H


 


Glucose    146 H


 


POC Glucose   


 


Calcium   


 


Phosphorus   


 


Magnesium   


 


Direct Bilirubin   


 


AST    82 H


 


ALT    83 H


 


Lactate Dehydrogenase   


 


Total Creatine Kinase   


 


CK-MB (CK-2)   


 


CK-MB (CK-2) Rel Index   


 


Troponin T   


 


C-Reactive Protein   


 


NT-Pro-B Natriuret Pep   


 


Total Protein    6.2 L


 


Albumin    2.6 L


 


Triglycerides   


 


Cholesterol   


 


HDL Cholesterol   


 


Lipase   


 


Urine WBC (Auto)   


 


Urine Creatinine   


 


Miscellaneous Test   














  19





  05:26 12:33 12:57


 


WBC   


 


RBC   


 


Hgb   


 


Hct   


 


MCHC   


 


RDW   


 


Plt Count   


 


Lymph % (Auto)   


 


Mono % (Auto)   


 


Lymph #   


 


Mono #   


 


Seg Neutrophils %   


 


Seg Neuts % (Manual)   


 


Lymphocytes % (Manual)   


 


Seg Neutrophils #   


 


Seg Neutrophils # Man   


 


APTT   


 


Fibrinogen   


 


D-Dimer   


 


POC ABG pH   


 


POC ABG pCO2    30.6 L


 


POC ABG pO2    125 H


 


Sodium   


 


Potassium   


 


Chloride   


 


Carbon Dioxide   


 


BUN   


 


Creatinine   


 


Glucose   


 


POC Glucose  138 H  140 H 


 


Calcium   


 


Phosphorus   


 


Magnesium   


 


Direct Bilirubin   


 


AST   


 


ALT   


 


Lactate Dehydrogenase   


 


Total Creatine Kinase   


 


CK-MB (CK-2)   


 


CK-MB (CK-2) Rel Index   


 


Troponin T   


 


C-Reactive Protein   


 


NT-Pro-B Natriuret Pep   


 


Total Protein   


 


Albumin   


 


Triglycerides   


 


Cholesterol   


 


HDL Cholesterol   


 


Lipase   


 


Urine WBC (Auto)   


 


Urine Creatinine   


 


Miscellaneous Test   














  19





  18:22 19:02 19:02


 


WBC   


 


RBC   


 


Hgb   


 


Hct   


 


MCHC   


 


RDW   


 


Plt Count   


 


Lymph % (Auto)   


 


Mono % (Auto)   


 


Lymph #   


 


Mono #   


 


Seg Neutrophils %   


 


Seg Neuts % (Manual)   


 


Lymphocytes % (Manual)   


 


Seg Neutrophils #   


 


Seg Neutrophils # Man   


 


APTT   


 


Fibrinogen   


 


D-Dimer   


 


POC ABG pH   


 


POC ABG pCO2   


 


POC ABG pO2   


 


Sodium   


 


Potassium   3.5 L D  3.5 L


 


Chloride   111.4 H 


 


Carbon Dioxide   17 L 


 


BUN   32 H 


 


Creatinine   1.7 H 


 


Glucose   156 H 


 


POC Glucose  156 H  


 


Calcium   


 


Phosphorus   


 


Magnesium   


 


Direct Bilirubin   


 


AST   


 


ALT   


 


Lactate Dehydrogenase   


 


Total Creatine Kinase   


 


CK-MB (CK-2)   


 


CK-MB (CK-2) Rel Index   


 


Troponin T   


 


C-Reactive Protein   


 


NT-Pro-B Natriuret Pep   


 


Total Protein   


 


Albumin   


 


Triglycerides   


 


Cholesterol   


 


HDL Cholesterol   


 


Lipase   


 


Urine WBC (Auto)   


 


Urine Creatinine   


 


Miscellaneous Test   














  07/10/19 07/10/19 07/10/19





  03:55 03:55 03:55


 


WBC   


 


RBC   


 


Hgb   


 


Hct   


 


MCHC   


 


RDW   


 


Plt Count   


 


Lymph % (Auto)   


 


Mono % (Auto)   


 


Lymph #   


 


Mono #   


 


Seg Neutrophils %   


 


Seg Neuts % (Manual)   


 


Lymphocytes % (Manual)   


 


Seg Neutrophils #   


 


Seg Neutrophils # Man   


 


APTT   


 


Fibrinogen   


 


D-Dimer   


 


POC ABG pH   


 


POC ABG pCO2   


 


POC ABG pO2   


 


Sodium  150 H  


 


Potassium  3.1 L  


 


Chloride  115.2 H  


 


Carbon Dioxide  20 L  


 


BUN  31 H  


 


Creatinine  1.6 H  


 


Glucose  139 H  


 


POC Glucose   


 


Calcium   


 


Phosphorus    2.20 L


 


Magnesium    2.40 H


 


Direct Bilirubin   0.4 H 


 


AST   120 H 


 


ALT   175 H 


 


Lactate Dehydrogenase   


 


Total Creatine Kinase   


 


CK-MB (CK-2)   


 


CK-MB (CK-2) Rel Index   


 


Troponin T   


 


C-Reactive Protein   


 


NT-Pro-B Natriuret Pep   


 


Total Protein   5.6 L 


 


Albumin   2.8 L 


 


Triglycerides   


 


Cholesterol   


 


HDL Cholesterol   


 


Lipase   170 H 


 


Urine WBC (Auto)   


 


Urine Creatinine   


 


Miscellaneous Test   














  07/10/19 07/10/19 07/10/19





  06:04 11:34 17:10


 


WBC   


 


RBC   


 


Hgb   


 


Hct   


 


MCHC   


 


RDW   


 


Plt Count   


 


Lymph % (Auto)   


 


Mono % (Auto)   


 


Lymph #   


 


Mono #   


 


Seg Neutrophils %   


 


Seg Neuts % (Manual)   


 


Lymphocytes % (Manual)   


 


Seg Neutrophils #   


 


Seg Neutrophils # Man   


 


APTT   


 


Fibrinogen   


 


D-Dimer   


 


POC ABG pH   


 


POC ABG pCO2   


 


POC ABG pO2   


 


Sodium   


 


Potassium   


 


Chloride   


 


Carbon Dioxide   


 


BUN   


 


Creatinine   


 


Glucose   


 


POC Glucose  125 H  139 H  165 H


 


Calcium   


 


Phosphorus   


 


Magnesium   


 


Direct Bilirubin   


 


AST   


 


ALT   


 


Lactate Dehydrogenase   


 


Total Creatine Kinase   


 


CK-MB (CK-2)   


 


CK-MB (CK-2) Rel Index   


 


Troponin T   


 


C-Reactive Protein   


 


NT-Pro-B Natriuret Pep   


 


Total Protein   


 


Albumin   


 


Triglycerides   


 


Cholesterol   


 


HDL Cholesterol   


 


Lipase   


 


Urine WBC (Auto)   


 


Urine Creatinine   


 


Miscellaneous Test   














  19





  04:09 04:09 04:09


 


WBC   11.5 H 


 


RBC   3.64 L 


 


Hgb   


 


Hct   


 


MCHC   


 


RDW   17.9 H 


 


Plt Count   


 


Lymph % (Auto)   11.5 L 


 


Mono % (Auto)   11.5 H 


 


Lymph #   


 


Harper #   1.3 H 


 


Seg Neutrophils %   74.0 H 


 


Seg Neuts % (Manual)   


 


Lymphocytes % (Manual)   


 


Seg Neutrophils #   8.5 H 


 


Seg Neutrophils # Man   


 


APTT   


 


Fibrinogen   


 


D-Dimer   


 


POC ABG pH   


 


POC ABG pCO2   


 


POC ABG pO2   


 


Sodium  152 H  


 


Potassium  3.2 L  


 


Chloride  116.9 H  


 


Carbon Dioxide   


 


BUN  24 H  


 


Creatinine   


 


Glucose  146 H  


 


POC Glucose   


 


Calcium  8.2 L  


 


Phosphorus   


 


Magnesium   


 


Direct Bilirubin    0.3 H


 


AST    112 H


 


ALT    200 H


 


Lactate Dehydrogenase   


 


Total Creatine Kinase   


 


CK-MB (CK-2)   


 


CK-MB (CK-2) Rel Index   


 


Troponin T   


 


C-Reactive Protein   


 


NT-Pro-B Natriuret Pep   


 


Total Protein    5.6 L


 


Albumin    2.7 L


 


Triglycerides   


 


Cholesterol   


 


HDL Cholesterol   


 


Lipase   


 


Urine WBC (Auto)   


 


Urine Creatinine   


 


Miscellaneous Test   














  19





  05:32


 


WBC 


 


RBC 


 


Hgb 


 


Hct 


 


MCHC 


 


RDW 


 


Plt Count 


 


Lymph % (Auto) 


 


Mono % (Auto) 


 


Lymph # 


 


Mono # 


 


Seg Neutrophils % 


 


Seg Neuts % (Manual) 


 


Lymphocytes % (Manual) 


 


Seg Neutrophils # 


 


Seg Neutrophils # Man 


 


APTT 


 


Fibrinogen 


 


D-Dimer 


 


POC ABG pH 


 


POC ABG pCO2 


 


POC ABG pO2 


 


Sodium 


 


Potassium 


 


Chloride 


 


Carbon Dioxide 


 


BUN 


 


Creatinine 


 


Glucose 


 


POC Glucose  141 H


 


Calcium 


 


Phosphorus 


 


Magnesium 


 


Direct Bilirubin 


 


AST 


 


ALT 


 


Lactate Dehydrogenase 


 


Total Creatine Kinase 


 


CK-MB (CK-2) 


 


CK-MB (CK-2) Rel Index 


 


Troponin T 


 


C-Reactive Protein 


 


NT-Pro-B Natriuret Pep 


 


Total Protein 


 


Albumin 


 


Triglycerides 


 


Cholesterol 


 


HDL Cholesterol 


 


Lipase 


 


Urine WBC (Auto) 


 


Urine Creatinine 


 


Miscellaneous Test 











Chest x-ray: report reviewed, image reviewed

## 2019-07-11 NOTE — EVENT NOTE
Date: 07/11/19





Patient's voiced concern for Rhogam adminstration and renal impairment. 

Explained such risks is low however risks for Rh isoimmunization could be 

significant to the point where she may not be able to get pregnant, may have 

recurrent SAB in the first or second trimester as well as to ensure she does not

make antibodies that may make obtaining a blood transfusion in the future 

difficult.  Offered to allow her to speak with Nephrologist tomorrow, he states 

ok will proceed with Rhogam.

## 2019-07-11 NOTE — PROGRESS NOTE
Assessment and Plan


Impression:


* NAZARIO with ATN


* sepsis


* ARDS


* post partum


* hypoxemia


* Hypernatremia


* Hypokalemia





Plan:


* Renal function continues to improve.  Serum creatinine down to 1.2 today.  


* Patient remains nonoliguric.  Approximately 1600 mL of urine recorded 

  yesterday.


* keep MAP >65


* avoid nephrotoxins


* Replace potassium


* Serum sodium appears to be rising.  Continue IV fluids with D5W.  Urine sodium

  is 39 and urine osmolality is 492 


* Hold diuretics for now 


* No indication for renal replacement therapy at this time


* Discussed with patient's  and mother at bedside











Subjective


Date of service: 07/11/19


Principal diagnosis: 1) POD #4 S/P 1LTCS  2)gestatinal hypertension 3) acute 

resp failur


Interval history: 


Patient remains in the ICU.  He remains extubated.  Having some ice chips.  

Denies any shortness of breath.  Denies any nausea vomiting or diarrhea .








Objective





- Vital Signs


Vital signs: 


                               Vital Signs - 12hr











  07/10/19 07/10/19 07/10/19





  21:00 21:31 22:00


 


Temperature   


 


Pulse Rate 73 87 80


 


Respiratory 17 21 23





Rate   


 


Blood Pressure 157/75 157/75 153/83


 


O2 Sat by Pulse 100 100 100





Oximetry   














  07/10/19 07/10/19 07/10/19





  22:31 23:00 23:13


 


Temperature   


 


Pulse Rate 69 66 80


 


Respiratory 17 16 15





Rate   


 


Blood Pressure 153/83 170/84 170/84


 


O2 Sat by Pulse 100 100 100





Oximetry   














  07/10/19 07/11/19 07/11/19





  23:31 00:00 00:01


 


Temperature  97.0 F L 


 


Pulse Rate 62 61 65


 


Respiratory 15  18





Rate   


 


Blood Pressure 170/84  139/74


 


O2 Sat by Pulse 100  100





Oximetry   














  07/11/19 07/11/19 07/11/19





  00:31 01:00 01:31


 


Temperature   


 


Pulse Rate 61 69 59 L


 


Respiratory 18 19 19





Rate   


 


Blood Pressure 139/74 136/79 139/74


 


O2 Sat by Pulse 100 100 100





Oximetry   














  07/11/19 07/11/19 07/11/19





  02:00 02:30 03:00


 


Temperature   


 


Pulse Rate 76 76 83


 


Respiratory 16 14 17





Rate   


 


Blood Pressure 153/87 153/87 148/79


 


O2 Sat by Pulse 100 100 100





Oximetry   














  07/11/19 07/11/19 07/11/19





  03:31 04:00 04:31


 


Temperature  98.0 F 


 


Pulse Rate 82 84 74


 


Respiratory 12 15 21





Rate   


 


Blood Pressure 148/79 137/71 137/71


 


O2 Sat by Pulse 100 100 100





Oximetry   














  07/11/19 07/11/19 07/11/19





  05:00 05:31 06:00


 


Temperature   


 


Pulse Rate 75 69 70


 


Respiratory 18 16 19





Rate   


 


Blood Pressure 159/86 159/86 148/74


 


O2 Sat by Pulse 100 100 100





Oximetry   














  07/11/19





  07:36


 


Temperature 


 


Pulse Rate 


 


Respiratory 





Rate 


 


Blood Pressure 


 


O2 Sat by Pulse 100





Oximetry 














- General Appearance


General appearance: well-developed, well-nourished, appears stated age


EENT: PERRL, mucous membranes moist


Neck: no JVD, no thyromegaly, no carotid bruit, supple


Respiratory: Present: Clear to Ascultation


Cardiology: regular, normal heart rate, S1S2, no murmurs


Gastrointestinal: normal, hypoactive bowel sounds, other (hypogastric incision 

noted)


Integumentary: other (trace edema)





- Lab





                                 07/11/19 04:09





                                 07/11/19 04:09


                             Most recent lab results











Calcium  8.2 mg/dL (8.4-10.2)  L  07/11/19  04:09    


 


Phosphorus  2.20 mg/dL (2.5-4.5)  L  07/10/19  03:55    


 


Magnesium  2.40 mg/dL (1.7-2.3)  H  07/10/19  03:55    


 


  34.9 mg/dL (0.1-20.0)  H  07/07/19  10:19    


 


  39 mmol/L  07/10/19  16:40    














Medications & Allergies





- Medications


Allergies/Adverse Reactions: 


                                    Allergies





No Known Allergies Allergy (Verified 07/04/19 19:31)


   








Home Medications: 


                                Home Medications











 Medication  Instructions  Recorded  Confirmed  Last Taken  Type


 


No Known Home Medications [No  07/05/19 07/05/19 Unknown History





Reported Home Medications]     











Active Medications: 














Generic Name Dose Route Start Last Admin





  Trade Name Freq  PRN Reason Stop Dose Admin


 


Albuterol  2.5 mg  07/10/19 00:12 





  Proventil  IH  





  Q4HRT PRN  





  Shortness Of Breath  


 


Bisacodyl  10 mg  07/05/19 23:16 





  Dulcolax  WA  





  QDAY PRN  





  constipation unrelieved by MOM  


 


Hydralazine HCl  10 mg  07/09/19 03:30  07/09/19 23:16





  Apresoline  IV   10 mg





  Q4HR PRN   Administration





  SBP > 160  


 


Hydrophilic Ointment  1 applic  07/06/19 22:12 





  Vaseline Lip Therapy  TP  





  Q2HR PRN  





  Dry Lips  


 


Dextrose  1,000 mls @ 75 mls/hr  07/10/19 10:00  07/11/19 03:26





  D5w  IV   75 mls/hr





  AS DIRECT AYAKA   Administration


 


Potassium Chloride  10 meq in 100 mls @ 100 mls/hr  07/11/19 09:00 





  Kcl 10meq/100ml  IV  07/11/19 14:59 





  Q1H AYAKA  


 


Magnesium Hydroxide  30 ml  07/05/19 23:16 





  Milk Of Magnesia  PO  





  Q4H PRN  





  Constipation  


 


Metoclopramide HCl  10 mg  07/05/19 23:16  07/09/19 08:58





  Reglan  IV   10 mg





  Q6H PRN   Administration





  Nausea And Vomiting  


 


Morphine Sulfate  2 mg  07/06/19 23:19  07/08/19 17:24





  Morphine  IV   2 mg





  Q4H PRN   Administration





  Pain, Moderate (4-6)  


 


Multi-Ingred Cream/Lotion/Oil/Oint  1 applic  07/06/19 22:12 





  Artificial Tears Ophth Oint  OU  





  Q4HR PRN  





  Dry Eye(s)  


 


Multi-Ingredient Ointment  1 applic  07/05/19 23:16 





  Lansinoh  TP  





  PRN PRN  





  dryness/cracking  


 


Naloxone HCl  0.1 mg  07/05/19 23:16 





  Narcan 0.4 Mg/1 Ml  IV  





  Q2MIN PRN  





  Res Rate </= 8 or 02 SAT < 92%  


 


Pantoprazole Sodium  40 mg  07/09/19 15:00  07/10/19 22:25





  Protonix  IV   40 mg





  BID AYAKA   Administration


 


Sodium Chloride  10 ml  07/05/19 23:16  07/10/19 13:22





  Sodium Chloride Flush Syringe 10 Ml  IV   Not Given





  BID AYAKA  


 


Sodium Chloride  10 ml  07/05/19 23:16 





  Sodium Chloride Flush Syringe 10 Ml  IV  





  PRN PRN  





  LINE FLUSH  


 


Witch Hazel/Glycerin  1 each  07/05/19 23:16 





  Tucks Pad  TP  





  PRN PRN  





  Hemorrhoids/cleansing/soothing

## 2019-07-11 NOTE — PROGRESS NOTE
Assessment and Plan


Cultures:


Blood cultures 2019 no growth. 


Urine culture 2019 no growth.  


Resp culture: no growth





Assessment: 


37 y/o female with history of preeclampsia admitted on 2019 with 38 weeks 

gestation with uterine contractions. Patient underwent  on 19 due 

to failure to dilate, pulmonary edema and gestational hypertension;  after C-

section she developed acute respiratory failure/ARDS and hypotension, now 

intubated: 





1) Leukocytosis: likely reactive from labor/ and respiratory failure. 

No evidence of infectious process thus far. CXR Chest x-ray showed mild 

cardiomegaly and pulmonary edema. No consolidations. Repeat appears better. CRP:

14.6. DVT scan negative.





2) Acute respiratory failure: CXR with pulmonary edema likely from pre-

emclapsia. Doubt pneumonia. No recent vomiting, doubt aspiration.  





3) NAZARIO: renal on board. Improving creatinine.





4) ?ileus: GYN following.





5) ?RLL infiltrate noted on CT abdomen lower lung cuts: small infiltrate. 

Patient already received about 4-5 days of abx. Off oxygen, WBC improving. 

continue to avoid additional abx. D/w Dr. Nunn yesterday evening.





Recommendations:


- Off oxygen, WBC improving, Resp culture with no growth. Already received about

4-5 days of abx


- Continue to monitor off antibiotics








Bronwyn Burr MD, FACP


[facility] ([facility])


C: [phone]


O: [phone]


F: [phone]








Subjective


Date of service: 19


Principal diagnosis: 1) POD #4 S/P 1LTCS  2)gestatinal hypertension 3) acute 

resp failur


Interval history: 





Sitting up in the chair. Feels like she almost passed gas. Off oxygen. No BM 

yet. No fever.





Objective





- Exam


Narrative Exam: 





Physical Exam: 


Constitutional: awake, alert, off oxygen.


Head, Ears, Nose: Normocephalic, atraumatic. External ears, nose normal


Eyes: Conjunctivae/corneas clear. No icterus. No ptosis.


Neck: Supple, no meningeal signs


Cardiovascular: S1, S2 normal. 


Respiratory: Good air entry, clear to auscultation bilaterally


GI: soft, mild tenderness; bowel sounds hypo. No peritoneal signs. Surgical 

incision c/d/i


Musculoskeletal: No pedal edema, no cyanosis.


Skin: No rash or abscess


Hem/Lymphatic: No palpable cervical or supraclavicular nodes. No lymphangitis


Psych: calm. 


Neurological: awake, alert, oriented





- Constitutional


Vitals: 


                                   Vital Signs











Temp Pulse Resp BP Pulse Ox


 


 98.0 F   69   19   153/80   98 


 


 19 04:00  19 08:31  19 08:31  19 08:31  19 08:31








                           Temperature -Last 24 Hours











Temperature                    98.0 F


 


Temperature                    97.0 F


 


Temperature                    98.1 F


 


Temperature                    98.6 F


 


Temperature                    98.4 F

















- Labs


CBC & Chem 7: 


                                 19 04:09





                                 19 04:09


Labs: 


                              Abnormal lab results











  07/10/19 07/10/19 07/11/19 Range/Units





  11:34 17:10 04:09 


 


WBC     (4.5-11.0)  K/mm3


 


RBC     (3.65-5.03)  M/mm3


 


RDW     (13.2-15.2)  %


 


Lymph % (Auto)     (13.4-35.0)  %


 


Mono % (Auto)     (0.0-7.3)  %


 


Mono #     (0.0-0.8)  K/mm3


 


Seg Neutrophils %     (40.0-70.0)  %


 


Seg Neutrophils #     (1.8-7.7)  K/mm3


 


Sodium    152 H  (137-145)  mmol/L


 


Potassium    3.2 L  (3.6-5.0)  mmol/L


 


Chloride    116.9 H  ()  mmol/L


 


BUN    24 H  (7-17)  mg/dL


 


Glucose    146 H  ()  mg/dL


 


POC Glucose  139 H  165 H   ()  


 


Calcium    8.2 L  (8.4-10.2)  mg/dL


 


Direct Bilirubin     (0-0.2)  mg/dL


 


AST     (5-40)  units/L


 


ALT     (7-56)  units/L


 


Total Protein     (6.3-8.2)  g/dL


 


Albumin     (3.9-5)  g/dL














  19 Range/Units





  04:09 04:09 05:32 


 


WBC  11.5 H    (4.5-11.0)  K/mm3


 


RBC  3.64 L    (3.65-5.03)  M/mm3


 


RDW  17.9 H    (13.2-15.2)  %


 


Lymph % (Auto)  11.5 L    (13.4-35.0)  %


 


Mono % (Auto)  11.5 H    (0.0-7.3)  %


 


Mono #  1.3 H    (0.0-0.8)  K/mm3


 


Seg Neutrophils %  74.0 H    (40.0-70.0)  %


 


Seg Neutrophils #  8.5 H    (1.8-7.7)  K/mm3


 


Sodium     (137-145)  mmol/L


 


Potassium     (3.6-5.0)  mmol/L


 


Chloride     ()  mmol/L


 


BUN     (7-17)  mg/dL


 


Glucose     ()  mg/dL


 


POC Glucose    141 H  ()  


 


Calcium     (8.4-10.2)  mg/dL


 


Direct Bilirubin   0.3 H   (0-0.2)  mg/dL


 


AST   112 H   (5-40)  units/L


 


ALT   200 H   (7-56)  units/L


 


Total Protein   5.6 L   (6.3-8.2)  g/dL


 


Albumin   2.7 L   (3.9-5)  g/dL














- Imaging and cardiology


Chest x-ray: report reviewed, image reviewed (improving pulmonary edema)


CT scan - abdomen: report reviewed, image reviewed (small RLL infiltrate. Ileus 

+)

## 2019-07-11 NOTE — CONSULTATION
History of Present Illness


Consult date: 19


Chief complaint: 





dilated colon





- History of present illness


History of present illness: 





37 yo F POD 5 from csection who experienced post operative acute respiratory 

distress, hypoxia and was intubated. She is now extubated. Patient states she 

has pain at incision site from csection. She had a BM today and passed flatus. 

No n/v. Per nursing, patient has had clamping trials every 4-6 hours and has 

done well. She is tolerating ice chips. No f/c. Surgery is consulted due to 

dilated colon on CT scan A/P.





Past History


Past Medical History: other (herpes simplex 2, gestational diabetes, depression,

general anxiety).  denies: CAD, COPD, hypertension


Past Surgical History:  (x1 )


Social history: lives with family


Family history: no significant family history





Medications and Allergies


                                    Allergies











Allergy/AdvReac Type Severity Reaction Status Date / Time


 


No Known Allergies Allergy   Verified 19 19:31











                                Home Medications











 Medication  Instructions  Recorded  Confirmed  Last Taken  Type


 


No Known Home Medications [No  19 Unknown History





Reported Home Medications]     











Active Meds: 


Active Medications





Albuterol (Proventil)  2.5 mg IH Q4HRT PRN


   PRN Reason: Shortness Of Breath


Bisacodyl (Dulcolax)  10 mg DE QDAY PRN


   PRN Reason: constipation unrelieved by MOM


Hydralazine HCl (Apresoline)  10 mg IV Q4HR PRN


   PRN Reason: SBP > 160


   Last Admin: 19 23:16 Dose:  10 mg


   Documented by: 


Hydrophilic Ointment (Vaseline Lip Therapy)  1 applic TP Q2HR PRN


   PRN Reason: Dry Lips


Dextrose (D5w)  1,000 mls @ 75 mls/hr IV AS DIRECT AYAKA


   Last Admin: 19 03:26 Dose:  75 mls/hr


   Documented by: 


Lansoprazole (Prevacid Solutab)  30 mg FEEDTUBE BID AYAKA


   Stop: 19 22:01


   Last Admin: 19 11:03 Dose:  30 mg


   Documented by: 


Magnesium Hydroxide (Milk Of Magnesia)  30 ml PO Q4H PRN


   PRN Reason: Constipation


Metoclopramide HCl (Reglan)  10 mg IV Q6H PRN


   PRN Reason: Nausea And Vomiting


   Last Admin: 19 08:58 Dose:  10 mg


   Documented by: 


Morphine Sulfate (Morphine)  2 mg IV Q4H PRN


   PRN Reason: Pain, Moderate (4-6)


   Last Admin: 19 17:24 Dose:  2 mg


   Documented by: 


Multi-Ingred Cream/Lotion/Oil/Oint (Artificial Tears Ophth Oint)  1 applic OU 

Q4HR PRN


   PRN Reason: Dry Eye(s)


Multi-Ingredient Ointment (Lansinoh)  1 applic TP PRN PRN


   PRN Reason: dryness/cracking


Naloxone HCl (Narcan 0.4 Mg/1 Ml)  0.1 mg IV Q2MIN PRN


   PRN Reason: Res Rate </= 8 or 02 SAT < 92%


Pantoprazole Sodium (Protonix)  40 mg PO BID [facility]


Potassium Chloride (Potassium Chloride)  40 meq FEEDTUBE Q4H [facility]


   Stop: 19 14:01


   Last Admin: 19 11:03 Dose:  40 meq


   Documented by: 


Sodium Chloride (Sodium Chloride Flush Syringe 10 Ml)  10 ml IV BID [facility]


   Last Admin: 07/10/19 13:22 Dose:  Not Given


   Documented by: 


Sodium Chloride (Sodium Chloride Flush Syringe 10 Ml)  10 ml IV PRN PRN


   PRN Reason: LINE FLUSH


Witch Hazel/Glycerin (Tucks Pad)  1 each TP PRN PRN


   PRN Reason: Hemorrhoids/cleansing/soothing











Review of Systems


All systems: negative (10 pt ROS performed and negative except for that listed 

in HPI)





Exam


                                   Vital Signs











Pulse BP


 


 85   177/85 


 


 19 18:02  19 18:02











Narrative exam: 





Gen: AAOx3. NAD


ENT; NGT clamped. 


CV; s1, s2+


Resp; even and unlabored


Abd: soft, distended, NT


Ext; no c/c/e





NGT hooked to suction and flushed with air. Scant output. Canister with 475cc of

bilious drainage for last 24 hours.





Results





- Labs





                                 19 04:09





                                 19 04:09


                              Abnormal lab results











  07/10/19 07/11/19 07/11/19 Range/Units





  17:10 04:09 04:09 


 


WBC    11.5 H  (4.5-11.0)  K/mm3


 


RBC    3.64 L  (3.65-5.03)  M/mm3


 


RDW    17.9 H  (13.2-15.2)  %


 


Lymph % (Auto)    11.5 L  (13.4-35.0)  %


 


Mono % (Auto)    11.5 H  (0.0-7.3)  %


 


Mono #    1.3 H  (0.0-0.8)  K/mm3


 


Seg Neutrophils %    74.0 H  (40.0-70.0)  %


 


Seg Neutrophils #    8.5 H  (1.8-7.7)  K/mm3


 


Sodium   152 H   (137-145)  mmol/L


 


Potassium   3.2 L   (3.6-5.0)  mmol/L


 


Chloride   116.9 H   ()  mmol/L


 


BUN   24 H   (7-17)  mg/dL


 


Glucose   146 H   ()  mg/dL


 


POC Glucose  165 H    ()  


 


Calcium   8.2 L   (8.4-10.2)  mg/dL


 


Direct Bilirubin     (0-0.2)  mg/dL


 


AST     (5-40)  units/L


 


ALT     (7-56)  units/L


 


Total Protein     (6.3-8.2)  g/dL


 


Albumin     (3.9-5)  g/dL














  19 Range/Units





  04:09 05:32 


 


WBC    (4.5-11.0)  K/mm3


 


RBC    (3.65-5.03)  M/mm3


 


RDW    (13.2-15.2)  %


 


Lymph % (Auto)    (13.4-35.0)  %


 


Mono % (Auto)    (0.0-7.3)  %


 


Mono #    (0.0-0.8)  K/mm3


 


Seg Neutrophils %    (40.0-70.0)  %


 


Seg Neutrophils #    (1.8-7.7)  K/mm3


 


Sodium    (137-145)  mmol/L


 


Potassium    (3.6-5.0)  mmol/L


 


Chloride    ()  mmol/L


 


BUN    (7-17)  mg/dL


 


Glucose    ()  mg/dL


 


POC Glucose   141 H  ()  


 


Calcium    (8.4-10.2)  mg/dL


 


Direct Bilirubin  0.3 H   (0-0.2)  mg/dL


 


AST  112 H   (5-40)  units/L


 


ALT  200 H   (7-56)  units/L


 


Total Protein  5.6 L   (6.3-8.2)  g/dL


 


Albumin  2.7 L   (3.9-5)  g/dL








                                 Diabetes panel











  19 Range/Units





  04:09 04:09 


 


Sodium  152 H   (137-145)  mmol/L


 


Potassium  3.2 L   (3.6-5.0)  mmol/L


 


Chloride  116.9 H   ()  mmol/L


 


Carbon Dioxide  23   (22-30)  mmol/L


 


BUN  24 H   (7-17)  mg/dL


 


Creatinine  1.2   (0.7-1.2)  mg/dL


 


Glucose  146 H   ()  mg/dL


 


Calcium  8.2 L   (8.4-10.2)  mg/dL


 


AST   112 H  (5-40)  units/L


 


ALT   200 H  (7-56)  units/L


 


Alkaline Phosphatase   115  ()  units/L


 


Total Protein   5.6 L  (6.3-8.2)  g/dL


 


Albumin   2.7 L  (3.9-5)  g/dL








                                  Calcium panel











  19 Range/Units





  04:09 04:09 


 


Calcium  8.2 L   (8.4-10.2)  mg/dL


 


Albumin   2.7 L  (3.9-5)  g/dL








                                 Pituitary panel











  19 Range/Units





  04:09 


 


Sodium  152 H  (137-145)  mmol/L


 


Potassium  3.2 L  (3.6-5.0)  mmol/L


 


Chloride  116.9 H  ()  mmol/L


 


Carbon Dioxide  23  (22-30)  mmol/L


 


BUN  24 H  (7-17)  mg/dL


 


Creatinine  1.2  (0.7-1.2)  mg/dL


 


Glucose  146 H  ()  mg/dL


 


Calcium  8.2 L  (8.4-10.2)  mg/dL








                                  Adrenal panel











  19 Range/Units





  04:09 04:09 


 


Sodium  152 H   (137-145)  mmol/L


 


Potassium  3.2 L   (3.6-5.0)  mmol/L


 


Chloride  116.9 H   ()  mmol/L


 


Carbon Dioxide  23   (22-30)  mmol/L


 


BUN  24 H   (7-17)  mg/dL


 


Creatinine  1.2   (0.7-1.2)  mg/dL


 


Glucose  146 H   ()  mg/dL


 


Calcium  8.2 L   (8.4-10.2)  mg/dL


 


Total Bilirubin   0.70  (0.1-1.2)  mg/dL


 


AST   112 H  (5-40)  units/L


 


ALT   200 H  (7-56)  units/L


 


Alkaline Phosphatase   115  ()  units/L


 


Total Protein   5.6 L  (6.3-8.2)  g/dL


 


Albumin   2.7 L  (3.9-5)  g/dL














- Imaging


Abdominal x-ray: report reviewed, image reviewed


CT scan - abdomen: report reviewed, image reviewed


CT scan - pelvis: report reviewed, image reviewed





Assessment and Plan





37 yo F with colonic distension, post op csection





Plan:





1. Colonic distension likely post operative and related to electrolyte 

abnormalities. Ct A/P images reviewed as well as report and no obstruction seen.

Patient having BMs and passing flatus


2. ok to start clear liquids


3. keep NGT clamped - if patient tolerates clear liquids, may be removed in 4 

hours. If she does not, should be hooked back to LIWS. Nursing communication 

order written.


4. prn pain control, limit narcotics


5. OOB/ambulate in room and hallway as tolerated


6. incentive spirometry


7. replace electrolytes and monitor. Oral KCL replacement given today and Mg, 

Phos within normal limits.





Will follow. 





D/W Dr. Amaya. Thank you, please call with questions.

## 2019-07-12 LAB
ALBUMIN SERPL-MCNC: 2.8 G/DL (ref 3.9–5)
ALT SERPL-CCNC: 165 UNITS/L (ref 7–56)
BILIRUB DIRECT SERPL-MCNC: < 0.2 MG/DL (ref 0–0.2)
BUN SERPL-MCNC: 18 MG/DL (ref 7–17)
BUN/CREAT SERPL: 18 %
CALCIUM SERPL-MCNC: 8.3 MG/DL (ref 8.4–10.2)
HEMOLYSIS INDEX: 5

## 2019-07-12 RX ADMIN — PANTOPRAZOLE SODIUM SCH MG: 40 TABLET, DELAYED RELEASE ORAL at 22:06

## 2019-07-12 RX ADMIN — POTASSIUM CHLORIDE SCH: 1.5 POWDER, FOR SOLUTION ORAL at 00:18

## 2019-07-12 RX ADMIN — PANTOPRAZOLE SODIUM SCH MG: 40 TABLET, DELAYED RELEASE ORAL at 12:11

## 2019-07-12 NOTE — PROGRESS NOTE
Assessment and Plan





Imp:


1. S/p  for intrauterine pregnancy at 38 weeks


2. Pulmonary edema -> probably combined cardiogenic and non-cardiogenic (rapid 

yet incomplete improvement), the former perhaps related to periods of elevated 

BP/IVFs and the latter of ? etiology (DDx includes aspiration, sepsis, AFE)


3. Acute respiratory failure, hypoxia


4. NAZARIO


5. Thrombocytopenia of ? etiology


6. Ileus


7. Hypernatremia


8. Hypokalemia


9. Transaminitis


10. Anion-gap metabolic acidosis, presumed related to #4 at this point


11. ? Acute pancreatitis





Rec:


1. Cultures are negative; CT a/p shows RLL infiltrate, possibly aspiration; 

completed a course of Cefipime per ID, and respiratory status is improving, so 

agree w/ holding further ABX for now


2. On RA; monitor respiratory status


3. SCDs while in bed; ambulatory now


4. Would optimize blood pressure given grade III diastolic dysfunction noted in 

Echo report


5. K repleted; can correct sodium with PO water intake


6. Lipase still elevated; LFTs improving; f/u general surgery recommendations


7. Drastically improved pulmonary-wise and she can go home from our standpoint, 

but we will cont. to monitor w/ you





Plan of care reviewed w/ family in detail, they understand/agree; all questions 

have been answered





Subjective


Date of service: 19


Principal diagnosis: 1) POD #4 S/P 1LTCS  2)gestatinal hypertension 3) acute 

resp failur


Interval history: 





On RA. No SOB, chest pain, wheezing, N/V. NG out, tolerated clear liquids. OOB 

to chair.


Active Medications





Albuterol (Proventil)  2.5 mg IH Q4HRT PRN


   PRN Reason: Shortness Of Breath


Dextrose (D5w)  1,000 mls @ 60 mls/hr IV AS DIRECT AYAKA


Magnesium Hydroxide (Milk Of Magnesia)  30 ml PO Q4H PRN


   PRN Reason: Constipation


Metoclopramide HCl (Reglan)  10 mg PO Q6H PRN


   PRN Reason: Nausea And Vomiting


Morphine Sulfate (Morphine)  2 mg IV Q4H PRN


   PRN Reason: Pain, Moderate (4-6)


   Last Admin: 19 00:44 Dose:  2 mg


   Documented by: 


Multi-Ingred Cream/Lotion/Oil/Oint (Artificial Tears Ophth Oint)  1 applic OU 

Q4HR PRN


   PRN Reason: Dry Eye(s)


Multi-Ingredient Ointment (Lansinoh)  1 applic TP PRN PRN


   PRN Reason: dryness/cracking


Pantoprazole Sodium (Protonix)  40 mg PO BID AYAKA


Witch Hazel/Glycerin (Tucks Pad)  1 each TP PRN PRN


   PRN Reason: Hemorrhoids/cleansing/soothing











Objective


                               Vital Signs - 12hr











  19





  23:50 00:00 06:13


 


Temperature 97.7 F  98.0 F


 


Pulse Rate 86 84 75


 


Respiratory 18  16





Rate   


 


Blood Pressure   148/81


 


Blood Pressure 149/87  





[Right]   


 


O2 Sat by Pulse   96





Oximetry   














  19





  08:36


 


Temperature 97.6 F


 


Pulse Rate 97 H


 


Respiratory 18





Rate 


 


Blood Pressure 117/79


 


Blood Pressure 





[Right] 


 


O2 Sat by Pulse 98





Oximetry 











Constitutional: no acute distress, alert


Eyes: non-icteric


ENT: oropharynx moist


Neck: supple


Effort: normal


Ascultation: Bilateral: clear


Cardiovascular: regular rate and rhythm (no mrg)


Gastrointestinal: normoactive bowel sounds, soft, other (distended, mildly TTP; 

no guarding/rebound)


Integumentary: normal


Extremities: no cyanosis, no edema, pink and warm


Neurologic: normal mental status, non-focal exam, pupils equal and round, CN II-

XII normal


Psychiatric: mood appropriate, affect normal


CBC and BMP: 


                                 19 04:09





                                 19 05:08


ABG, PT/INR, D-dimer: 


ABG











POC ABG pH  7.400  (7.35-7.45)   19  12:57    


 


POC ABG pCO2  30.6  (35-45)  L  19  12:57    


 


POC ABG pO2  125  ()  H  19  12:57    


 


POC ABG HCO3  19.0  (22-26 mml/L)   19  12:57    


 


POC ABG Total CO2  20  (23-27mmol/L)   19  12:57    


 


POC ABG O2 Sat  99   19  12:57    





PT/INR, D-dimer











PT  13.2 Sec. (12.2-14.9)   19  14:24    


 


INR  1.03  (0.87-1.13)   19  14:24    


 


  > 73816 ng/mlDDU (0-234)  H  19  14:24    








Abnormal lab findings: 


                                  Abnormal Labs











  19





  19:45 16:12 16:12


 


WBC  12.3 H  16.3 H 


 


RBC   


 


Hgb   


 


Hct   


 


MCHC   


 


RDW  16.9 H  16.6 H 


 


Plt Count   


 


Lymph % (Auto)   


 


Mono % (Auto)   


 


Lymph #   


 


Mono #   


 


Seg Neutrophils %   


 


Seg Neuts % (Manual)   


 


Lymphocytes % (Manual)   


 


Seg Neutrophils #   


 


Seg Neutrophils # Man   


 


APTT   


 


Fibrinogen   


 


D-Dimer   


 


POC ABG pH   


 


POC ABG pCO2   


 


POC ABG pO2   


 


Sodium   


 


Potassium   


 


Chloride   


 


Carbon Dioxide   


 


BUN   


 


Creatinine    0.5 L


 


Glucose   


 


POC Glucose   


 


Calcium   


 


Phosphorus   


 


Magnesium   


 


Direct Bilirubin   


 


AST   


 


ALT   


 


Lactate Dehydrogenase    248 H


 


Total Creatine Kinase   


 


CK-MB (CK-2)   


 


CK-MB (CK-2) Rel Index   


 


Troponin T   


 


C-Reactive Protein   


 


NT-Pro-B Natriuret Pep   


 


Total Protein   


 


Albumin   


 


Triglycerides   


 


Cholesterol   


 


HDL Cholesterol   


 


Lipase   


 


Urine WBC (Auto)   


 


Urine Creatinine   


 


Miscellaneous Test   














  19





  23:41 23:41 23:41


 


WBC  18.3 H  


 


RBC   


 


Hgb   


 


Hct   


 


MCHC   


 


RDW  16.6 H  


 


Plt Count   


 


Lymph % (Auto)  3.5 L  


 


Mono % (Auto)  7.7 H  


 


Lymph #  0.6 L  


 


Mono #  1.4 H  


 


Seg Neutrophils %  88.7 H  


 


Seg Neuts % (Manual)   


 


Lymphocytes % (Manual)   


 


Seg Neutrophils #  16.2 H  


 


Seg Neutrophils # Man   


 


APTT   


 


Fibrinogen   


 


D-Dimer   


 


POC ABG pH   


 


POC ABG pCO2   


 


POC ABG pO2   


 


Sodium   


 


Potassium   


 


Chloride   


 


Carbon Dioxide   


 


BUN   


 


Creatinine   


 


Glucose   


 


POC Glucose   


 


Calcium   


 


Phosphorus   


 


Magnesium   


 


Direct Bilirubin   


 


AST   


 


ALT   


 


Lactate Dehydrogenase   


 


Total Creatine Kinase   


 


CK-MB (CK-2)   


 


CK-MB (CK-2) Rel Index   


 


Troponin T    0.274 H*


 


C-Reactive Protein   


 


NT-Pro-B Natriuret Pep   5198 H 


 


Total Protein   


 


Albumin   


 


Triglycerides    540 H


 


Cholesterol    291 H


 


HDL Cholesterol    60 H


 


Lipase   


 


Urine WBC (Auto)   


 


Urine Creatinine   


 


Miscellaneous Test   














  19





  23:41 04:40 04:40


 


WBC   


 


RBC   


 


Hgb   


 


Hct   


 


MCHC   


 


RDW   


 


Plt Count   


 


Lymph % (Auto)   


 


Mono % (Auto)   


 


Lymph #   


 


Mono #   


 


Seg Neutrophils %   


 


Seg Neuts % (Manual)   


 


Lymphocytes % (Manual)   


 


Seg Neutrophils #   


 


Seg Neutrophils # Man   


 


APTT   


 


Fibrinogen   


 


D-Dimer   


 


POC ABG pH   


 


POC ABG pCO2   


 


POC ABG pO2   


 


Sodium   


 


Potassium   


 


Chloride    108.3 H


 


Carbon Dioxide    21 L


 


BUN   


 


Creatinine   


 


Glucose    195 H


 


POC Glucose   


 


Calcium    7.7 L


 


Phosphorus   


 


Magnesium   


 


Direct Bilirubin   


 


AST   


 


ALT   


 


Lactate Dehydrogenase   


 


Total Creatine Kinase  155 H  


 


CK-MB (CK-2)  11.4 H  


 


CK-MB (CK-2) Rel Index  7.3 H  


 


Troponin T   0.177 H* D 


 


C-Reactive Protein   


 


NT-Pro-B Natriuret Pep   


 


Total Protein   


 


Albumin   


 


Triglycerides   


 


Cholesterol   


 


HDL Cholesterol   


 


Lipase   


 


Urine WBC (Auto)   


 


Urine Creatinine   


 


Miscellaneous Test   














  19





  08:35 09:47 22:35


 


WBC    20.8 H


 


RBC   


 


Hgb   


 


Hct   


 


MCHC   


 


RDW    17.2 H


 


Plt Count   


 


Lymph % (Auto)   


 


Mono % (Auto)   


 


Lymph #   


 


Mono #   


 


Seg Neutrophils %   


 


Seg Neuts % (Manual)    79.0 H


 


Lymphocytes % (Manual)    12.0 L


 


Seg Neutrophils #   


 


Seg Neutrophils # Man    16.4 H


 


APTT   


 


Fibrinogen   


 


D-Dimer   


 


POC ABG pH   


 


POC ABG pCO2   


 


POC ABG pO2   


 


Sodium   


 


Potassium   


 


Chloride   


 


Carbon Dioxide   


 


BUN   


 


Creatinine   


 


Glucose   


 


POC Glucose   


 


Calcium   


 


Phosphorus   


 


Magnesium   


 


Direct Bilirubin   


 


AST   


 


ALT   


 


Lactate Dehydrogenase   


 


Total Creatine Kinase   


 


CK-MB (CK-2)   


 


CK-MB (CK-2) Rel Index   


 


Troponin T   0.157 H* 


 


C-Reactive Protein   


 


NT-Pro-B Natriuret Pep   


 


Total Protein   


 


Albumin   


 


Triglycerides   


 


Cholesterol   


 


HDL Cholesterol   


 


Lipase   


 


Urine WBC (Auto)  37.0 H  


 


Urine Creatinine   


 


Miscellaneous Test   














  19





  22:35 22:35 22:44


 


WBC   


 


RBC   


 


Hgb   


 


Hct   


 


MCHC   


 


RDW   


 


Plt Count   


 


Lymph % (Auto)   


 


Mono % (Auto)   


 


Lymph #   


 


Mono #   


 


Seg Neutrophils %   


 


Seg Neuts % (Manual)   


 


Lymphocytes % (Manual)   


 


Seg Neutrophils #   


 


Seg Neutrophils # Man   


 


APTT  20.7 L  


 


Fibrinogen   


 


D-Dimer   


 


POC ABG pH    7.046 L


 


POC ABG pCO2    > 70 H


 


POC ABG pO2    62 L


 


Sodium   


 


Potassium   


 


Chloride   


 


Carbon Dioxide   


 


BUN   


 


Creatinine   


 


Glucose   


 


POC Glucose   


 


Calcium   


 


Phosphorus   


 


Magnesium   


 


Direct Bilirubin   


 


AST   


 


ALT   


 


Lactate Dehydrogenase   


 


Total Creatine Kinase   267 H 


 


CK-MB (CK-2)   9.7 H 


 


CK-MB (CK-2) Rel Index   


 


Troponin T   0.313 H* D 


 


C-Reactive Protein   


 


NT-Pro-B Natriuret Pep   


 


Total Protein   


 


Albumin   


 


Triglycerides   


 


Cholesterol   


 


HDL Cholesterol   


 


Lipase   


 


Urine WBC (Auto)   


 


Urine Creatinine   


 


Miscellaneous Test   














  19





  00:03 01:24 04:28


 


WBC   


 


RBC   


 


Hgb   


 


Hct   


 


MCHC   


 


RDW   


 


Plt Count   


 


Lymph % (Auto)   


 


Mono % (Auto)   


 


Lymph #   


 


Mono #   


 


Seg Neutrophils %   


 


Seg Neuts % (Manual)   


 


Lymphocytes % (Manual)   


 


Seg Neutrophils #   


 


Seg Neutrophils # Man   


 


APTT   


 


Fibrinogen   


 


D-Dimer   


 


POC ABG pH   7.246 L 


 


POC ABG pCO2   47.0 H 


 


POC ABG pO2   72 L 


 


Sodium   


 


Potassium   


 


Chloride   


 


Carbon Dioxide   


 


BUN   


 


Creatinine   


 


Glucose   


 


POC Glucose  152 H  


 


Calcium   


 


Phosphorus   


 


Magnesium   


 


Direct Bilirubin   


 


AST   


 


ALT   


 


Lactate Dehydrogenase   


 


Total Creatine Kinase   


 


CK-MB (CK-2)   


 


CK-MB (CK-2) Rel Index   


 


Troponin T    0.740 H* D


 


C-Reactive Protein   


 


NT-Pro-B Natriuret Pep   


 


Total Protein   


 


Albumin   


 


Triglycerides   


 


Cholesterol   


 


HDL Cholesterol   


 


Lipase   


 


Urine WBC (Auto)   


 


Urine Creatinine   


 


Miscellaneous Test   














  19





  05:33 05:53 10:19


 


WBC   


 


RBC   


 


Hgb   


 


Hct   


 


MCHC   


 


RDW   


 


Plt Count   


 


Lymph % (Auto)   


 


Mono % (Auto)   


 


Lymph #   


 


Mono #   


 


Seg Neutrophils %   


 


Seg Neuts % (Manual)   


 


Lymphocytes % (Manual)   


 


Seg Neutrophils #   


 


Seg Neutrophils # Man   


 


APTT   


 


Fibrinogen   


 


D-Dimer   


 


POC ABG pH   7.328 L 


 


POC ABG pCO2   


 


POC ABG pO2   256 H 


 


Sodium   


 


Potassium   


 


Chloride   


 


Carbon Dioxide   


 


BUN   


 


Creatinine   


 


Glucose   


 


POC Glucose  153 H  


 


Calcium   


 


Phosphorus   


 


Magnesium   


 


Direct Bilirubin   


 


AST   


 


ALT   


 


Lactate Dehydrogenase   


 


Total Creatine Kinase   


 


CK-MB (CK-2)   


 


CK-MB (CK-2) Rel Index   


 


Troponin T   


 


C-Reactive Protein   


 


NT-Pro-B Natriuret Pep   


 


Total Protein   


 


Albumin   


 


Triglycerides   


 


Cholesterol   


 


HDL Cholesterol   


 


Lipase   


 


Urine WBC (Auto)   


 


Urine Creatinine    34.9 H


 


Miscellaneous Test   














  19





  10:38 10:38 12:43


 


WBC   


 


RBC   


 


Hgb   


 


Hct   


 


MCHC   


 


RDW   


 


Plt Count   


 


Lymph % (Auto)   


 


Mono % (Auto)   


 


Lymph #   


 


Mono #   


 


Seg Neutrophils %   


 


Seg Neuts % (Manual)   


 


Lymphocytes % (Manual)   


 


Seg Neutrophils #   


 


Seg Neutrophils # Man   


 


APTT   


 


Fibrinogen   


 


D-Dimer   


 


POC ABG pH   


 


POC ABG pCO2   


 


POC ABG pO2   


 


Sodium    134 L


 


Potassium   


 


Chloride   


 


Carbon Dioxide    18 L


 


BUN    21 H


 


Creatinine    3.0 H


 


Glucose    166 H


 


POC Glucose   


 


Calcium    7.6 L


 


Phosphorus   


 


Magnesium   


 


Direct Bilirubin   


 


AST   


 


ALT   


 


Lactate Dehydrogenase   


 


Total Creatine Kinase   431 H 


 


CK-MB (CK-2)   


 


CK-MB (CK-2) Rel Index   


 


Troponin T  0.473 H* D  


 


C-Reactive Protein   


 


NT-Pro-B Natriuret Pep    73549 H


 


Total Protein   


 


Albumin   


 


Triglycerides   


 


Cholesterol   


 


HDL Cholesterol   


 


Lipase   


 


Urine WBC (Auto)   


 


Urine Creatinine   


 


Miscellaneous Test   














  19





  12:43 13:02 14:11


 


WBC  16.1 H  


 


RBC  3.47 L  


 


Hgb   


 


Hct   


 


MCHC  35 H  


 


RDW  17.1 H  


 


Plt Count  129 L  


 


Lymph % (Auto)   


 


Mono % (Auto)   


 


Lymph #   


 


Mono #   


 


Seg Neutrophils %   


 


Seg Neuts % (Manual)   


 


Lymphocytes % (Manual)   


 


Seg Neutrophils #   


 


Seg Neutrophils # Man   


 


APTT   


 


Fibrinogen   


 


D-Dimer   


 


POC ABG pH   


 


POC ABG pCO2   


 


POC ABG pO2   


 


Sodium   


 


Potassium   


 


Chloride   


 


Carbon Dioxide   


 


BUN   


 


Creatinine   


 


Glucose   


 


POC Glucose   163 H 


 


Calcium   


 


Phosphorus   


 


Magnesium   


 


Direct Bilirubin   


 


AST   


 


ALT   


 


Lactate Dehydrogenase   


 


Total Creatine Kinase   


 


CK-MB (CK-2)   


 


CK-MB (CK-2) Rel Index   


 


Troponin T   


 


C-Reactive Protein    14.60 H


 


NT-Pro-B Natriuret Pep   


 


Total Protein   


 


Albumin   


 


Triglycerides   


 


Cholesterol   


 


HDL Cholesterol   


 


Lipase   


 


Urine WBC (Auto)   


 


Urine Creatinine   


 


Miscellaneous Test   














  19





  17:39 23:02 Unknown


 


WBC   


 


RBC   


 


Hgb   


 


Hct   


 


MCHC   


 


RDW   


 


Plt Count   


 


Lymph % (Auto)   


 


Mono % (Auto)   


 


Lymph #   


 


Mono #   


 


Seg Neutrophils %   


 


Seg Neuts % (Manual)   


 


Lymphocytes % (Manual)   


 


Seg Neutrophils #   


 


Seg Neutrophils # Man   


 


APTT   


 


Fibrinogen   


 


D-Dimer   


 


POC ABG pH   


 


POC ABG pCO2   


 


POC ABG pO2   


 


Sodium    134 L D


 


Potassium   


 


Chloride    97.9 L


 


Carbon Dioxide    18 L


 


BUN   


 


Creatinine    2.3 H D


 


Glucose    225 H


 


POC Glucose  162 H  155 H 


 


Calcium    7.4 L


 


Phosphorus   


 


Magnesium   


 


Direct Bilirubin   


 


AST   


 


ALT   


 


Lactate Dehydrogenase   


 


Total Creatine Kinase   


 


CK-MB (CK-2)   


 


CK-MB (CK-2) Rel Index   


 


Troponin T   


 


C-Reactive Protein   


 


NT-Pro-B Natriuret Pep   


 


Total Protein   


 


Albumin   


 


Triglycerides   


 


Cholesterol   


 


HDL Cholesterol   


 


Lipase   


 


Urine WBC (Auto)   


 


Urine Creatinine   


 


Miscellaneous Test   














  19





  03:44 03:44 05:07


 


WBC  14.8 H  


 


RBC  3.12 L  


 


Hgb  9.8 L  


 


Hct  28.5 L  


 


MCHC   


 


RDW  17.5 H  


 


Plt Count  127 L  


 


Lymph % (Auto)   


 


Mono % (Auto)   


 


Lymph #   


 


Mono #   


 


Seg Neutrophils %   


 


Seg Neuts % (Manual)   


 


Lymphocytes % (Manual)   


 


Seg Neutrophils #   


 


Seg Neutrophils # Man   


 


APTT   


 


Fibrinogen   


 


D-Dimer   


 


POC ABG pH   


 


POC ABG pCO2   


 


POC ABG pO2    114 H


 


Sodium   


 


Potassium   3.0 L 


 


Chloride   


 


Carbon Dioxide   18 L 


 


BUN   28 H 


 


Creatinine   3.1 H 


 


Glucose   139 H 


 


POC Glucose   


 


Calcium   7.7 L 


 


Phosphorus   


 


Magnesium   


 


Direct Bilirubin   


 


AST   


 


ALT   


 


Lactate Dehydrogenase   


 


Total Creatine Kinase   


 


CK-MB (CK-2)   


 


CK-MB (CK-2) Rel Index   


 


Troponin T   


 


C-Reactive Protein   


 


NT-Pro-B Natriuret Pep   


 


Total Protein   5.3 L 


 


Albumin   2.5 L 


 


Triglycerides   


 


Cholesterol   


 


HDL Cholesterol   


 


Lipase   


 


Urine WBC (Auto)   


 


Urine Creatinine   


 


Miscellaneous Test   














  19





  05:36 05:37 12:45


 


WBC   


 


RBC   


 


Hgb   


 


Hct   


 


MCHC   


 


RDW   


 


Plt Count   


 


Lymph % (Auto)   


 


Mono % (Auto)   


 


Lymph #   


 


Mono #   


 


Seg Neutrophils %   


 


Seg Neuts % (Manual)   


 


Lymphocytes % (Manual)   


 


Seg Neutrophils #   


 


Seg Neutrophils # Man   


 


APTT   


 


Fibrinogen   


 


D-Dimer   


 


POC ABG pH   


 


POC ABG pCO2   


 


POC ABG pO2   


 


Sodium   


 


Potassium   


 


Chloride   


 


Carbon Dioxide   


 


BUN   


 


Creatinine   


 


Glucose   


 


POC Glucose   151 H  170 H


 


Calcium   


 


Phosphorus   


 


Magnesium   


 


Direct Bilirubin   


 


AST   


 


ALT   


 


Lactate Dehydrogenase   


 


Total Creatine Kinase   


 


CK-MB (CK-2)   


 


CK-MB (CK-2) Rel Index   


 


Troponin T   


 


C-Reactive Protein   


 


NT-Pro-B Natriuret Pep   


 


Total Protein   


 


Albumin   


 


Triglycerides   


 


Cholesterol   


 


HDL Cholesterol   


 


Lipase   


 


Urine WBC (Auto)   


 


Urine Creatinine   


 


Miscellaneous Test  Flexitest 1 H  














  19





  14:24 18:05 23:41


 


WBC   


 


RBC   


 


Hgb   


 


Hct   


 


MCHC   


 


RDW   


 


Plt Count   


 


Lymph % (Auto)   


 


Mono % (Auto)   


 


Lymph #   


 


Mono #   


 


Seg Neutrophils %   


 


Seg Neuts % (Manual)   


 


Lymphocytes % (Manual)   


 


Seg Neutrophils #   


 


Seg Neutrophils # Man   


 


APTT  21.7 L  


 


Fibrinogen  586 H  


 


D-Dimer  > 11447 H  


 


POC ABG pH   


 


POC ABG pCO2   


 


POC ABG pO2   


 


Sodium   


 


Potassium   


 


Chloride   


 


Carbon Dioxide   


 


BUN   


 


Creatinine   


 


Glucose   


 


POC Glucose   168 H  149 H


 


Calcium   


 


Phosphorus   


 


Magnesium   


 


Direct Bilirubin   


 


AST   


 


ALT   


 


Lactate Dehydrogenase   


 


Total Creatine Kinase   


 


CK-MB (CK-2)   


 


CK-MB (CK-2) Rel Index   


 


Troponin T   


 


C-Reactive Protein   


 


NT-Pro-B Natriuret Pep   


 


Total Protein   


 


Albumin   


 


Triglycerides   


 


Cholesterol   


 


HDL Cholesterol   


 


Lipase   


 


Urine WBC (Auto)   


 


Urine Creatinine   


 


Miscellaneous Test   














  19





  04:13 04:29 04:29


 


WBC   17.7 H 


 


RBC   


 


Hgb   


 


Hct   


 


MCHC   


 


RDW   17.3 H 


 


Plt Count   


 


Lymph % (Auto)   5.9 L 


 


Mono % (Auto)   8.2 H 


 


Lymph #   1.0 L 


 


Mono #   1.4 H 


 


Seg Neutrophils %   85.6 H 


 


Seg Neuts % (Manual)   


 


Lymphocytes % (Manual)   


 


Seg Neutrophils #   15.2 H 


 


Seg Neutrophils # Man   


 


APTT   


 


Fibrinogen   


 


D-Dimer   


 


POC ABG pH   


 


POC ABG pCO2  < 30 L  


 


POC ABG pO2  122 H  


 


Sodium    146 H D


 


Potassium    2.8 L*


 


Chloride    108.9 H


 


Carbon Dioxide    17 L


 


BUN    33 H


 


Creatinine    2.3 H


 


Glucose    146 H


 


POC Glucose   


 


Calcium   


 


Phosphorus   


 


Magnesium   


 


Direct Bilirubin   


 


AST    82 H


 


ALT    83 H


 


Lactate Dehydrogenase   


 


Total Creatine Kinase   


 


CK-MB (CK-2)   


 


CK-MB (CK-2) Rel Index   


 


Troponin T   


 


C-Reactive Protein   


 


NT-Pro-B Natriuret Pep   


 


Total Protein    6.2 L


 


Albumin    2.6 L


 


Triglycerides   


 


Cholesterol   


 


HDL Cholesterol   


 


Lipase   


 


Urine WBC (Auto)   


 


Urine Creatinine   


 


Miscellaneous Test   














  19





  05:26 12:33 12:57


 


WBC   


 


RBC   


 


Hgb   


 


Hct   


 


MCHC   


 


RDW   


 


Plt Count   


 


Lymph % (Auto)   


 


Mono % (Auto)   


 


Lymph #   


 


Mono #   


 


Seg Neutrophils %   


 


Seg Neuts % (Manual)   


 


Lymphocytes % (Manual)   


 


Seg Neutrophils #   


 


Seg Neutrophils # Man   


 


APTT   


 


Fibrinogen   


 


D-Dimer   


 


POC ABG pH   


 


POC ABG pCO2    30.6 L


 


POC ABG pO2    125 H


 


Sodium   


 


Potassium   


 


Chloride   


 


Carbon Dioxide   


 


BUN   


 


Creatinine   


 


Glucose   


 


POC Glucose  138 H  140 H 


 


Calcium   


 


Phosphorus   


 


Magnesium   


 


Direct Bilirubin   


 


AST   


 


ALT   


 


Lactate Dehydrogenase   


 


Total Creatine Kinase   


 


CK-MB (CK-2)   


 


CK-MB (CK-2) Rel Index   


 


Troponin T   


 


C-Reactive Protein   


 


NT-Pro-B Natriuret Pep   


 


Total Protein   


 


Albumin   


 


Triglycerides   


 


Cholesterol   


 


HDL Cholesterol   


 


Lipase   


 


Urine WBC (Auto)   


 


Urine Creatinine   


 


Miscellaneous Test   














  19





  18:22 19:02 19:02


 


WBC   


 


RBC   


 


Hgb   


 


Hct   


 


MCHC   


 


RDW   


 


Plt Count   


 


Lymph % (Auto)   


 


Mono % (Auto)   


 


Lymph #   


 


Mono #   


 


Seg Neutrophils %   


 


Seg Neuts % (Manual)   


 


Lymphocytes % (Manual)   


 


Seg Neutrophils #   


 


Seg Neutrophils # Man   


 


APTT   


 


Fibrinogen   


 


D-Dimer   


 


POC ABG pH   


 


POC ABG pCO2   


 


POC ABG pO2   


 


Sodium   


 


Potassium   3.5 L D  3.5 L


 


Chloride   111.4 H 


 


Carbon Dioxide   17 L 


 


BUN   32 H 


 


Creatinine   1.7 H 


 


Glucose   156 H 


 


POC Glucose  156 H  


 


Calcium   


 


Phosphorus   


 


Magnesium   


 


Direct Bilirubin   


 


AST   


 


ALT   


 


Lactate Dehydrogenase   


 


Total Creatine Kinase   


 


CK-MB (CK-2)   


 


CK-MB (CK-2) Rel Index   


 


Troponin T   


 


C-Reactive Protein   


 


NT-Pro-B Natriuret Pep   


 


Total Protein   


 


Albumin   


 


Triglycerides   


 


Cholesterol   


 


HDL Cholesterol   


 


Lipase   


 


Urine WBC (Auto)   


 


Urine Creatinine   


 


Miscellaneous Test   














  07/10/19 07/10/19 07/10/19





  03:55 03:55 03:55


 


WBC   


 


RBC   


 


Hgb   


 


Hct   


 


MCHC   


 


RDW   


 


Plt Count   


 


Lymph % (Auto)   


 


Mono % (Auto)   


 


Lymph #   


 


Mono #   


 


Seg Neutrophils %   


 


Seg Neuts % (Manual)   


 


Lymphocytes % (Manual)   


 


Seg Neutrophils #   


 


Seg Neutrophils # Man   


 


APTT   


 


Fibrinogen   


 


D-Dimer   


 


POC ABG pH   


 


POC ABG pCO2   


 


POC ABG pO2   


 


Sodium  150 H  


 


Potassium  3.1 L  


 


Chloride  115.2 H  


 


Carbon Dioxide  20 L  


 


BUN  31 H  


 


Creatinine  1.6 H  


 


Glucose  139 H  


 


POC Glucose   


 


Calcium   


 


Phosphorus    2.20 L


 


Magnesium    2.40 H


 


Direct Bilirubin   0.4 H 


 


AST   120 H 


 


ALT   175 H 


 


Lactate Dehydrogenase   


 


Total Creatine Kinase   


 


CK-MB (CK-2)   


 


CK-MB (CK-2) Rel Index   


 


Troponin T   


 


C-Reactive Protein   


 


NT-Pro-B Natriuret Pep   


 


Total Protein   5.6 L 


 


Albumin   2.8 L 


 


Triglycerides   


 


Cholesterol   


 


HDL Cholesterol   


 


Lipase   170 H 


 


Urine WBC (Auto)   


 


Urine Creatinine   


 


Miscellaneous Test   














  07/10/19 07/10/19 07/10/19





  06:04 11:34 17:10


 


WBC   


 


RBC   


 


Hgb   


 


Hct   


 


MCHC   


 


RDW   


 


Plt Count   


 


Lymph % (Auto)   


 


Mono % (Auto)   


 


Lymph #   


 


Mono #   


 


Seg Neutrophils %   


 


Seg Neuts % (Manual)   


 


Lymphocytes % (Manual)   


 


Seg Neutrophils #   


 


Seg Neutrophils # Man   


 


APTT   


 


Fibrinogen   


 


D-Dimer   


 


POC ABG pH   


 


POC ABG pCO2   


 


POC ABG pO2   


 


Sodium   


 


Potassium   


 


Chloride   


 


Carbon Dioxide   


 


BUN   


 


Creatinine   


 


Glucose   


 


POC Glucose  125 H  139 H  165 H


 


Calcium   


 


Phosphorus   


 


Magnesium   


 


Direct Bilirubin   


 


AST   


 


ALT   


 


Lactate Dehydrogenase   


 


Total Creatine Kinase   


 


CK-MB (CK-2)   


 


CK-MB (CK-2) Rel Index   


 


Troponin T   


 


C-Reactive Protein   


 


NT-Pro-B Natriuret Pep   


 


Total Protein   


 


Albumin   


 


Triglycerides   


 


Cholesterol   


 


HDL Cholesterol   


 


Lipase   


 


Urine WBC (Auto)   


 


Urine Creatinine   


 


Miscellaneous Test   














  19





  04:09 04:09 04:09


 


WBC   11.5 H 


 


RBC   3.64 L 


 


Hgb   


 


Hct   


 


MCHC   


 


RDW   17.9 H 


 


Plt Count   


 


Lymph % (Auto)   11.5 L 


 


Mono % (Auto)   11.5 H 


 


Lymph #   


 


[place] #   1.3 H 


 


Seg Neutrophils %   74.0 H 


 


Seg Neuts % (Manual)   


 


Lymphocytes % (Manual)   


 


Seg Neutrophils #   8.5 H 


 


Seg Neutrophils # Man   


 


APTT   


 


Fibrinogen   


 


D-Dimer   


 


POC ABG pH   


 


POC ABG pCO2   


 


POC ABG pO2   


 


Sodium  152 H  


 


Potassium  3.2 L  


 


Chloride  116.9 H  


 


Carbon Dioxide   


 


BUN  24 H  


 


Creatinine   


 


Glucose  146 H  


 


POC Glucose   


 


Calcium  8.2 L  


 


Phosphorus   


 


Magnesium   


 


Direct Bilirubin    0.3 H


 


AST    112 H


 


ALT    200 H


 


Lactate Dehydrogenase   


 


Total Creatine Kinase   


 


CK-MB (CK-2)   


 


CK-MB (CK-2) Rel Index   


 


Troponin T   


 


C-Reactive Protein   


 


NT-Pro-B Natriuret Pep   


 


Total Protein    5.6 L


 


Albumin    2.7 L


 


Triglycerides   


 


Cholesterol   


 


HDL Cholesterol   


 


Lipase   


 


Urine WBC (Auto)   


 


Urine Creatinine   


 


Miscellaneous Test   














  19





  05:32 01:43 05:08


 


WBC   


 


RBC   


 


Hgb   


 


Hct   


 


MCHC   


 


RDW   


 


Plt Count   


 


Lymph % (Auto)   


 


Mono % (Auto)   


 


Lymph #   


 


Mono #   


 


Seg Neutrophils %   


 


Seg Neuts % (Manual)   


 


Lymphocytes % (Manual)   


 


Seg Neutrophils #   


 


Seg Neutrophils # Man   


 


APTT   


 


Fibrinogen   


 


D-Dimer   


 


POC ABG pH   


 


POC ABG pCO2   


 


POC ABG pO2   


 


Sodium    150 H


 


Potassium   


 


Chloride    116.3 H


 


Carbon Dioxide   


 


BUN    18 H


 


Creatinine   


 


Glucose    101 H


 


POC Glucose  141 H  123 H 


 


Calcium    8.3 L


 


Phosphorus   


 


Magnesium   


 


Direct Bilirubin   


 


AST    58 H


 


ALT    165 H


 


Lactate Dehydrogenase   


 


Total Creatine Kinase   


 


CK-MB (CK-2)   


 


CK-MB (CK-2) Rel Index   


 


Troponin T   


 


C-Reactive Protein   


 


NT-Pro-B Natriuret Pep   


 


Total Protein    5.5 L


 


Albumin    2.8 L


 


Triglycerides   


 


Cholesterol   


 


HDL Cholesterol   


 


Lipase   


 


Urine WBC (Auto)   


 


Urine Creatinine   


 


Miscellaneous Test   














  19





  05:08


 


WBC 


 


RBC 


 


Hgb 


 


Hct 


 


MCHC 


 


RDW 


 


Plt Count 


 


Lymph % (Auto) 


 


Mono % (Auto) 


 


Lymph # 


 


Mono # 


 


Seg Neutrophils % 


 


Seg Neuts % (Manual) 


 


Lymphocytes % (Manual) 


 


Seg Neutrophils # 


 


Seg Neutrophils # Man 


 


APTT 


 


Fibrinogen 


 


D-Dimer 


 


POC ABG pH 


 


POC ABG pCO2 


 


POC ABG pO2 


 


Sodium 


 


Potassium 


 


Chloride 


 


Carbon Dioxide 


 


BUN 


 


Creatinine 


 


Glucose 


 


POC Glucose 


 


Calcium 


 


Phosphorus 


 


Magnesium 


 


Direct Bilirubin 


 


AST 


 


ALT 


 


Lactate Dehydrogenase 


 


Total Creatine Kinase 


 


CK-MB (CK-2) 


 


CK-MB (CK-2) Rel Index 


 


Troponin T 


 


C-Reactive Protein 


 


NT-Pro-B Natriuret Pep 


 


Total Protein 


 


Albumin 


 


Triglycerides  288 H


 


Cholesterol 


 


HDL Cholesterol 


 


Lipase  399 H


 


Urine WBC (Auto) 


 


Urine Creatinine 


 


Miscellaneous Test 











Chest x-ray: report reviewed, image reviewed

## 2019-07-12 NOTE — PROGRESS NOTE
Assessment and Plan





- Patient Problems


(1) 38 weeks gestation of pregnancy


Current Visit: Yes   Status: Resolved   





(2) Prolonged latent phase of labor


Current Visit: Yes   Status: Resolved   





(3) Oligohydramnios antepartum


Current Visit: Yes   Status: Resolved   





(4) Positive GBS test


Current Visit: Yes   Status: Resolved   





(5) SROM (spontaneous rupture of membranes)


Current Visit: Yes   Status: Ruled-out   





(6) S/P primary low transverse 


Current Visit: Yes   Status: Acute   


Plan to address problem: 


-cont post op care, overall much improved


-cont to ADAT as per gen sx recommendations


-once tolearating a regular diet will be clear for d/c home from [facility]


-will await clearance from other specialities prior to d/c home.








(7) Gestational hypertension


Current Visit: Yes   Status: Resolved   


Qualifiers: 


   Trimester: third trimester   Qualified Code(s): O13.3 - Gestational 

[pregnancy-induced] hypertension without significant proteinuria, third 

trimester   





(8) Pulmonary edema


Current Visit: Yes   Status: Acute   





(9) Acute renal failure


Current Visit: Yes   Status: Acute   


Qualifiers: 


   Acute renal failure type: with acute tubular necrosis   Qualified Code(s): 

N17.0 - Acute kidney failure with tubular necrosis   





(10) Acute respiratory failure


Current Visit: Yes   Status: Acute   


Qualifiers: 


   Respiratory failure complication: hypoxia   Qualified Code(s): J96.01 - Acute

respiratory failure with hypoxia   





Subjective





- Subjective


Date of service: 19 (late entry pt seen at 0845)


Principal diagnosis: 1) POD #5 S/P 1LTCS  2)gestatinal hypertension 3) acute 

resp failur


Interval history: 


pt sitting up eating clear diet. She has no c/o at this time. She did inquire 

about a breast pump and I advised that I would check all medications she is on 

to see if she can breast feed and if not she can pump and dump until she is able

to use the breast milk. Pt and her mother expressed understanding. Neprologist 

at bedside at time of my visit and again her questions were addressed and 

answered.Stressed that while she is stable from an ob standpoint, she will need 

clearance from all the other specialties that are caring for her before she is 

dc home. Pt and mother expressed understanding.


Patient reports: appetite normal, voiding normally, pain well controlled, 

flatus, ambulating normally, no dizzy ambulation


: doing well





Objective





- Vital Signs


Latest vital signs: 


                                   Vital Signs











  Temp Pulse Resp BP BP Pulse Ox


 


 19 08:36  97.6 F  97 H  18  117/79   98


 


 19 06:13  98.0 F  75  16  148/81   96


 


 19 00:00   84    


 


 19 23:50  97.7 F  86  18   149/87 


 


 19 23:21   89     97


 


 19 20:40  98.6 F  108 H  18   126/89  98


 


 19 15:20  97.7 F  79  18   158/91  99


 


 19 14:00   94 H  18  130/88   96


 


 19 13:31   96 H  21  132/89   97


 


 19 13:00   97 H  21  132/89   99


 


 19 12:31   103 H  14  125/86   98


 


 19 12:00  98.3 F  96 H  16  125/86   99


 


 19 11:31    18  137/92   99








                                Intake and Output











 19





 22:59 06:59 14:59


 


Intake Total  240 


 


Balance  240 


 


Intake:   


 


  Oral  240 


 


Other:   


 


  Total, Intake Amount  240 


 


  Voiding Method  Toilet 


 


  Weight 77.111 kg  














- Exam


Abdomen: Present: normal appearance, soft, normal bowel sounds (decreased but 

present).  Absent: tenderness, guarding


Uterus: Present: normal, firm, fundal height below umbilicus.  Absent: 

bogginess, tenderness


Extremities: Present: normal, edema (bilaterally not calf tenderness very mild).

 Absent: tenderness


Incision: Present: normal, dry, intact





- Labs


Labs: 


                              Abnormal lab results











  19 Range/Units





  01:43 05:08 05:08 


 


Sodium   150 H   (137-145)  mmol/L


 


Chloride   116.3 H   ()  mmol/L


 


BUN   18 H   (7-17)  mg/dL


 


Glucose   101 H   ()  mg/dL


 


POC Glucose  123 H    ()  


 


Calcium   8.3 L   (8.4-10.2)  mg/dL


 


AST   58 H   (5-40)  units/L


 


ALT   165 H   (7-56)  units/L


 


Total Protein   5.5 L   (6.3-8.2)  g/dL


 


Albumin   2.8 L   (3.9-5)  g/dL


 


Triglycerides    288 H  (2-149)  mg/dL


 


Lipase    399 H  (13-60)  units/L

## 2019-07-12 NOTE — PROGRESS NOTE
Assessment and Plan


Impression:


* NAZARIO with ATN


* sepsis


* ARDS


* post partum


* hypoxemia


* Hypernatremia


* Hypokalemia





Plan:


* Renal function has improved significantly.  Serum creatinine down to 1.0 

  today.  


* Patient remains nonoliguric.  


* avoid nephrotoxins


* Replace potassium PRN 


* Serum sodium is still elevated . Currently off IV fluids .  Resume IV fluid  

  with D5W.  Urine sodium is 39 and urine osmolality is 492 .  Encourage oral 

  fluid intake.


* Hold diuretics for now 


* Discussed with patient's  mother at bedside











Subjective


Date of service: 07/12/19


Principal diagnosis: 1) POD #4 S/P 1LTCS  2)gestatinal hypertension 3) acute 

resp failur


Interval history: 


Patient is out of the ICU.  Currently on liquid diet.  Had several bowel 

movements.  Denies any nausea or vomiting.








Objective





- Vital Signs


Vital signs: 


                               Vital Signs - 12hr











  07/11/19 07/11/19 07/12/19





  23:21 23:50 00:00


 


Temperature  97.7 F 


 


Pulse Rate 89 86 84


 


Respiratory  18 





Rate   


 


Blood Pressure   


 


Blood Pressure  149/87 





[Right]   


 


O2 Sat by Pulse 97  





Oximetry   














  07/12/19 07/12/19





  06:13 08:36


 


Temperature 98.0 F 97.6 F


 


Pulse Rate 75 97 H


 


Respiratory 16 18





Rate  


 


Blood Pressure 148/81 117/79


 


Blood Pressure  





[Right]  


 


O2 Sat by Pulse 96 98





Oximetry  














- General Appearance


General appearance: well-developed, well-nourished, appears stated age


EENT: PERRL, mucous membranes moist


Neck: no JVD, no thyromegaly, no carotid bruit, supple


Respiratory: Present: Clear to Ascultation


Cardiology: regular, normal heart rate, S1S2, no murmurs


Gastrointestinal: normal, normoactive bowel sounds, other (hypogastric incision 

noted)


Integumentary: other (trace edema)





- Lab





                                 07/11/19 04:09





                                 07/12/19 05:08


                             Most recent lab results











Calcium  8.3 mg/dL (8.4-10.2)  L  07/12/19  05:08    


 


Phosphorus  3.30 mg/dL (2.5-4.5)  D 07/11/19  04:59    


 


Magnesium  2.20 mg/dL (1.7-2.3)   07/11/19  04:59    


 


  34.9 mg/dL (0.1-20.0)  H  07/07/19  10:19    


 


  39 mmol/L  07/10/19  16:40    














Medications & Allergies





- Medications


Allergies/Adverse Reactions: 


                                    Allergies





No Known Allergies Allergy (Verified 07/04/19 19:31)


   








Home Medications: 


                                Home Medications











 Medication  Instructions  Recorded  Confirmed  Last Taken  Type


 


No Known Home Medications [No  07/05/19 07/05/19 Unknown History





Reported Home Medications]     











Active Medications: 














Generic Name Dose Route Start Last Admin





  Trade Name Oc  PRN Reason Stop Dose Admin


 


Albuterol  2.5 mg  07/10/19 00:12 





  Proventil  IH  





  Q4HRT PRN  





  Shortness Of Breath  


 


Magnesium Hydroxide  30 ml  07/05/19 23:16 





  Milk Of Magnesia  PO  





  Q4H PRN  





  Constipation  


 


Metoclopramide HCl  10 mg  07/11/19 19:55 





  Reglan  PO  





  Q6H PRN  





  Nausea And Vomiting  


 


Morphine Sulfate  2 mg  07/12/19 00:16  07/12/19 00:44





  Morphine  IV   2 mg





  Q4H PRN   Administration





  Pain, Moderate (4-6)  


 


Multi-Ingred Cream/Lotion/Oil/Oint  1 applic  07/06/19 22:12 





  Artificial Tears Ophth Oint  OU  





  Q4HR PRN  





  Dry Eye(s)  


 


Multi-Ingredient Ointment  1 applic  07/05/19 23:16 





  Lansinoh  TP  





  PRN PRN  





  dryness/cracking  


 


Pantoprazole Sodium  40 mg  07/12/19 10:00 





  Protonix  PO  





  BID AYAKA  


 


Witch Hazel/Glycerin  1 each  07/05/19 23:16 





  Tucks Pad  TP  





  PRN PRN  





  Hemorrhoids/cleansing/soothing

## 2019-07-12 NOTE — PROGRESS NOTE
Assessment and Plan





37 yo F with colonic distension, post op csection





Plan:





1. Colonic distension likely post operative and related to electrolyte 

abnormalities. Ct A/P images reviewed as well as report and no obstruction seen.

Patient having BMs and passing flatus


2. c/w clears today, adv to full liquids in am. Likely send patient home on 

fulls vs soft diet


3. prn pain control, switch to PO and limit narcotics


4. OOB - encouraged ambulate in room and hallway as tolerated


5. incentive spirometry


6. replace electrolytes and monitor Na





Will follow. Thank you, please call with questions.








Subjective


Date of service: 07/12/19


Narrative: 





Pt seen and examined. c/o occasional soreness at site of csection incision. No 

f/c. Tolerating clear liquids. NGT removed yesterday. Having Bms and passing 

flatus. No n/v. Has ambulated 





Objective


                               Vital Signs - 12hr











  07/12/19 07/12/19 07/12/19





  06:13 08:36 12:13


 


Temperature 98.0 F 97.6 F 97.8 F


 


Pulse Rate 75 97 H 100 H


 


Respiratory 16 18 18





Rate   


 


Blood Pressure 148/81 117/79 134/84


 


O2 Sat by Pulse 96 98 99





Oximetry   














- General physical appearance


Narrative Exam: 





Gen: AAOx3. NAD 


CV; s1, s2+


Resp; even and unlabored


Abd: soft, distended, NT. no r/r/g. tympanic


Ext; no c/c/e





- Labs





                                 07/11/19 04:09





                                 07/12/19 05:08


                                 Diabetes panel











  07/12/19 07/12/19 Range/Units





  05:08 05:08 


 


Sodium  150 H   (137-145)  mmol/L


 


Potassium  3.8   (3.6-5.0)  mmol/L


 


Chloride  116.3 H   ()  mmol/L


 


Carbon Dioxide  23   (22-30)  mmol/L


 


BUN  18 H   (7-17)  mg/dL


 


Creatinine  1.0   (0.7-1.2)  mg/dL


 


Glucose  101 H   ()  mg/dL


 


Calcium  8.3 L   (8.4-10.2)  mg/dL


 


AST  58 H   (5-40)  units/L


 


ALT  165 H   (7-56)  units/L


 


Alkaline Phosphatase  116   ()  units/L


 


Total Protein  5.5 L   (6.3-8.2)  g/dL


 


Albumin  2.8 L   (3.9-5)  g/dL


 


Triglycerides   288 H  (2-149)  mg/dL








                                  Calcium panel











  07/11/19 07/12/19 Range/Units





  04:59 05:08 


 


Calcium   8.3 L  (8.4-10.2)  mg/dL


 


Phosphorus  3.30  D   (2.5-4.5)  mg/dL


 


Albumin   2.8 L  (3.9-5)  g/dL








                                 Pituitary panel











  07/12/19 Range/Units





  05:08 


 


Sodium  150 H  (137-145)  mmol/L


 


Potassium  3.8  (3.6-5.0)  mmol/L


 


Chloride  116.3 H  ()  mmol/L


 


Carbon Dioxide  23  (22-30)  mmol/L


 


BUN  18 H  (7-17)  mg/dL


 


Creatinine  1.0  (0.7-1.2)  mg/dL


 


Glucose  101 H  ()  mg/dL


 


Calcium  8.3 L  (8.4-10.2)  mg/dL








                                  Adrenal panel











  07/12/19 Range/Units





  05:08 


 


Sodium  150 H  (137-145)  mmol/L


 


Potassium  3.8  (3.6-5.0)  mmol/L


 


Chloride  116.3 H  ()  mmol/L


 


Carbon Dioxide  23  (22-30)  mmol/L


 


BUN  18 H  (7-17)  mg/dL


 


Creatinine  1.0  (0.7-1.2)  mg/dL


 


Glucose  101 H  ()  mg/dL


 


Calcium  8.3 L  (8.4-10.2)  mg/dL


 


Total Bilirubin  0.60  (0.1-1.2)  mg/dL


 


AST  58 H  (5-40)  units/L


 


ALT  165 H  (7-56)  units/L


 


Alkaline Phosphatase  116  ()  units/L


 


Total Protein  5.5 L  (6.3-8.2)  g/dL


 


Albumin  2.8 L  (3.9-5)  g/dL

## 2019-07-12 NOTE — PROGRESS NOTE
Assessment and Plan


Acute resp failure s/p intubated , placed on vent 


- likely ARDS with Pulmonary edema- noncardiogenic, pulmonary following


- Patient was given Lasix, Echo reports a normal left ventricular size and 

function LVEF 45-50%.


- s/p extubation on 





Nausea/vomiting with abdominal distension


- NPO, s/p NG suction on clamped, serial abdominal xry - shows distended bowels 

w/o free air


-  CT abdomen showed extensive colonic dilatation, consulted GS - no surgical 

intervention needed


- tolerating diet now 





Labile blood pressure


Sinus tachycardia


s/p  on 19


GBS positive


History of herpes simplex 2


History of preeclampsia


NAZARIO probably ATN from hypotension


Leukocytosis





- Continue supportive care


Nephrology following, monitor BMP


Routine pulm toileting


Cardiology following


Pulmonology following


On Empiric Cefepime , ID following


DVT PPX on Lovenox





Medically stable. d/c planning 





Brief History:


38-year-old  female who is s/p  on 19.  Patient was 

transferred to IMCU for management of pulmonary edema.  Chest x-ray showed mild 

cardiomegaly and pulmonary edema. She was given lasix. On night of , worse 

respiratory distress, rapid response team called and she was intubated put on 

vent. Also now has NAZARIO, managed by nephrology. Pulmonary edema thought to be non

cardiogenic, likely ARDS. Patient transferred to ICU, extubated on . Now 

developed N/V/abdominal distension since the day of extubation. CT abdomen 

showed extensive colonic dilatation with gas, no obstruction, surgery 

recommended medical Mx. d/c planning per primary





Hospitalist Physical


Gen: Not in acute distress, lying in bed, 


HEENT: Normocephalic, atraumatic


Neck: supple, no JVD


Heart: S1 and S2 reg, no murmurs, rubs or gallop


Lungs: no Bilateral crackles, no wheeze


Abd: soft, non tender, distended, + BS


Ext: No edema, no clubbing, no cyanosis, 


Neuro: arouseable, follows commands, moves all ext











Subjective


Date of service: 19


Principal diagnosis: 1) POD #4 S/P 1LTCS  2)gestatinal hypertension 3) acute 

resp failur


Interval history: 





Patient seen and examined


no N/V, having BM


no abdominal pain











Objective





- Constitutional


Vitals: 


                               Vital Signs - 12hr











  19





  06:13 08:36 12:13


 


Temperature 98.0 F 97.6 F 97.8 F


 


Pulse Rate 75 97 H 100 H


 


Respiratory 16 18 18





Rate   


 


Blood Pressure 148/81 117/79 134/84


 


O2 Sat by Pulse 96 98 99





Oximetry   














- Labs


CBC & Chem 7: 


                                 19 04:09





                                 19 04:14


Labs: 


                              Abnormal lab results











  19 Range/Units





  01:43 05:08 05:08 


 


Sodium   150 H   (137-145)  mmol/L


 


Chloride   116.3 H   ()  mmol/L


 


BUN   18 H   (7-17)  mg/dL


 


Glucose   101 H   ()  mg/dL


 


POC Glucose  123 H    ()  


 


Calcium   8.3 L   (8.4-10.2)  mg/dL


 


AST   58 H   (5-40)  units/L


 


ALT   165 H   (7-56)  units/L


 


Total Protein   5.5 L   (6.3-8.2)  g/dL


 


Albumin   2.8 L   (3.9-5)  g/dL


 


Triglycerides    288 H  (2-149)  mg/dL


 


Lipase    399 H  (13-60)  units/L














  19 Range/Units





  13:24 


 


Sodium   (137-145)  mmol/L


 


Chloride   ()  mmol/L


 


BUN   (7-17)  mg/dL


 


Glucose   ()  mg/dL


 


POC Glucose  141 H  ()  


 


Calcium   (8.4-10.2)  mg/dL


 


AST   (5-40)  units/L


 


ALT   (7-56)  units/L


 


Total Protein   (6.3-8.2)  g/dL


 


Albumin   (3.9-5)  g/dL


 


Triglycerides   (2-149)  mg/dL


 


Lipase   (13-60)  units/L

## 2019-07-12 NOTE — PROGRESS NOTE
Assessment and Plan





37 y/o female with history of preeclampsia admitted on 2019 with 38 weeks 

gestation with uterine contractions. Patient underwent  on 19 due 

to failure to dilate, pulmonary edema and gestational hypertension;  after 

 she developed acute respiratory failure/ARDS and hypotension, now 

intubated: 





1) Leukocytosis:  Improved. likely reactive from labor/ and respiratory

failure. No evidence of infectious process thus far. CXR Chest x-ray showed mild

cardiomegaly and pulmonary edema. No consolidations. Repeat appears better. CRP:

14.6. DVT scan negative.





2) Acute respiratory failure: CXR with pulmonary edema likely from pre-

emclapsia. Doubt pneumonia. No recent vomiting, doubt aspiration.  





3) NAZARIO: renal on board. Improving creatinine.





4) ?ileus: GYN following.





5) ?RLL infiltrate noted on CT abdomen lower lung cuts: small infiltrate. 

Patient already received about 4-5 days of abx. Off oxygen, WBC improving. 

continue to avoid additional abx. D/w Dr. Nunn yesterday evening.





Recommendations:





- Clinically stable, continue to monitor off antibiotics





Dr. Burr will be on call this weekend, [phone], please call for questions.





[facility] 


M: [id number]


O:[phone]








Subjective


Date of service: 19


Principal diagnosis: 1) POD #4 S/P 1LTCS  2)gestatinal hypertension 3) acute 

resp failur


Interval history: 





Patient seen and examined. No acute distress reported. Sitting up on the side of

the bed eating. No fevers .





Objective





- Exam


Narrative Exam: 





Constitutional: awake, alert, no acute distress


Head, Ears, Nose: Normocephalic, atraumatic. External ears, nose normal


Eyes: Conjunctivae/corneas clear. No icterus. No ptosis.


Neck: Supple, no meningeal signs


Cardiovascular: S1, S2 normal. 


Respiratory: Good air entry, clear to auscultation bilaterally


GI: soft, mild tenderness; bowel sounds hypo. No peritoneal signs. Surgical 

incision c/d/i


Musculoskeletal: No pedal edema, no cyanosis.


Skin: No rash or abscess


Hem/Lymphatic: No palpable cervical or supraclavicular nodes. No lymphangitis


Psych: calm. 


Neurological: awake, alert, oriented





- Constitutional


Vitals: 


                                   Vital Signs











Temp Pulse Resp BP Pulse Ox


 


 97.6 F   97 H  18   117/79   98 


 


 19 08:36  19 08:36  19 08:36  19 08:36  19 08:36








                           Temperature -Last 24 Hours











Temperature                    97.6 F


 


Temperature                    98.0 F


 


Temperature                    97.7 F


 


Temperature                    98.6 F


 


Temperature                    97.7 F


 


Temperature                    98.3 F

















- Labs


CBC & Chem 7: 


                                 19 04:09





                                 19 05:08


Labs: 


                              Abnormal lab results











  19 Range/Units





  01:43 05:08 05:08 


 


Sodium   150 H   (137-145)  mmol/L


 


Chloride   116.3 H   ()  mmol/L


 


BUN   18 H   (7-17)  mg/dL


 


Glucose   101 H   ()  mg/dL


 


POC Glucose  123 H    ()  


 


Calcium   8.3 L   (8.4-10.2)  mg/dL


 


AST   58 H   (5-40)  units/L


 


ALT   165 H   (7-56)  units/L


 


Total Protein   5.5 L   (6.3-8.2)  g/dL


 


Albumin   2.8 L   (3.9-5)  g/dL


 


Triglycerides    288 H  (2-149)  mg/dL


 


Lipase    399 H  (13-60)  units/L

## 2019-07-13 VITALS — SYSTOLIC BLOOD PRESSURE: 132 MMHG | DIASTOLIC BLOOD PRESSURE: 84 MMHG

## 2019-07-13 LAB
BUN SERPL-MCNC: 11 MG/DL (ref 7–17)
BUN/CREAT SERPL: 11 %
CALCIUM SERPL-MCNC: 7.7 MG/DL (ref 8.4–10.2)
HEMOLYSIS INDEX: 9

## 2019-07-13 RX ADMIN — PANTOPRAZOLE SODIUM SCH: 40 TABLET, DELAYED RELEASE ORAL at 10:05

## 2019-07-13 RX ADMIN — PANTOPRAZOLE SODIUM SCH MG: 40 TABLET, DELAYED RELEASE ORAL at 08:53

## 2019-07-13 NOTE — PROGRESS NOTE
Assessment and Plan





- Patient Problems


(1) Nausea & vomiting


Current Visit: Yes   Status: Acute   


Plan to address problem: 


Pt stable. Issue appears resolved. Having BMs today. 





Rec: 


1) Ok to d/c home from our perspective


2) If home today, stay on full liquid diet for a few days and then advance as 

tolerated


3) If home tomorrow or later, will advance to soft diet tonight and assess 

tolerance tomorrow


4) Ambulate


5) f/u with Gen Surg prn





Please call with questions. 





Time=10min








Subjective


Date of service: 07/13/19


Patient Reports: Positive: no new complaints, feels better, tolerating liquids 

well, bowel movement.  Negative: nausea, vomiting





Objective


                               Vital Signs - 12hr











  07/12/19 07/13/19





  23:15 03:47


 


Temperature 97.9 F 98.0 F


 


Pulse Rate 82 80


 


Respiratory 20 20





Rate  


 


Blood Pressure 130/74 130/85


 


O2 Sat by Pulse 97 97





Oximetry  














- General physical appearance


no distress, no pain, other (looks well)





- Eyes


normal occular movement





- Abdomen


soft, not tender, bowel sounds hypoactive, distended (mild), not guarding, not 

rigid





- Integumentary


no rash, no growths, no abnormal pigmentation





- Psychiatric


oriented to time, oriented to person, oriented to place, speech is normal, 

memory intact





- Labs





                                 07/11/19 04:09





                                 07/13/19 04:14


                                 Diabetes panel











  07/13/19 Range/Units





  04:14 


 


Sodium  143  (137-145)  mmol/L


 


Potassium  3.5 L  (3.6-5.0)  mmol/L


 


Chloride  109.4 H  ()  mmol/L


 


Carbon Dioxide  25  (22-30)  mmol/L


 


BUN  11  (7-17)  mg/dL


 


Creatinine  1.0  (0.7-1.2)  mg/dL


 


Glucose  103 H  ()  mg/dL


 


Calcium  7.7 L  (8.4-10.2)  mg/dL








                                  Calcium panel











  07/13/19 Range/Units





  04:14 


 


Calcium  7.7 L  (8.4-10.2)  mg/dL








                                 Pituitary panel











  07/13/19 Range/Units





  04:14 


 


Sodium  143  (137-145)  mmol/L


 


Potassium  3.5 L  (3.6-5.0)  mmol/L


 


Chloride  109.4 H  ()  mmol/L


 


Carbon Dioxide  25  (22-30)  mmol/L


 


BUN  11  (7-17)  mg/dL


 


Creatinine  1.0  (0.7-1.2)  mg/dL


 


Glucose  103 H  ()  mg/dL


 


Calcium  7.7 L  (8.4-10.2)  mg/dL








                                  Adrenal panel











  07/13/19 Range/Units





  04:14 


 


Sodium  143  (137-145)  mmol/L


 


Potassium  3.5 L  (3.6-5.0)  mmol/L


 


Chloride  109.4 H  ()  mmol/L


 


Carbon Dioxide  25  (22-30)  mmol/L


 


BUN  11  (7-17)  mg/dL


 


Creatinine  1.0  (0.7-1.2)  mg/dL


 


Glucose  103 H  ()  mg/dL


 


Calcium  7.7 L  (8.4-10.2)  mg/dL

## 2019-07-13 NOTE — DISCHARGE SUMMARY
Providers





- Providers


Date of Admission: 


19 19:31





Date of discharge: 19


Attending physician: 


SAMM MCKINNEY





                                        





19 21:49


Consult to Physician [CONS] Urgent 


   Comment: 


   Consulting Provider: AMARJIT MILES


   Physician Instructions: 


   Reason For Exam: pulmonary edema, labile BP's





19 23:16


Consult to Lactation Consultant [CONS] Routine 


   Reason For Exam: 





19 23:38


Consult to Physician [CONS] Routine 


   Comment: 


   Consulting Provider: DEZ MISHRA


   Physician Instructions: 


   Reason For Exam: pulmonary edema s/p csection





19 22:12


Consult to Dietitian/Nutrition [CONS] Routine 


   Physician Instructions: 


   Reason For Exam: 


   Reason for Consult: Evaluate nutritional intake





19 08:07


Consult to Physician [CONS] Urgent 


   Comment: 


   Consulting Provider: BEHZAD BRAXTON


   Physician Instructions: 


   Reason For Exam: acute renal failure





19 11:18


Consult to Physician [CONS] Routine 


   Comment: 


   Consulting Provider: GISELE SAL


   Physician Instructions: 


   Reason For Exam: Leukocytosis,recent  on 19 12:08


Consult to Physician [CONS] Routine 


   Comment: 


   Consulting Provider: QAMAR CARL


   Physician Instructions: 


   Reason For Exam: distended colon with n/v











Primary care physician: 


SAMM MCKINNEY








Hospitalization


Condition: Good


Hospital course: 





This is a 38-year-old female  1 now para 1 who presented to labor and 

delivery complaining of contractions.  Her evaluation revealed an ANGUS of 5 cm 

and elevated blood pressures.  The decision was made to admit and start 

induction of labor for oligohydramnios and gestational hypertension.  Patient 

progressed to 5 cm however during her labor course she developed pulmonary 

edema.  She received 20 of Lasix IV.  She had no cervical change over a six-hour

 period and decision was made to proceed with  delivery.  She was 

immediately transferred to the IMCU where she remained stable overnight however 

on postop day #1 patient had acute respiratory failure.  She was intubated and 

moved to the ICU.  During the ICU course she also experienced acute renal 

failure, non-oliguric.  Her echocardiogram revealed grade 3 left ventricular 

diastolic dysfunction with an ejection fraction of 45-50%.  Patient was 

extubated on postoperative day #3.  She is also noted to have a distended 

abdomen for which she underwent a CT scan there really field distended colon 

however no evidence of obstruction or free air.  She had an NG placed which 

remained until post operative day 7.  Also noted patient had a slight elevation 

of LFTs and several episodes of hypokalemia for which she receives 

supplementation.  Patient had stated improvement in her postoperative course.  

She was transferred to the surgical unit and allow clear liquids which were 

advanced to a regular diet after her NG was removed.  She was voiding 

appropriately with normal L movements and flatus.  By postoperative day 8 

patient was stable for discharge.


Disposition: [id number] TO HOME OR SELFCARE





- Discharge Diagnoses


(1) Acute respiratory failure


Status: Resolved   


Qualifiers: 


   Respiratory failure complication: hypoxia   Qualified Code(s): J96.01 - Acute

 respiratory failure with hypoxia   





(2) Acute renal failure


Status: Resolved   


Qualifiers: 


   Acute renal failure type: with acute tubular necrosis   Qualified Code(s): 

N17.0 - Acute kidney failure with tubular necrosis   





(3)  delivery delivered


Status: Acute   





(4) Pulmonary edema


Status: Resolved   





(5) Gestational hypertension


Status: Resolved   


Qualifiers: 


   Trimester: third trimester   Qualified Code(s): O13.3 - Gestational 

[pregnancy-induced] hypertension without significant proteinuria, third 

trimester   





(6) Hypokalemia


Status: Acute   





(7) Elevated LFTs


Status: Resolved   





(8) Gestational diabetes


Status: Acute   





(9) Rh negative, delivered, current hospitalization


Status: Acute   





(10) 38 weeks gestation of pregnancy


Status: Resolved   





(11) Positive GBS test


Status: Resolved   





Core Measure Documentation





- Palliative Care


Palliative Care/ Comfort Measures: Not Applicable





- Core Measures


Any of the following diagnoses?: none





Exam





- Physical Exam


Narrative exam: 





see earlier note





- Constitutional


Vitals: 


                                        











Temp Pulse Resp BP Pulse Ox


 


 98.0 F   80   20   130/85   97 


 


 19 03:47  19 03:47  19 03:47  19 03:47  19 03:47














Plan


Activity: other (no sex.  No driving.  Ambulate approximately 1 mile and a 

property a day.  Void frequently to keep bladder empty.)


Weight Bearing Status: Weight Bear as Tolerated


Diet: low fat, low cholesterol, low salt, diabetic


Wound: open to air, keep clean and dry


Special Instructions: no heavy lifting (greater than 25 pounds)


Follow up with: 


FAWN CAMPBELL MD [Staff Physician] - 7 Days


ERNIE MANN MD [Staff Physician] - 7 Days


ZACHARY SEAY MD [Staff Physician] - 7 Days


SAMM MCKINNEY MD [Primary Care Provider] - 7 Days


Prescriptions: 


traMADol [Ultram 50 MG tab] 50 mg PO Q6HR PRN #20 tablet


 PRN Reason: Pain

## 2019-07-13 NOTE — PROGRESS NOTE
Assessment and Plan








S/P 


Acute Pulmonary edema:  Now resolved


Acute renal failure:  Now resolved


Acute respiratory failure:  Now resolved


Elevated troponin: Non specific


Pre-eclampsia





Echo reports a normal left ventricular size and low normal function with LVEF 

45-50%.





Recommend:


OK to discharge from cardiac perspective.


Outpatient f/u with Dr. Simpson within 1-2 weeks





Subjective


Date of service: 19


Principal diagnosis: 1) POD #8 S/P 1LTCS  2)s/p acute resp/renal failure


Interval history: 





No cardiac complaints and anxious to go home





Objective


                                   Vital Signs











  Temp Pulse Resp BP Pulse Ox


 


 19 03:47  98.0 F  80  20  130/85  97


 


 19 23:15  97.9 F  82  20  130/74  97


 


 19 21:50      100


 


 19 19:54  98.1 F  81  20  148/86  99


 


 19 17:05  97.8 F  94 H  18  151/88  97


 


 19 16:00   101 H   














- Physical Examination


General: No Apparent Distress


HEENT: Positive: PERRL


Neck: Positive: trachea midline


Lungs: Positive: clear to auscultation, Normal Breath Sounds


Neuro: Positive: Grossly Intact


Abdomen: Positive: Other (post C/S surgery)


Skin: Positive: Clear


Incision: Cardiac Cath Site


Musculoskeletal: No Pain, Normal Range of Motion


Extremities: Absent: edema





- Labs and Meds


                          Comprehensive Metabolic Panel











  19 Range/Units





  04:14 


 


Sodium  143  (137-145)  mmol/L


 


Potassium  3.5 L  (3.6-5.0)  mmol/L


 


Chloride  109.4 H  ()  mmol/L


 


Carbon Dioxide  25  (22-30)  mmol/L


 


BUN  11  (7-17)  mg/dL


 


Creatinine  1.0  (0.7-1.2)  mg/dL


 


Glucose  103 H  ()  mg/dL


 


Calcium  7.7 L  (8.4-10.2)  mg/dL

## 2019-07-13 NOTE — PROGRESS NOTE
Assessment and Plan





- Patient Problems


(1) Acute renal failure


Current Visit: Yes   Status: Resolved   


Qualifiers: 


   Acute renal failure type: with acute tubular necrosis   Qualified Code(s): 

N17.0 - Acute kidney failure with tubular necrosis   


Plan to address problem: 


Acute renal failure resolved


Labile blood pressures with hypotension with concern for tubular necrosis


Continue supportive care 


Avoid nephro toxic medications


Current creatinine 1.0 mg/dl








(2) Pulmonary edema


Current Visit: Yes   Status: Resolved   


Plan to address problem: 


Pulmonary edema


Has improved significantly


Still has lower extremity edema 


We'll use compression stockings


Sodium restriction








(3) Gestational diabetes


Current Visit: Yes   Status: Acute   


Plan to address problem: 


Gestational diabetes


Monitor fingersticks at risk for diabetes type 2








(4) Hypokalemia


Current Visit: Yes   Status: Acute   


Plan to address problem: 


Hypokalemia received 40 mEq potassium chloride








Subjective


Principal diagnosis: 1) POD #4 S/P 1LTCS  2)gestatinal hypertension 3) acute 

resp failur


Interval history: 





38-year-old lady with medical history significant for gestational diabetes, with

pulmonary edema post delivery requiring diuretics still has lower extremity 

edema


Patient is anxious to be discharged today


Denies any orthopnea PND


Denies any exertional dyspnea





Objective





- Vital Signs


Vital signs: 


                               Vital Signs - 12hr











  07/13/19





  03:47


 


Temperature 98.0 F


 


Pulse Rate 80


 


Respiratory 20





Rate 


 


Blood Pressure 130/85


 


O2 Sat by Pulse 97





Oximetry 














- General Appearance


General appearance: well-developed, well-nourished


EENT: ATNC, PERRL


Neck: no JVD


Respiratory: Present: Clear to Ascultation


Cardiology: regular, S1S2


Gastrointestinal: normal, normoactive bowel sounds


Integumentary: no rash


Neurologic: alert and oriented x3, CN 3-12 intact


Psychiatric: mood/affect appropriate





- Lab





                                 07/11/19 04:09





                                 07/13/19 04:14


                             Most recent lab results











Calcium  7.7 mg/dL (8.4-10.2)  L  07/13/19  04:14    


 


Phosphorus  3.30 mg/dL (2.5-4.5)  D 07/11/19  04:59    


 


Magnesium  2.20 mg/dL (1.7-2.3)   07/11/19  04:59    


 


  34.9 mg/dL (0.1-20.0)  H  07/07/19  10:19    


 


  39 mmol/L  07/10/19  16:40    














- Imaging


Chest x-ray: image reviewed





Medications & Allergies





- Medications


Allergies/Adverse Reactions: 


                                    Allergies





No Known Allergies Allergy (Verified 07/04/19 19:31)


   








Home Medications: 


                                Home Medications











 Medication  Instructions  Recorded  Confirmed  Last Taken  Type


 


traMADol [Ultram 50 MG tab] 50 mg PO Q6HR PRN #20 tablet 07/13/19  Unknown Rx











Active Medications: 














Generic Name Dose Route Start Last Admin





  Trade Name Freq  PRN Reason Stop Dose Admin


 


Albuterol  2.5 mg  07/10/19 00:12 





  Proventil  IH  





  Q4HRT PRN  





  Shortness Of Breath  


 


Dextrose  1,000 mls @ 60 mls/hr  07/12/19 10:00  07/12/19 14:11





  D5w  IV   60 mls/hr





  AS DIRECT AYAKA   Administration


 


Magnesium Hydroxide  30 ml  07/05/19 23:16 





  Milk Of Magnesia  PO  





  Q4H PRN  





  Constipation  


 


Metoclopramide HCl  10 mg  07/11/19 19:55 





  Reglan  PO  





  Q6H PRN  





  Nausea And Vomiting  


 


Morphine Sulfate  2 mg  07/12/19 00:16  07/12/19 00:44





  Morphine  IV   2 mg





  Q4H PRN   Administration





  Pain, Moderate (4-6)  


 


Multi-Ingred Cream/Lotion/Oil/Oint  1 applic  07/06/19 22:12 





  Artificial Tears Ophth Oint  OU  





  Q4HR PRN  





  Dry Eye(s)  


 


Multi-Ingredient Ointment  1 applic  07/05/19 23:16 





  Lansinoh  TP  





  PRN PRN  





  dryness/cracking  


 


Pantoprazole Sodium  40 mg  07/12/19 10:00  07/13/19 10:05





  Protonix  PO   Not Given





  BID AYAKA  


 


Potassium Chloride  40 meq  07/13/19 14:00  07/13/19 13:19





  K-Dur  PO  07/13/19 14:01  40 meq





  ONCE ONE   Administration


 


Tramadol HCl  50 mg  07/12/19 14:42  07/12/19 22:06





  Ultram  PO   50 mg





  Q6H PRN   Administration





  Pain, Moderate (4-6)  


 


Witch Hazel/Glycerin  1 each  07/05/19 23:16 





  Tucks Pad  TP  





  PRN PRN  





  Hemorrhoids/cleansing/soothing

## 2019-07-13 NOTE — PROGRESS NOTE
Assessment and Plan





Imp:


1. S/p  for intrauterine pregnancy at 38 weeks


2. Pulmonary edema -> probably combined cardiogenic and non-cardiogenic (rapid 

yet incomplete improvement), the former perhaps related to periods of elevated 

BP/IVFs and the latter of ? etiology (DDx includes aspiration, sepsis, AFE)


3. Acute respiratory failure, hypoxia


4. NAZARIO


5. Thrombocytopenia of ? etiology


6. Ileus


7. Hypernatremia


8. Hypokalemia


9. Transaminitis


10. Anion-gap metabolic acidosis, presumed related to #4 at this point


11. ? Acute pancreatitis





Rec:


1. Cultures are negative; CT a/p shows RLL infiltrate, possibly aspiration; 

completed a course of Cefipime per ID, and respiratory status is improving, so 

agree w/ holding further ABX for now


2. On RA; monitor respiratory status


3. SCDs while in bed; ambulatory now


4. Would optimize blood pressure given grade III diastolic dysfunction noted in 

Echo report


5. Drastically improved pulmonary-wise and she can go home from our standpoint; 

we could see her in 1-2 weeks and do one more CXR to ensure total resolution of 

infiltrates (d/w Dr. Amaya)





Plan of care reviewed w/ family in detail, they understand/agree; all questions 

have been answered





Subjective


Date of service: 19


Principal diagnosis: 1) POD #4 S/P 1LTCS  2)gestatinal hypertension 3) acute 

resp failur


Interval history: 





On RA. No SOB, chest pain, wheezing, N/V. Ambulating. Tolerating regular diet 

now. OOB to chair.





Active Medications





Albuterol (Proventil)  2.5 mg IH Q4HRT PRN


   PRN Reason: Shortness Of Breath


Dextrose (D5w)  1,000 mls @ 60 mls/hr IV AS DIRECT AYAKA


   Last Admin: 19 14:11 Dose:  60 mls/hr


   Documented by: 


Magnesium Hydroxide (Milk Of Magnesia)  30 ml PO Q4H PRN


   PRN Reason: Constipation


Metoclopramide HCl (Reglan)  10 mg PO Q6H PRN


   PRN Reason: Nausea And Vomiting


Morphine Sulfate (Morphine)  2 mg IV Q4H PRN


   PRN Reason: Pain, Moderate (4-6)


   Last Admin: 19 00:44 Dose:  2 mg


   Documented by: 


Multi-Ingred Cream/Lotion/Oil/Oint (Artificial Tears Ophth Oint)  1 applic OU 

Q4HR PRN


   PRN Reason: Dry Eye(s)


Multi-Ingredient Ointment (Lansinoh)  1 applic TP PRN PRN


   PRN Reason: dryness/cracking


Pantoprazole Sodium (Protonix)  40 mg PO BID AYAKA


   Last Admin: 19 10:05 Dose:  Not Given


   Documented by: 


Tramadol HCl (Ultram)  50 mg PO Q6H PRN


   PRN Reason: Pain, Moderate (4-6)


   Last Admin: 19 22:06 Dose:  50 mg


   Documented by: 


Witch Hazel/Glycerin (Tucks Pad)  1 each TP PRN PRN


   PRN Reason: Hemorrhoids/cleansing/soothing














Objective


Constitutional: no acute distress, alert


Eyes: non-icteric


ENT: oropharynx moist


Neck: supple


Effort: normal


Ascultation: Bilateral: clear


Cardiovascular: regular rate and rhythm (no mrg)


Gastrointestinal: normoactive bowel sounds, soft, other (distended, mildly TTP; 

no guarding/rebound)


Integumentary: normal


Extremities: no cyanosis, no edema, pink and warm


Neurologic: normal mental status, non-focal exam, pupils equal and round, CN II-

XII normal


Psychiatric: mood appropriate, affect normal


CBC and BMP: 


                                 19 04:09





                                 19 04:14


ABG, PT/INR, D-dimer: 


ABG











POC ABG pH  7.400  (7.35-7.45)   19  12:57    


 


POC ABG pCO2  30.6  (35-45)  L  19  12:57    


 


POC ABG pO2  125  ()  H  19  12:57    


 


POC ABG HCO3  19.0  (22-26 mml/L)   19  12:57    


 


POC ABG Total CO2  20  (23-27mmol/L)   19  12:57    


 


POC ABG O2 Sat  99   19  12:57    





PT/INR, D-dimer











PT  13.2 Sec. (12.2-14.9)   19  14:24    


 


INR  1.03  (0.87-1.13)   19  14:24    


 


  > 70416 ng/mlDDU (0-234)  H  19  14:24    








Abnormal lab findings: 


                                  Abnormal Labs











  19





  19:45 16:12 16:12


 


WBC  12.3 H  16.3 H 


 


RBC   


 


Hgb   


 


Hct   


 


MCHC   


 


RDW  16.9 H  16.6 H 


 


Plt Count   


 


Lymph % (Auto)   


 


Mono % (Auto)   


 


Lymph #   


 


Mono #   


 


Seg Neutrophils %   


 


Seg Neuts % (Manual)   


 


Lymphocytes % (Manual)   


 


Seg Neutrophils #   


 


Seg Neutrophils # Man   


 


APTT   


 


Fibrinogen   


 


D-Dimer   


 


POC ABG pH   


 


POC ABG pCO2   


 


POC ABG pO2   


 


Sodium   


 


Potassium   


 


Chloride   


 


Carbon Dioxide   


 


BUN   


 


Creatinine    0.5 L


 


Glucose   


 


POC Glucose   


 


Calcium   


 


Phosphorus   


 


Magnesium   


 


Direct Bilirubin   


 


AST   


 


ALT   


 


Lactate Dehydrogenase    248 H


 


Total Creatine Kinase   


 


CK-MB (CK-2)   


 


CK-MB (CK-2) Rel Index   


 


Troponin T   


 


C-Reactive Protein   


 


NT-Pro-B Natriuret Pep   


 


Total Protein   


 


Albumin   


 


Triglycerides   


 


Cholesterol   


 


HDL Cholesterol   


 


Lipase   


 


Urine WBC (Auto)   


 


Urine Creatinine   


 


Miscellaneous Test   














  19





  23:41 23:41 23:41


 


WBC  18.3 H  


 


RBC   


 


Hgb   


 


Hct   


 


MCHC   


 


RDW  16.6 H  


 


Plt Count   


 


Lymph % (Auto)  3.5 L  


 


Mono % (Auto)  7.7 H  


 


Lymph #  0.6 L  


 


Mono #  1.4 H  


 


Seg Neutrophils %  88.7 H  


 


Seg Neuts % (Manual)   


 


Lymphocytes % (Manual)   


 


Seg Neutrophils #  16.2 H  


 


Seg Neutrophils # Man   


 


APTT   


 


Fibrinogen   


 


D-Dimer   


 


POC ABG pH   


 


POC ABG pCO2   


 


POC ABG pO2   


 


Sodium   


 


Potassium   


 


Chloride   


 


Carbon Dioxide   


 


BUN   


 


Creatinine   


 


Glucose   


 


POC Glucose   


 


Calcium   


 


Phosphorus   


 


Magnesium   


 


Direct Bilirubin   


 


AST   


 


ALT   


 


Lactate Dehydrogenase   


 


Total Creatine Kinase   


 


CK-MB (CK-2)   


 


CK-MB (CK-2) Rel Index   


 


Troponin T    0.274 H*


 


C-Reactive Protein   


 


NT-Pro-B Natriuret Pep   5198 H 


 


Total Protein   


 


Albumin   


 


Triglycerides    540 H


 


Cholesterol    291 H


 


HDL Cholesterol    60 H


 


Lipase   


 


Urine WBC (Auto)   


 


Urine Creatinine   


 


Miscellaneous Test   














  19





  23:41 04:40 04:40


 


WBC   


 


RBC   


 


Hgb   


 


Hct   


 


MCHC   


 


RDW   


 


Plt Count   


 


Lymph % (Auto)   


 


Mono % (Auto)   


 


Lymph #   


 


Mono #   


 


Seg Neutrophils %   


 


Seg Neuts % (Manual)   


 


Lymphocytes % (Manual)   


 


Seg Neutrophils #   


 


Seg Neutrophils # Man   


 


APTT   


 


Fibrinogen   


 


D-Dimer   


 


POC ABG pH   


 


POC ABG pCO2   


 


POC ABG pO2   


 


Sodium   


 


Potassium   


 


Chloride    108.3 H


 


Carbon Dioxide    21 L


 


BUN   


 


Creatinine   


 


Glucose    195 H


 


POC Glucose   


 


Calcium    7.7 L


 


Phosphorus   


 


Magnesium   


 


Direct Bilirubin   


 


AST   


 


ALT   


 


Lactate Dehydrogenase   


 


Total Creatine Kinase  155 H  


 


CK-MB (CK-2)  11.4 H  


 


CK-MB (CK-2) Rel Index  7.3 H  


 


Troponin T   0.177 H* D 


 


C-Reactive Protein   


 


NT-Pro-B Natriuret Pep   


 


Total Protein   


 


Albumin   


 


Triglycerides   


 


Cholesterol   


 


HDL Cholesterol   


 


Lipase   


 


Urine WBC (Auto)   


 


Urine Creatinine   


 


Miscellaneous Test   














  19





  08:35 09:47 22:35


 


WBC    20.8 H


 


RBC   


 


Hgb   


 


Hct   


 


MCHC   


 


RDW    17.2 H


 


Plt Count   


 


Lymph % (Auto)   


 


Mono % (Auto)   


 


Lymph #   


 


Mono #   


 


Seg Neutrophils %   


 


Seg Neuts % (Manual)    79.0 H


 


Lymphocytes % (Manual)    12.0 L


 


Seg Neutrophils #   


 


Seg Neutrophils # Man    16.4 H


 


APTT   


 


Fibrinogen   


 


D-Dimer   


 


POC ABG pH   


 


POC ABG pCO2   


 


POC ABG pO2   


 


Sodium   


 


Potassium   


 


Chloride   


 


Carbon Dioxide   


 


BUN   


 


Creatinine   


 


Glucose   


 


POC Glucose   


 


Calcium   


 


Phosphorus   


 


Magnesium   


 


Direct Bilirubin   


 


AST   


 


ALT   


 


Lactate Dehydrogenase   


 


Total Creatine Kinase   


 


CK-MB (CK-2)   


 


CK-MB (CK-2) Rel Index   


 


Troponin T   0.157 H* 


 


C-Reactive Protein   


 


NT-Pro-B Natriuret Pep   


 


Total Protein   


 


Albumin   


 


Triglycerides   


 


Cholesterol   


 


HDL Cholesterol   


 


Lipase   


 


Urine WBC (Auto)  37.0 H  


 


Urine Creatinine   


 


Miscellaneous Test   














  19





  22:35 22:35 22:44


 


WBC   


 


RBC   


 


Hgb   


 


Hct   


 


MCHC   


 


RDW   


 


Plt Count   


 


Lymph % (Auto)   


 


Mono % (Auto)   


 


Lymph #   


 


Mono #   


 


Seg Neutrophils %   


 


Seg Neuts % (Manual)   


 


Lymphocytes % (Manual)   


 


Seg Neutrophils #   


 


Seg Neutrophils # Man   


 


APTT  20.7 L  


 


Fibrinogen   


 


D-Dimer   


 


POC ABG pH    7.046 L


 


POC ABG pCO2    > 70 H


 


POC ABG pO2    62 L


 


Sodium   


 


Potassium   


 


Chloride   


 


Carbon Dioxide   


 


BUN   


 


Creatinine   


 


Glucose   


 


POC Glucose   


 


Calcium   


 


Phosphorus   


 


Magnesium   


 


Direct Bilirubin   


 


AST   


 


ALT   


 


Lactate Dehydrogenase   


 


Total Creatine Kinase   267 H 


 


CK-MB (CK-2)   9.7 H 


 


CK-MB (CK-2) Rel Index   


 


Troponin T   0.313 H* D 


 


C-Reactive Protein   


 


NT-Pro-B Natriuret Pep   


 


Total Protein   


 


Albumin   


 


Triglycerides   


 


Cholesterol   


 


HDL Cholesterol   


 


Lipase   


 


Urine WBC (Auto)   


 


Urine Creatinine   


 


Miscellaneous Test   














  19





  00:03 01:24 04:28


 


WBC   


 


RBC   


 


Hgb   


 


Hct   


 


MCHC   


 


RDW   


 


Plt Count   


 


Lymph % (Auto)   


 


Mono % (Auto)   


 


Lymph #   


 


Mono #   


 


Seg Neutrophils %   


 


Seg Neuts % (Manual)   


 


Lymphocytes % (Manual)   


 


Seg Neutrophils #   


 


Seg Neutrophils # Man   


 


APTT   


 


Fibrinogen   


 


D-Dimer   


 


POC ABG pH   7.246 L 


 


POC ABG pCO2   47.0 H 


 


POC ABG pO2   72 L 


 


Sodium   


 


Potassium   


 


Chloride   


 


Carbon Dioxide   


 


BUN   


 


Creatinine   


 


Glucose   


 


POC Glucose  152 H  


 


Calcium   


 


Phosphorus   


 


Magnesium   


 


Direct Bilirubin   


 


AST   


 


ALT   


 


Lactate Dehydrogenase   


 


Total Creatine Kinase   


 


CK-MB (CK-2)   


 


CK-MB (CK-2) Rel Index   


 


Troponin T    0.740 H* D


 


C-Reactive Protein   


 


NT-Pro-B Natriuret Pep   


 


Total Protein   


 


Albumin   


 


Triglycerides   


 


Cholesterol   


 


HDL Cholesterol   


 


Lipase   


 


Urine WBC (Auto)   


 


Urine Creatinine   


 


Miscellaneous Test   














  19





  05:33 05:53 10:19


 


WBC   


 


RBC   


 


Hgb   


 


Hct   


 


MCHC   


 


RDW   


 


Plt Count   


 


Lymph % (Auto)   


 


Mono % (Auto)   


 


Lymph #   


 


Mono #   


 


Seg Neutrophils %   


 


Seg Neuts % (Manual)   


 


Lymphocytes % (Manual)   


 


Seg Neutrophils #   


 


Seg Neutrophils # Man   


 


APTT   


 


Fibrinogen   


 


D-Dimer   


 


POC ABG pH   7.328 L 


 


POC ABG pCO2   


 


POC ABG pO2   256 H 


 


Sodium   


 


Potassium   


 


Chloride   


 


Carbon Dioxide   


 


BUN   


 


Creatinine   


 


Glucose   


 


POC Glucose  153 H  


 


Calcium   


 


Phosphorus   


 


Magnesium   


 


Direct Bilirubin   


 


AST   


 


ALT   


 


Lactate Dehydrogenase   


 


Total Creatine Kinase   


 


CK-MB (CK-2)   


 


CK-MB (CK-2) Rel Index   


 


Troponin T   


 


C-Reactive Protein   


 


NT-Pro-B Natriuret Pep   


 


Total Protein   


 


Albumin   


 


Triglycerides   


 


Cholesterol   


 


HDL Cholesterol   


 


Lipase   


 


Urine WBC (Auto)   


 


Urine Creatinine    34.9 H


 


Miscellaneous Test   














  19





  10:38 10:38 12:43


 


WBC   


 


RBC   


 


Hgb   


 


Hct   


 


MCHC   


 


RDW   


 


Plt Count   


 


Lymph % (Auto)   


 


Mono % (Auto)   


 


Lymph #   


 


Mono #   


 


Seg Neutrophils %   


 


Seg Neuts % (Manual)   


 


Lymphocytes % (Manual)   


 


Seg Neutrophils #   


 


Seg Neutrophils # Man   


 


APTT   


 


Fibrinogen   


 


D-Dimer   


 


POC ABG pH   


 


POC ABG pCO2   


 


POC ABG pO2   


 


Sodium    134 L


 


Potassium   


 


Chloride   


 


Carbon Dioxide    18 L


 


BUN    21 H


 


Creatinine    3.0 H


 


Glucose    166 H


 


POC Glucose   


 


Calcium    7.6 L


 


Phosphorus   


 


Magnesium   


 


Direct Bilirubin   


 


AST   


 


ALT   


 


Lactate Dehydrogenase   


 


Total Creatine Kinase   431 H 


 


CK-MB (CK-2)   


 


CK-MB (CK-2) Rel Index   


 


Troponin T  0.473 H* D  


 


C-Reactive Protein   


 


NT-Pro-B Natriuret Pep    92872 H


 


Total Protein   


 


Albumin   


 


Triglycerides   


 


Cholesterol   


 


HDL Cholesterol   


 


Lipase   


 


Urine WBC (Auto)   


 


Urine Creatinine   


 


Miscellaneous Test   














  19





  12:43 13:02 14:11


 


WBC  16.1 H  


 


RBC  3.47 L  


 


Hgb   


 


Hct   


 


MCHC  35 H  


 


RDW  17.1 H  


 


Plt Count  129 L  


 


Lymph % (Auto)   


 


Mono % (Auto)   


 


Lymph #   


 


Mono #   


 


Seg Neutrophils %   


 


Seg Neuts % (Manual)   


 


Lymphocytes % (Manual)   


 


Seg Neutrophils #   


 


Seg Neutrophils # Man   


 


APTT   


 


Fibrinogen   


 


D-Dimer   


 


POC ABG pH   


 


POC ABG pCO2   


 


POC ABG pO2   


 


Sodium   


 


Potassium   


 


Chloride   


 


Carbon Dioxide   


 


BUN   


 


Creatinine   


 


Glucose   


 


POC Glucose   163 H 


 


Calcium   


 


Phosphorus   


 


Magnesium   


 


Direct Bilirubin   


 


AST   


 


ALT   


 


Lactate Dehydrogenase   


 


Total Creatine Kinase   


 


CK-MB (CK-2)   


 


CK-MB (CK-2) Rel Index   


 


Troponin T   


 


C-Reactive Protein    14.60 H


 


NT-Pro-B Natriuret Pep   


 


Total Protein   


 


Albumin   


 


Triglycerides   


 


Cholesterol   


 


HDL Cholesterol   


 


Lipase   


 


Urine WBC (Auto)   


 


Urine Creatinine   


 


Miscellaneous Test   














  19





  17:39 23:02 Unknown


 


WBC   


 


RBC   


 


Hgb   


 


Hct   


 


MCHC   


 


RDW   


 


Plt Count   


 


Lymph % (Auto)   


 


Mono % (Auto)   


 


Lymph #   


 


Mono #   


 


Seg Neutrophils %   


 


Seg Neuts % (Manual)   


 


Lymphocytes % (Manual)   


 


Seg Neutrophils #   


 


Seg Neutrophils # Man   


 


APTT   


 


Fibrinogen   


 


D-Dimer   


 


POC ABG pH   


 


POC ABG pCO2   


 


POC ABG pO2   


 


Sodium    134 L D


 


Potassium   


 


Chloride    97.9 L


 


Carbon Dioxide    18 L


 


BUN   


 


Creatinine    2.3 H D


 


Glucose    225 H


 


POC Glucose  162 H  155 H 


 


Calcium    7.4 L


 


Phosphorus   


 


Magnesium   


 


Direct Bilirubin   


 


AST   


 


ALT   


 


Lactate Dehydrogenase   


 


Total Creatine Kinase   


 


CK-MB (CK-2)   


 


CK-MB (CK-2) Rel Index   


 


Troponin T   


 


C-Reactive Protein   


 


NT-Pro-B Natriuret Pep   


 


Total Protein   


 


Albumin   


 


Triglycerides   


 


Cholesterol   


 


HDL Cholesterol   


 


Lipase   


 


Urine WBC (Auto)   


 


Urine Creatinine   


 


Miscellaneous Test   














  19





  03:44 03:44 05:07


 


WBC  14.8 H  


 


RBC  3.12 L  


 


Hgb  9.8 L  


 


Hct  28.5 L  


 


MCHC   


 


RDW  17.5 H  


 


Plt Count  127 L  


 


Lymph % (Auto)   


 


Mono % (Auto)   


 


Lymph #   


 


Mono #   


 


Seg Neutrophils %   


 


Seg Neuts % (Manual)   


 


Lymphocytes % (Manual)   


 


Seg Neutrophils #   


 


Seg Neutrophils # Man   


 


APTT   


 


Fibrinogen   


 


D-Dimer   


 


POC ABG pH   


 


POC ABG pCO2   


 


POC ABG pO2    114 H


 


Sodium   


 


Potassium   3.0 L 


 


Chloride   


 


Carbon Dioxide   18 L 


 


BUN   28 H 


 


Creatinine   3.1 H 


 


Glucose   139 H 


 


POC Glucose   


 


Calcium   7.7 L 


 


Phosphorus   


 


Magnesium   


 


Direct Bilirubin   


 


AST   


 


ALT   


 


Lactate Dehydrogenase   


 


Total Creatine Kinase   


 


CK-MB (CK-2)   


 


CK-MB (CK-2) Rel Index   


 


Troponin T   


 


C-Reactive Protein   


 


NT-Pro-B Natriuret Pep   


 


Total Protein   5.3 L 


 


Albumin   2.5 L 


 


Triglycerides   


 


Cholesterol   


 


HDL Cholesterol   


 


Lipase   


 


Urine WBC (Auto)   


 


Urine Creatinine   


 


Miscellaneous Test   














  19





  05:36 05:37 12:45


 


WBC   


 


RBC   


 


Hgb   


 


Hct   


 


MCHC   


 


RDW   


 


Plt Count   


 


Lymph % (Auto)   


 


Mono % (Auto)   


 


Lymph #   


 


Mono #   


 


Seg Neutrophils %   


 


Seg Neuts % (Manual)   


 


Lymphocytes % (Manual)   


 


Seg Neutrophils #   


 


Seg Neutrophils # Man   


 


APTT   


 


Fibrinogen   


 


D-Dimer   


 


POC ABG pH   


 


POC ABG pCO2   


 


POC ABG pO2   


 


Sodium   


 


Potassium   


 


Chloride   


 


Carbon Dioxide   


 


BUN   


 


Creatinine   


 


Glucose   


 


POC Glucose   151 H  170 H


 


Calcium   


 


Phosphorus   


 


Magnesium   


 


Direct Bilirubin   


 


AST   


 


ALT   


 


Lactate Dehydrogenase   


 


Total Creatine Kinase   


 


CK-MB (CK-2)   


 


CK-MB (CK-2) Rel Index   


 


Troponin T   


 


C-Reactive Protein   


 


NT-Pro-B Natriuret Pep   


 


Total Protein   


 


Albumin   


 


Triglycerides   


 


Cholesterol   


 


HDL Cholesterol   


 


Lipase   


 


Urine WBC (Auto)   


 


Urine Creatinine   


 


Miscellaneous Test  Flexitest 1 H  














  19





  14:24 18:05 23:41


 


WBC   


 


RBC   


 


Hgb   


 


Hct   


 


MCHC   


 


RDW   


 


Plt Count   


 


Lymph % (Auto)   


 


Mono % (Auto)   


 


Lymph #   


 


Mono #   


 


Seg Neutrophils %   


 


Seg Neuts % (Manual)   


 


Lymphocytes % (Manual)   


 


Seg Neutrophils #   


 


Seg Neutrophils # Man   


 


APTT  21.7 L  


 


Fibrinogen  586 H  


 


D-Dimer  > 52357 H  


 


POC ABG pH   


 


POC ABG pCO2   


 


POC ABG pO2   


 


Sodium   


 


Potassium   


 


Chloride   


 


Carbon Dioxide   


 


BUN   


 


Creatinine   


 


Glucose   


 


POC Glucose   168 H  149 H


 


Calcium   


 


Phosphorus   


 


Magnesium   


 


Direct Bilirubin   


 


AST   


 


ALT   


 


Lactate Dehydrogenase   


 


Total Creatine Kinase   


 


CK-MB (CK-2)   


 


CK-MB (CK-2) Rel Index   


 


Troponin T   


 


C-Reactive Protein   


 


NT-Pro-B Natriuret Pep   


 


Total Protein   


 


Albumin   


 


Triglycerides   


 


Cholesterol   


 


HDL Cholesterol   


 


Lipase   


 


Urine WBC (Auto)   


 


Urine Creatinine   


 


Miscellaneous Test   














  19





  04:13 04:29 04:29


 


WBC   17.7 H 


 


RBC   


 


Hgb   


 


Hct   


 


MCHC   


 


RDW   17.3 H 


 


Plt Count   


 


Lymph % (Auto)   5.9 L 


 


Mono % (Auto)   8.2 H 


 


Lymph #   1.0 L 


 


Mono #   1.4 H 


 


Seg Neutrophils %   85.6 H 


 


Seg Neuts % (Manual)   


 


Lymphocytes % (Manual)   


 


Seg Neutrophils #   15.2 H 


 


Seg Neutrophils # Man   


 


APTT   


 


Fibrinogen   


 


D-Dimer   


 


POC ABG pH   


 


POC ABG pCO2  < 30 L  


 


POC ABG pO2  122 H  


 


Sodium    146 H D


 


Potassium    2.8 L*


 


Chloride    108.9 H


 


Carbon Dioxide    17 L


 


BUN    33 H


 


Creatinine    2.3 H


 


Glucose    146 H


 


POC Glucose   


 


Calcium   


 


Phosphorus   


 


Magnesium   


 


Direct Bilirubin   


 


AST    82 H


 


ALT    83 H


 


Lactate Dehydrogenase   


 


Total Creatine Kinase   


 


CK-MB (CK-2)   


 


CK-MB (CK-2) Rel Index   


 


Troponin T   


 


C-Reactive Protein   


 


NT-Pro-B Natriuret Pep   


 


Total Protein    6.2 L


 


Albumin    2.6 L


 


Triglycerides   


 


Cholesterol   


 


HDL Cholesterol   


 


Lipase   


 


Urine WBC (Auto)   


 


Urine Creatinine   


 


Miscellaneous Test   














  19





  05:26 12:33 12:57


 


WBC   


 


RBC   


 


Hgb   


 


Hct   


 


MCHC   


 


RDW   


 


Plt Count   


 


Lymph % (Auto)   


 


Mono % (Auto)   


 


Lymph #   


 


Mono #   


 


Seg Neutrophils %   


 


Seg Neuts % (Manual)   


 


Lymphocytes % (Manual)   


 


Seg Neutrophils #   


 


Seg Neutrophils # Man   


 


APTT   


 


Fibrinogen   


 


D-Dimer   


 


POC ABG pH   


 


POC ABG pCO2    30.6 L


 


POC ABG pO2    125 H


 


Sodium   


 


Potassium   


 


Chloride   


 


Carbon Dioxide   


 


BUN   


 


Creatinine   


 


Glucose   


 


POC Glucose  138 H  140 H 


 


Calcium   


 


Phosphorus   


 


Magnesium   


 


Direct Bilirubin   


 


AST   


 


ALT   


 


Lactate Dehydrogenase   


 


Total Creatine Kinase   


 


CK-MB (CK-2)   


 


CK-MB (CK-2) Rel Index   


 


Troponin T   


 


C-Reactive Protein   


 


NT-Pro-B Natriuret Pep   


 


Total Protein   


 


Albumin   


 


Triglycerides   


 


Cholesterol   


 


HDL Cholesterol   


 


Lipase   


 


Urine WBC (Auto)   


 


Urine Creatinine   


 


Miscellaneous Test   














  19





  18:22 19:02 19:02


 


WBC   


 


RBC   


 


Hgb   


 


Hct   


 


MCHC   


 


RDW   


 


Plt Count   


 


Lymph % (Auto)   


 


Mono % (Auto)   


 


Lymph #   


 


Mono #   


 


Seg Neutrophils %   


 


Seg Neuts % (Manual)   


 


Lymphocytes % (Manual)   


 


Seg Neutrophils #   


 


Seg Neutrophils # Man   


 


APTT   


 


Fibrinogen   


 


D-Dimer   


 


POC ABG pH   


 


POC ABG pCO2   


 


POC ABG pO2   


 


Sodium   


 


Potassium   3.5 L D  3.5 L


 


Chloride   111.4 H 


 


Carbon Dioxide   17 L 


 


BUN   32 H 


 


Creatinine   1.7 H 


 


Glucose   156 H 


 


POC Glucose  156 H  


 


Calcium   


 


Phosphorus   


 


Magnesium   


 


Direct Bilirubin   


 


AST   


 


ALT   


 


Lactate Dehydrogenase   


 


Total Creatine Kinase   


 


CK-MB (CK-2)   


 


CK-MB (CK-2) Rel Index   


 


Troponin T   


 


C-Reactive Protein   


 


NT-Pro-B Natriuret Pep   


 


Total Protein   


 


Albumin   


 


Triglycerides   


 


Cholesterol   


 


HDL Cholesterol   


 


Lipase   


 


Urine WBC (Auto)   


 


Urine Creatinine   


 


Miscellaneous Test   














  07/10/19 07/10/19 07/10/19





  03:55 03:55 03:55


 


WBC   


 


RBC   


 


Hgb   


 


Hct   


 


MCHC   


 


RDW   


 


Plt Count   


 


Lymph % (Auto)   


 


Mono % (Auto)   


 


Lymph #   


 


Mono #   


 


Seg Neutrophils %   


 


Seg Neuts % (Manual)   


 


Lymphocytes % (Manual)   


 


Seg Neutrophils #   


 


Seg Neutrophils # Man   


 


APTT   


 


Fibrinogen   


 


D-Dimer   


 


POC ABG pH   


 


POC ABG pCO2   


 


POC ABG pO2   


 


Sodium  150 H  


 


Potassium  3.1 L  


 


Chloride  115.2 H  


 


Carbon Dioxide  20 L  


 


BUN  31 H  


 


Creatinine  1.6 H  


 


Glucose  139 H  


 


POC Glucose   


 


Calcium   


 


Phosphorus    2.20 L


 


Magnesium    2.40 H


 


Direct Bilirubin   0.4 H 


 


AST   120 H 


 


ALT   175 H 


 


Lactate Dehydrogenase   


 


Total Creatine Kinase   


 


CK-MB (CK-2)   


 


CK-MB (CK-2) Rel Index   


 


Troponin T   


 


C-Reactive Protein   


 


NT-Pro-B Natriuret Pep   


 


Total Protein   5.6 L 


 


Albumin   2.8 L 


 


Triglycerides   


 


Cholesterol   


 


HDL Cholesterol   


 


Lipase   170 H 


 


Urine WBC (Auto)   


 


Urine Creatinine   


 


Miscellaneous Test   














  07/10/19 07/10/19 07/10/19





  06:04 11:34 17:10


 


WBC   


 


RBC   


 


Hgb   


 


Hct   


 


MCHC   


 


RDW   


 


Plt Count   


 


Lymph % (Auto)   


 


Mono % (Auto)   


 


Lymph #   


 


Mono #   


 


Seg Neutrophils %   


 


Seg Neuts % (Manual)   


 


Lymphocytes % (Manual)   


 


Seg Neutrophils #   


 


Seg Neutrophils # Man   


 


APTT   


 


Fibrinogen   


 


D-Dimer   


 


POC ABG pH   


 


POC ABG pCO2   


 


POC ABG pO2   


 


Sodium   


 


Potassium   


 


Chloride   


 


Carbon Dioxide   


 


BUN   


 


Creatinine   


 


Glucose   


 


POC Glucose  125 H  139 H  165 H


 


Calcium   


 


Phosphorus   


 


Magnesium   


 


Direct Bilirubin   


 


AST   


 


ALT   


 


Lactate Dehydrogenase   


 


Total Creatine Kinase   


 


CK-MB (CK-2)   


 


CK-MB (CK-2) Rel Index   


 


Troponin T   


 


C-Reactive Protein   


 


NT-Pro-B Natriuret Pep   


 


Total Protein   


 


Albumin   


 


Triglycerides   


 


Cholesterol   


 


HDL Cholesterol   


 


Lipase   


 


Urine WBC (Auto)   


 


Urine Creatinine   


 


Miscellaneous Test   














  19





  04:09 04:09 04:09


 


WBC   11.5 H 


 


RBC   3.64 L 


 


Hgb   


 


Hct   


 


MCHC   


 


RDW   17.9 H 


 


Plt Count   


 


Lymph % (Auto)   11.5 L 


 


Mono % (Auto)   11.5 H 


 


Lymph #   


 


[place] #   1.3 H 


 


Seg Neutrophils %   74.0 H 


 


Seg Neuts % (Manual)   


 


Lymphocytes % (Manual)   


 


Seg Neutrophils #   8.5 H 


 


Seg Neutrophils # Man   


 


APTT   


 


Fibrinogen   


 


D-Dimer   


 


POC ABG pH   


 


POC ABG pCO2   


 


POC ABG pO2   


 


Sodium  152 H  


 


Potassium  3.2 L  


 


Chloride  116.9 H  


 


Carbon Dioxide   


 


BUN  24 H  


 


Creatinine   


 


Glucose  146 H  


 


POC Glucose   


 


Calcium  8.2 L  


 


Phosphorus   


 


Magnesium   


 


Direct Bilirubin    0.3 H


 


AST    112 H


 


ALT    200 H


 


Lactate Dehydrogenase   


 


Total Creatine Kinase   


 


CK-MB (CK-2)   


 


CK-MB (CK-2) Rel Index   


 


Troponin T   


 


C-Reactive Protein   


 


NT-Pro-B Natriuret Pep   


 


Total Protein    5.6 L


 


Albumin    2.7 L


 


Triglycerides   


 


Cholesterol   


 


HDL Cholesterol   


 


Lipase   


 


Urine WBC (Auto)   


 


Urine Creatinine   


 


Miscellaneous Test   














  19





  05:32 01:43 05:08


 


WBC   


 


RBC   


 


Hgb   


 


Hct   


 


MCHC   


 


RDW   


 


Plt Count   


 


Lymph % (Auto)   


 


Mono % (Auto)   


 


Lymph #   


 


Mono #   


 


Seg Neutrophils %   


 


Seg Neuts % (Manual)   


 


Lymphocytes % (Manual)   


 


Seg Neutrophils #   


 


Seg Neutrophils # Man   


 


APTT   


 


Fibrinogen   


 


D-Dimer   


 


POC ABG pH   


 


POC ABG pCO2   


 


POC ABG pO2   


 


Sodium    150 H


 


Potassium   


 


Chloride    116.3 H


 


Carbon Dioxide   


 


BUN    18 H


 


Creatinine   


 


Glucose    101 H


 


POC Glucose  141 H  123 H 


 


Calcium    8.3 L


 


Phosphorus   


 


Magnesium   


 


Direct Bilirubin   


 


AST    58 H


 


ALT    165 H


 


Lactate Dehydrogenase   


 


Total Creatine Kinase   


 


CK-MB (CK-2)   


 


CK-MB (CK-2) Rel Index   


 


Troponin T   


 


C-Reactive Protein   


 


NT-Pro-B Natriuret Pep   


 


Total Protein    5.5 L


 


Albumin    2.8 L


 


Triglycerides   


 


Cholesterol   


 


HDL Cholesterol   


 


Lipase   


 


Urine WBC (Auto)   


 


Urine Creatinine   


 


Miscellaneous Test   














  19





  05:08 13:24 17:09


 


WBC   


 


RBC   


 


Hgb   


 


Hct   


 


MCHC   


 


RDW   


 


Plt Count   


 


Lymph % (Auto)   


 


Mono % (Auto)   


 


Lymph #   


 


Mono #   


 


Seg Neutrophils %   


 


Seg Neuts % (Manual)   


 


Lymphocytes % (Manual)   


 


Seg Neutrophils #   


 


Seg Neutrophils # Man   


 


APTT   


 


Fibrinogen   


 


D-Dimer   


 


POC ABG pH   


 


POC ABG pCO2   


 


POC ABG pO2   


 


Sodium   


 


Potassium   


 


Chloride   


 


Carbon Dioxide   


 


BUN   


 


Creatinine   


 


Glucose   


 


POC Glucose   141 H  111 H


 


Calcium   


 


Phosphorus   


 


Magnesium   


 


Direct Bilirubin   


 


AST   


 


ALT   


 


Lactate Dehydrogenase   


 


Total Creatine Kinase   


 


CK-MB (CK-2)   


 


CK-MB (CK-2) Rel Index   


 


Troponin T   


 


C-Reactive Protein   


 


NT-Pro-B Natriuret Pep   


 


Total Protein   


 


Albumin   


 


Triglycerides  288 H  


 


Cholesterol   


 


HDL Cholesterol   


 


Lipase  399 H  


 


Urine WBC (Auto)   


 


Urine Creatinine   


 


Miscellaneous Test   














  19





  04:14 06:07 12:04


 


WBC   


 


RBC   


 


Hgb   


 


Hct   


 


MCHC   


 


RDW   


 


Plt Count   


 


Lymph % (Auto)   


 


Mono % (Auto)   


 


Lymph #   


 


Mono #   


 


Seg Neutrophils %   


 


Seg Neuts % (Manual)   


 


Lymphocytes % (Manual)   


 


Seg Neutrophils #   


 


Seg Neutrophils # Man   


 


APTT   


 


Fibrinogen   


 


D-Dimer   


 


POC ABG pH   


 


POC ABG pCO2   


 


POC ABG pO2   


 


Sodium   


 


Potassium  3.5 L  


 


Chloride  109.4 H  


 


Carbon Dioxide   


 


BUN   


 


Creatinine   


 


Glucose  103 H  


 


POC Glucose   114 H  130 H


 


Calcium  7.7 L  


 


Phosphorus   


 


Magnesium   


 


Direct Bilirubin   


 


AST   


 


ALT   


 


Lactate Dehydrogenase   


 


Total Creatine Kinase   


 


CK-MB (CK-2)   


 


CK-MB (CK-2) Rel Index   


 


Troponin T   


 


C-Reactive Protein   


 


NT-Pro-B Natriuret Pep   


 


Total Protein   


 


Albumin   


 


Triglycerides   


 


Cholesterol   


 


HDL Cholesterol   


 


Lipase   


 


Urine WBC (Auto)   


 


Urine Creatinine   


 


Miscellaneous Test   











Chest x-ray: report reviewed, image reviewed

## 2019-07-13 NOTE — PROGRESS NOTE
Assessment and Plan





- Patient Problems


(1) Acute respiratory failure


Current Visit: Yes   Status: Resolved   


Qualifiers: 


   Respiratory failure complication: hypoxia   Qualified Code(s): J96.01 - Acute

respiratory failure with hypoxia   





(2) Acute renal failure


Current Visit: Yes   Status: Resolved   


Qualifiers: 


   Acute renal failure type: with acute tubular necrosis   Qualified Code(s): 

N17.0 - Acute kidney failure with tubular necrosis   





(3)  delivery delivered


Current Visit: Yes   Status: Acute   


Plan to address problem: 


POD#8 doing well


Will allow home if ok'd by nephrology-waiting for call back








(4) Pulmonary edema


Current Visit: Yes   Status: Resolved   





(5) Gestational hypertension


Current Visit: Yes   Status: Resolved   


Qualifiers: 


   Trimester: third trimester   Qualified Code(s): O13.3 - Gestational 

[pregnancy-induced] hypertension without significant proteinuria, third 

trimester   





(6) Hypokalemia


Current Visit: Yes   Status: Acute   





(7) Elevated LFTs


Current Visit: Yes   Status: Acute   





(8) Gestational diabetes


Current Visit: Yes   Status: Acute   





(9) Rh negative, delivered, current hospitalization


Current Visit: Yes   Status: Acute   





(10) 38 weeks gestation of pregnancy


Current Visit: Yes   Status: Resolved   





(11) Positive GBS test


Current Visit: Yes   Status: Resolved   





Subjective





- Subjective


Date of service: 19


Principal diagnosis: 1) POD #8 S/P 1LTCS  2)s/p acute resp/renal failure


Interval history: 


No complaints, no bleeding, desires d/c home today





Patient reports: appetite normal, voiding normally, pain well controlled, 

flatus, bowel movement, ambulating normally





Objective





- Vital Signs


Latest vital signs: 


                                   Vital Signs











  Temp Pulse Resp BP Pulse Ox


 


 19 03:47  98.0 F  80  20  130/85  97


 


 19 23:15  97.9 F  82  20  130/74  97


 


 19 21:50      100


 


 19 19:54  98.1 F  81  20  148/86  99


 


 19 17:05  97.8 F  94 H  18  151/88  97


 


 19 16:00   101 H   


 


 19 12:13  97.8 F  100 H  18  134/84  99








                                Intake and Output











 19





 22:59 06:59 14:59


 


Intake Total 620 620 


 


Balance 620 620 


 


Intake:   


 


  Oral 500 620 


 


  Intake, Free Water 120  


 


Other:   


 


  Intake, Other Source Saline Solution  


 


  Total, Intake Amount 240 620 


 


  Voiding Method Toilet Toilet 


 


  # Voids   


 


    Indwelling Catheter 1  


 


    Void  3 


 


  # Bowel Movements  0 


 


  Weight 77.111 kg  














- Exam


Breasts: Present: normal.  Absent: swelling, discharge, pain, engorged


Lungs: Present: Clear to auscultation, Normal air movement


Abdomen: Present: soft, distention, normal bowel sounds.  Absent: tenderness, 

guarding


Uterus: Absent: tenderness


Extremities: Present: normal.  Absent: tenderness, edema


Incision: Present: normal, dry, intact (steristrips removed, no s/s infection)





- Labs


Labs: 


                              Abnormal lab results











  19 Range/Units





  13:24 17:09 04:14 


 


Potassium    3.5 L  (3.6-5.0)  mmol/L


 


Chloride    109.4 H  ()  mmol/L


 


Glucose    103 H  ()  mg/dL


 


POC Glucose  141 H  111 H   ()  


 


Calcium    7.7 L  (8.4-10.2)  mg/dL














  19 Range/Units





  06:07 


 


Potassium   (3.6-5.0)  mmol/L


 


Chloride   ()  mmol/L


 


Glucose   ()  mg/dL


 


POC Glucose  114 H  ()  


 


Calcium   (8.4-10.2)  mg/dL

## 2019-07-13 NOTE — PROGRESS NOTE
Assessment and Plan


Acute resp failure s/p intubated , placed on vent 


- likely ARDS with Pulmonary edema- noncardiogenic, pulmonary following


- Patient was given Lasix, Echo reports a normal left ventricular size and 

function LVEF 45-50%.


- s/p extubation on 





Nausea/vomiting with abdominal distension


- NPO, s/p NG suction on clamped, serial abdominal xry - shows distended bowels 

w/o free air


-  CT abdomen showed extensive colonic dilatation, consulted GS - no surgical 

intervention needed


- tolerating diet now 





Labile blood pressure


Sinus tachycardia


s/p  on 19


GBS positive


History of herpes simplex 2


History of preeclampsia


NAZARIO probably ATN from hypotension


Leukocytosis





- Continue supportive care


Nephrology following, monitor BMP


Routine pulm toileting


Cardiology following


Pulmonology following


On Empiric Cefepime , ID following


DVT PPX on Lovenox





Medically stable for discharge. 





Brief History:


38-year-old  female who is s/p  on 19.  Patient was 

transferred to IMCU for management of pulmonary edema.  Chest x-ray showed mild 

cardiomegaly and pulmonary edema. She was given lasix. On night of , worse 

respiratory distress, rapid response team called and she was intubated put on 

vent. Also now has NAZARIO, managed by nephrology. Pulmonary edema thought to be non

cardiogenic, likely ARDS. Patient transferred to ICU, extubated on . Now 

developed N/V/abdominal distension since the day of extubation. CT abdomen 

showed extensive colonic dilatation with gas, no obstruction, surgery 

recommended medical Mx. d/c planning per primary





Hospitalist Physical


Gen: Not in acute distress, lying in bed, 


HEENT: Normocephalic, atraumatic


Neck: supple, no JVD


Heart: S1 and S2 reg, no murmurs, rubs or gallop


Lungs: no Bilateral crackles, no wheeze


Abd: soft, non tender, distended, + BS


Ext: No edema, no clubbing, no cyanosis, 


Neuro: arouseable, follows commands, moves all ext











Subjective


Date of service: 19


Principal diagnosis: 1) POD #4 S/P 1LTCS  2)gestatinal hypertension 3) acute 

resp failur


Interval history: 





Patient seen and examined


no N/V, having BM


no abdominal pain











Objective





- Constitutional


Vitals: 


                               Vital Signs - 12hr











  07/12/19 07/13/19





  23:15 03:47


 


Temperature 97.9 F 98.0 F


 


Pulse Rate 82 80


 


Respiratory 20 20





Rate  


 


Blood Pressure 130/74 130/85


 


O2 Sat by Pulse 97 97





Oximetry  














- Labs


CBC & Chem 7: 


                                 19 04:09





                                 19 04:14


Labs: 


                              Abnormal lab results











  19 Range/Units





  13:24 17:09 04:14 


 


Potassium    3.5 L  (3.6-5.0)  mmol/L


 


Chloride    109.4 H  ()  mmol/L


 


Glucose    103 H  ()  mg/dL


 


POC Glucose  141 H  111 H   ()  


 


Calcium    7.7 L  (8.4-10.2)  mg/dL














  19 Range/Units





  06:07 


 


Potassium   (3.6-5.0)  mmol/L


 


Chloride   ()  mmol/L


 


Glucose   ()  mg/dL


 


POC Glucose  114 H  ()  


 


Calcium   (8.4-10.2)  mg/dL

## 2020-07-08 NOTE — PROGRESS NOTE
Assessment and Plan





Impression:


* NAZARIO with ATN


* sepsis


* ARDS


* post partum


* hypoxemia





Plan:


* cr noted


* keep MAP >65


* avoid nephrotoxins


* rec follow up echo


* agressive duresis as tolerated


* may need rrt if no improvement








Subjective


Date of service: 07/08/19


Principal diagnosis: 1) POD #2 S/P 1LTCS 2)LOW UOP 3) gestatinal hypertension 4)

pulmonary edema


Interval history: 





resting in bed today





Objective





- Exam


Narrative Exam: 





HEENT: Normocephalic/atraumatic skull no pallor or icterus no uremic order oral 

mucosa moist


Neck: Supple without any thyromegaly noted or mass JVD


Chest: Bilateral diminished at bases, scattered wheezes crackles 


Heart: Regular rate and rhythm S1 and S2 heard no S3-S4 no pericardial rub


Abdomen: Soft nontender no organomegaly no masses no suprapubic fullness no CVA 

tenderness no renal bruit


Extremity: Dry skin, peripheral pulses palpable 


Some edema noted


Endocrine: Thyroid not enlarged


Psych: No evidence of vegetation aggression or behavioral issues noted


Hernan: Dry skin no petechial skin rashes


Back: Nontender thoracolumbar spine


Neurological: Alert awake follows commands, grossly nonfocal examination











.





- Vital Signs


Vital signs: 


                               Vital Signs - 12hr











  07/08/19 07/08/19 07/08/19





  00:00 00:16 00:30


 


Temperature 97.4 F L  


 


Pulse Rate 71 81 76


 


Pulse Rate [   





Bilateral   





Throughout]   


 


Pulse Rate [ 74  





From Monitor]   


 


Respiratory 25 H 25 H 25 H





Rate   


 


Respiratory   





Rate [Bilateral   





Throughout]   


 


Blood Pressure 105/60 111/67 111/67


 


O2 Sat by Pulse 100 100 100





Oximetry   














  07/08/19 07/08/19 07/08/19





  00:46 01:00 01:03


 


Temperature   


 


Pulse Rate 74 70 77


 


Pulse Rate [   





Bilateral   





Throughout]   


 


Pulse Rate [   





From Monitor]   


 


Respiratory 25 H 25 H 





Rate   


 


Respiratory   





Rate [Bilateral   





Throughout]   


 


Blood Pressure 105/60 108/60 108/60


 


O2 Sat by Pulse 100 100 100





Oximetry   














  07/08/19 07/08/19 07/08/19





  01:13 01:16 01:18


 


Temperature   


 


Pulse Rate  72 


 


Pulse Rate [ 69  74





Bilateral   





Throughout]   


 


Pulse Rate [   





From Monitor]   


 


Respiratory  25 H 





Rate   


 


Respiratory 24  25 H





Rate [Bilateral   





Throughout]   


 


Blood Pressure  108/60 


 


O2 Sat by Pulse  100 





Oximetry   














  07/08/19 07/08/19 07/08/19





  01:30 01:46 02:00


 


Temperature   


 


Pulse Rate 79 82 80


 


Pulse Rate [   





Bilateral   





Throughout]   


 


Pulse Rate [   





From Monitor]   


 


Respiratory 25 H 25 H 25 H





Rate   


 


Respiratory   





Rate [Bilateral   





Throughout]   


 


Blood Pressure 108/60 108/60 105/59


 


O2 Sat by Pulse 100 99 99





Oximetry   














  07/08/19 07/08/19 07/08/19





  02:16 02:30 02:46


 


Temperature   


 


Pulse Rate 82 103 H 85


 


Pulse Rate [   





Bilateral   





Throughout]   


 


Pulse Rate [   





From Monitor]   


 


Respiratory 25 H 22 23





Rate   


 


Respiratory   





Rate [Bilateral   





Throughout]   


 


Blood Pressure 108/60 108/60 105/59


 


O2 Sat by Pulse 100 99 98





Oximetry   














  07/08/19 07/08/19 07/08/19





  03:00 03:16 03:30


 


Temperature   


 


Pulse Rate 79 77 77


 


Pulse Rate [   





Bilateral   





Throughout]   


 


Pulse Rate [   





From Monitor]   


 


Respiratory 25 H 22 24





Rate   


 


Respiratory   





Rate [Bilateral   





Throughout]   


 


Blood Pressure 101/54 101/54 101/54


 


O2 Sat by Pulse 99 99 99





Oximetry   














  07/08/19 07/08/19 07/08/19





  03:46 03:52 04:00


 


Temperature  98.5 F 


 


Pulse Rate 80  74


 


Pulse Rate [   





Bilateral   





Throughout]   


 


Pulse Rate [   





From Monitor]   


 


Respiratory 25 H  22





Rate   


 


Respiratory   





Rate [Bilateral   





Throughout]   


 


Blood Pressure 101/54  109/62


 


O2 Sat by Pulse 99  98





Oximetry   














  07/08/19 07/08/19 07/08/19





  04:16 04:30 04:46


 


Temperature   


 


Pulse Rate 73 81 81


 


Pulse Rate [   





Bilateral   





Throughout]   


 


Pulse Rate [   





From Monitor]   


 


Respiratory 22 26 H 24





Rate   


 


Respiratory   





Rate [Bilateral   





Throughout]   


 


Blood Pressure 101/54 101/54 101/54


 


O2 Sat by Pulse 98 100 100





Oximetry   














  07/08/19 07/08/19 07/08/19





  04:59 05:00 05:16


 


Temperature   


 


Pulse Rate 89 93 H 107 H


 


Pulse Rate [   





Bilateral   





Throughout]   


 


Pulse Rate [   





From Monitor]   


 


Respiratory  21 19





Rate   


 


Respiratory   





Rate [Bilateral   





Throughout]   


 


Blood Pressure 120/77 120/77 120/77


 


O2 Sat by Pulse 99 99 98





Oximetry   














  07/08/19 07/08/19 07/08/19





  05:30 05:46 06:00


 


Temperature   


 


Pulse Rate 86 91 H 103 H


 


Pulse Rate [   





Bilateral   





Throughout]   


 


Pulse Rate [   





From Monitor]   


 


Respiratory 26 H 25 H 23





Rate   


 


Respiratory   





Rate [Bilateral   





Throughout]   


 


Blood Pressure 120/77 120/77 120/77


 


O2 Sat by Pulse 99 99 97





Oximetry   














  07/08/19 07/08/19 07/08/19





  06:16 06:30 06:46


 


Temperature   


 


Pulse Rate 121 H 110 H 105 H


 


Pulse Rate [   





Bilateral   





Throughout]   


 


Pulse Rate [   





From Monitor]   


 


Respiratory 28 H 29 H 23





Rate   


 


Respiratory   





Rate [Bilateral   





Throughout]   


 


Blood Pressure 120/77 120/77 120/77


 


O2 Sat by Pulse 95 97 99





Oximetry   














  07/08/19 07/08/19 07/08/19





  07:00 07:16 07:30


 


Temperature   


 


Pulse Rate 102 H 84 77


 


Pulse Rate [   





Bilateral   





Throughout]   


 


Pulse Rate [   





From Monitor]   


 


Respiratory 25 H 25 H 25 H





Rate   


 


Respiratory   





Rate [Bilateral   





Throughout]   


 


Blood Pressure 137/80 137/80 137/80


 


O2 Sat by Pulse 98 99 97





Oximetry   














  07/08/19 07/08/19 07/08/19





  07:46 08:00 08:08


 


Temperature  99.2 F 99.2 F


 


Pulse Rate 83 96 H 


 


Pulse Rate [   





Bilateral   





Throughout]   


 


Pulse Rate [  98 H 





From Monitor]   


 


Respiratory 25 H 25 H 





Rate   


 


Respiratory   





Rate [Bilateral   





Throughout]   


 


Blood Pressure 137/80 140/91 


 


O2 Sat by Pulse 99 99 





Oximetry   














  07/08/19 07/08/19 07/08/19





  08:16 08:30 08:46


 


Temperature   


 


Pulse Rate 99 H 94 H 85


 


Pulse Rate [   





Bilateral   





Throughout]   


 


Pulse Rate [   





From Monitor]   


 


Respiratory 25 H 25 H 25 H





Rate   


 


Respiratory   





Rate [Bilateral   





Throughout]   


 


Blood Pressure 140/91 140/91 140/91


 


O2 Sat by Pulse 98 99 100





Oximetry   














  07/08/19 07/08/19 07/08/19





  08:59 09:00 09:11


 


Temperature   


 


Pulse Rate 25 L 83 


 


Pulse Rate [   83





Bilateral   





Throughout]   


 


Pulse Rate [   





From Monitor]   


 


Respiratory  25 H 





Rate   


 


Respiratory   25 H





Rate [Bilateral   





Throughout]   


 


Blood Pressure 122/74 122/74 


 


O2 Sat by Pulse 100 100 





Oximetry   














  07/08/19 07/08/19 07/08/19





  09:16 09:21 09:30


 


Temperature   


 


Pulse Rate 121 H  86


 


Pulse Rate [  105 H 





Bilateral   





Throughout]   


 


Pulse Rate [   





From Monitor]   


 


Respiratory 25 H  25 H





Rate   


 


Respiratory  25 H 





Rate [Bilateral   





Throughout]   


 


Blood Pressure 122/74  122/74


 


O2 Sat by Pulse 100  100





Oximetry   














  07/08/19 07/08/19 07/08/19





  09:46 10:00 10:16


 


Temperature   


 


Pulse Rate 107 H 116 H 99 H


 


Pulse Rate [   





Bilateral   





Throughout]   


 


Pulse Rate [   





From Monitor]   


 


Respiratory 22 16 22





Rate   


 


Respiratory   





Rate [Bilateral   





Throughout]   


 


Blood Pressure 122/74 122/74 148/99


 


O2 Sat by Pulse 99 98 98





Oximetry   














  07/08/19 07/08/19 07/08/19





  10:30 10:46 11:00


 


Temperature   


 


Pulse Rate 96 H 103 H 95 H


 


Pulse Rate [   





Bilateral   





Throughout]   


 


Pulse Rate [   





From Monitor]   


 


Respiratory 24 24 24





Rate   


 


Respiratory   





Rate [Bilateral   





Throughout]   


 


Blood Pressure 148/99 148/99 139/87


 


O2 Sat by Pulse 99 98 100





Oximetry   














  07/08/19





  11:16


 


Temperature 


 


Pulse Rate 92 H


 


Pulse Rate [ 





Bilateral 





Throughout] 


 


Pulse Rate [ 





From Monitor] 


 


Respiratory 26 H





Rate 


 


Respiratory 





Rate [Bilateral 





Throughout] 


 


Blood Pressure 139/87


 


O2 Sat by Pulse 99





Oximetry 














- Lab





                                 07/08/19 03:44





                                 07/08/19 03:44


                             Most recent lab results











Calcium  7.7 mg/dL (8.4-10.2)  L  07/08/19  03:44    


 


Phosphorus  2.90 mg/dL (2.5-4.5)   07/08/19  03:44    


 


Magnesium  1.80 mg/dL (1.7-2.3)   07/08/19  03:44    


 


  34.9 mg/dL (0.1-20.0)  H  07/07/19  10:19    


 


  72 mmol/L  07/07/19  10:19    














Medications & Allergies





- Medications


Allergies/Adverse Reactions: 


                                    Allergies





No Known Allergies Allergy (Verified 07/04/19 19:31)


   








Home Medications: 


                                Home Medications











 Medication  Instructions  Recorded  Confirmed  Last Taken  Type


 


No Known Home Medications [No  07/05/19 07/05/19 Unknown History





Reported Home Medications]     











Active Medications: 














Generic Name Dose Route Start Last Admin





  Trade Name Freq  PRN Reason Stop Dose Admin


 


Al Hydrox/Mg Hydrox/Simethicone  30 ml  07/05/19 23:16 





  Alum-Mag Hydrox-Simeth 074-898-77vs/5ml  PO  





  Q4H PRN  





  Indigestion  


 


Albuterol  2.5 mg  07/07/19 08:00  07/08/19 09:11





  Proventil  IH   2.5 mg





  Q8HRT AYAKA   Administration


 


Bisacodyl  10 mg  07/05/19 23:16 





  Dulcolax  CA  





  QDAY PRN  





  constipation unrelieved by MOM  


 


Famotidine  20 mg  07/08/19 10:00  07/08/19 09:52





  Pepcid  IV   20 mg





  DAILY AYAKA   Administration


 


Fentanyl  50 mcg  07/06/19 23:25 





  Sublimaze  IV  





  Q10MIN PRN  





  ANALGESIA  


 


Hydrophilic Ointment  1 applic  07/06/19 22:12 





  Vaseline Lip Therapy  TP  





  Q2HR PRN  





  Dry Lips  


 


Oxytocin/Sodium Chloride  20 units in 1,000 mls @ 250 mls/hr  07/05/19 23:16 





  Pitocin/Ns 20 Unit/1000ml Drip  IV  





  AS DIRECT AYAKA  


 


Fentanyl Citrate  2,000 mcg in 100 mls @ 3.856 mls/hr  07/06/19 23:45  07/08/19 

05:00





  Fentanyl Drip Premix  IV   Infused





  TITR AYAKA   Titration





  Protocol  





  1 MCG/KG/HR  


 


Midazolam HCl 100 mg/ Sodium  100 mls @ 2 mls/hr  07/06/19 23:45  07/08/19 09:50





  Chloride  IV   0 mg/hr





  TITR AYAKA   0 mls/hr





    Titration





  Protocol  





  2 MG/HR  


 


Cefepime HCl  2 gm in 100 mls @ 200 mls/hr  07/08/19 15:00 





  Maxipime/Ns 2 Gm/100 Ml  IV  





  Q24H AYAKA  





  Protocol  


 


Magnesium Hydroxide  30 ml  07/05/19 23:16 





  Milk Of Magnesia  PO  





  Q4H PRN  





  Constipation  


 


Metoclopramide HCl  10 mg  07/05/19 23:16 





  Reglan  IV  





  Q6H PRN  





  Nausea And Vomiting  


 


Midazolam HCl  2 mg  07/06/19 23:25 





  Versed  IV  





  Q10MIN PRN  





  Sedation  


 


Morphine Sulfate  2 mg  07/06/19 23:19 





  Morphine  IV  





  Q4H PRN  





  Pain, Moderate (4-6)  


 


Multi-Ingred Cream/Lotion/Oil/Oint  1 applic  07/06/19 22:12 





  Artificial Tears Ophth Oint  OU  





  Q4HR PRN  





  Dry Eye(s)  


 


Multi-Ingredient Ointment  1 applic  07/05/19 23:16 





  Lansinoh  TP  





  PRN PRN  





  dryness/cracking  


 


Naloxone HCl  0.1 mg  07/05/19 23:16 





  Narcan 0.4 Mg/1 Ml  IV  





  Q2MIN PRN  





  Res Rate </= 8 or 02 SAT < 92%  


 


Sodium Chloride  10 ml  07/05/19 23:16  07/08/19 09:52





  Sodium Chloride Flush Syringe 10 Ml  IV   10 ml





  BID AYAKA   Administration


 


Sodium Chloride  10 ml  07/05/19 23:16 





  Sodium Chloride Flush Syringe 10 Ml  IV  





  PRN PRN  





  LINE FLUSH  


 


Witch Hazel/Glycerin  1 each  07/05/19 23:16 





  Tucks Pad  TP  





  PRN PRN  





  Hemorrhoids/cleansing/soothing - - -